# Patient Record
Sex: MALE | Race: OTHER | HISPANIC OR LATINO | Employment: OTHER | URBAN - METROPOLITAN AREA
[De-identification: names, ages, dates, MRNs, and addresses within clinical notes are randomized per-mention and may not be internally consistent; named-entity substitution may affect disease eponyms.]

---

## 2018-06-09 LAB
ABSOL LYMPHOCYTES (HISTORICAL): 3 K/UL (ref 0.5–4)
ALBUMIN SERPL BCP-MCNC: 3.9 G/DL (ref 3–5.2)
ALP SERPL-CCNC: 112 U/L (ref 43–122)
ALT SERPL W P-5'-P-CCNC: 19 U/L (ref 9–52)
ANION GAP SERPL CALCULATED.3IONS-SCNC: 10 MMOL/L (ref 5–14)
AST SERPL W P-5'-P-CCNC: 18 U/L (ref 17–59)
BANDS (HISTORICAL): 1 % (ref 3–11)
BILIRUB SERPL-MCNC: 0.5 MG/DL
BUN SERPL-MCNC: 16 MG/DL (ref 5–25)
CALCIUM SERPL-MCNC: 9.6 MG/DL (ref 8.4–10.2)
CHLORIDE SERPL-SCNC: 101 MEQ/L (ref 97–108)
CHOLEST SERPL-MCNC: 227 MG/DL
CHOLEST/HDLC SERPL: 6.3 {RATIO}
CO2 SERPL-SCNC: 25 MMOL/L (ref 22–30)
COMMENT (HISTORICAL): ABNORMAL
CREATINE, SERUM (HISTORICAL): 0.78 MG/DL (ref 0.7–1.5)
CREATININE, RANDOM URINE (HISTORICAL): 166.9 MG/DL (ref 50–200)
DEPRECATED RDW RBC AUTO: 14.8 %
EGFR (HISTORICAL): >60 ML/MIN/1.73 M2
EOSINOPHIL # BLD AUTO: 0.1 K/UL (ref 0–0.4)
EOSINOPHIL NFR BLD AUTO: 2 % (ref 0–6)
GLUCOSE SERPL-MCNC: 369 MG/DL (ref 70–99)
HCT VFR BLD AUTO: 38.4 % (ref 41–53)
HDLC SERPL-MCNC: 36 MG/DL
HGB BLD-MCNC: 12.2 G/DL (ref 13.5–17.5)
LDL/HDL RATIO (HISTORICAL): 4.5
LDLC SERPL CALC-MCNC: 162 MG/DL
LYMPHOCYTES NFR BLD AUTO: 56 % (ref 25–45)
MCH RBC QN AUTO: 27.5 PG (ref 26–34)
MCHC RBC AUTO-ENTMCNC: 31.8 % (ref 31–36)
MCV RBC AUTO: 86 FL (ref 80–100)
MICROALBUM.,U,RANDOM (HISTORICAL): 4.4 MG/DL
MICROALBUMIN/CREATININE RATIO (HISTORICAL): 26.4
MONOCYTES # BLD AUTO: 0.2 K/UL (ref 0.2–0.9)
MONOCYTES NFR BLD AUTO: 4 % (ref 1–10)
NEUTROPHILS ABS COUNT (HISTORICAL): 2 K/UL (ref 1.8–7.8)
NEUTS SEG NFR BLD AUTO: 37 % (ref 45–65)
PLATELET # BLD AUTO: 266 K/MCL (ref 150–450)
POTASSIUM SERPL-SCNC: 4.5 MEQ/L (ref 3.6–5)
RBC # BLD AUTO: 4.44 M/MCL (ref 4.5–5.9)
RBC MORPHOLOGY (HISTORICAL): ABNORMAL
SODIUM SERPL-SCNC: 137 MEQ/L (ref 137–147)
TOTAL PROTEIN (HISTORICAL): 8.1 G/DL (ref 5.9–8.4)
TRIGL SERPL-MCNC: 145 MG/DL
TSH SERPL DL<=0.05 MIU/L-ACNC: 1.76 UIU/ML (ref 0.47–4.68)
VLDLC SERPL CALC-MCNC: 29 MG/DL (ref 0–40)
WBC # BLD AUTO: 5.3 K/MCL (ref 4.5–11)

## 2018-06-10 LAB
EST. AVERAGE GLUCOSE BLD GHB EST-MCNC: 321 MG/DL
HBA1C MFR BLD HPLC: 12.8 %

## 2018-07-18 ENCOUNTER — TELEPHONE (OUTPATIENT)
Dept: FAMILY MEDICINE CLINIC | Facility: CLINIC | Age: 68
End: 2018-07-18

## 2018-07-18 DIAGNOSIS — E78.49 OTHER HYPERLIPIDEMIA: ICD-10-CM

## 2018-07-18 DIAGNOSIS — E11.69 TYPE 2 DIABETES MELLITUS WITH OTHER SPECIFIED COMPLICATION, WITHOUT LONG-TERM CURRENT USE OF INSULIN (HCC): Primary | ICD-10-CM

## 2018-07-18 RX ORDER — ATORVASTATIN CALCIUM 40 MG/1
40 TABLET, FILM COATED ORAL DAILY
Qty: 30 TABLET | Refills: 0 | Status: SHIPPED | OUTPATIENT
Start: 2018-07-18 | End: 2018-08-15 | Stop reason: SDUPTHER

## 2018-08-15 DIAGNOSIS — E78.49 OTHER HYPERLIPIDEMIA: ICD-10-CM

## 2018-08-15 DIAGNOSIS — K21.9 GASTROESOPHAGEAL REFLUX DISEASE, ESOPHAGITIS PRESENCE NOT SPECIFIED: Primary | ICD-10-CM

## 2018-08-15 DIAGNOSIS — E11.69 TYPE 2 DIABETES MELLITUS WITH OTHER SPECIFIED COMPLICATION, WITHOUT LONG-TERM CURRENT USE OF INSULIN (HCC): ICD-10-CM

## 2018-08-15 NOTE — TELEPHONE ENCOUNTER
Once refilled, please route to clerical pool to schedule patient with new red team PCP   A1C in June was 12 8 and last visit in June as well, pt should f/u Sept

## 2018-08-16 RX ORDER — ATORVASTATIN CALCIUM 40 MG/1
40 TABLET, FILM COATED ORAL DAILY
Qty: 30 TABLET | Refills: 0 | Status: SHIPPED | OUTPATIENT
Start: 2018-08-16 | End: 2018-09-12 | Stop reason: SDUPTHER

## 2018-08-16 RX ORDER — PANTOPRAZOLE SODIUM 20 MG/1
20 TABLET, DELAYED RELEASE ORAL DAILY
Qty: 30 TABLET | Refills: 0 | Status: SHIPPED | OUTPATIENT
Start: 2018-08-16 | End: 2018-09-12 | Stop reason: SDUPTHER

## 2018-08-21 NOTE — TELEPHONE ENCOUNTER
I have tried several time to contact pt and have left voice messages for pt to contact office and as of today no answer  I will be mailing a letter to pt address for pt to contact office   Last voice message was today at 8:30am

## 2018-08-29 DIAGNOSIS — E11.65 TYPE 2 DIABETES MELLITUS WITH HYPERGLYCEMIA, WITH LONG-TERM CURRENT USE OF INSULIN (HCC): Primary | ICD-10-CM

## 2018-08-29 DIAGNOSIS — Z79.4 TYPE 2 DIABETES MELLITUS WITH HYPERGLYCEMIA, WITH LONG-TERM CURRENT USE OF INSULIN (HCC): Primary | ICD-10-CM

## 2018-08-29 NOTE — TELEPHONE ENCOUNTER
Pt has yet to contact to schedule per Dr Britt Green letter  Will provide another refill, please route to clerical pool to try to contact pt to schedule with a new red team PCP in 1-2 months  Patient was seen in June

## 2018-08-31 NOTE — TELEPHONE ENCOUNTER
Please attempt to contact one more time to schedule  Letter has already been sent with no response as of yet   Thanks

## 2018-09-12 DIAGNOSIS — K21.9 GASTROESOPHAGEAL REFLUX DISEASE, ESOPHAGITIS PRESENCE NOT SPECIFIED: ICD-10-CM

## 2018-09-12 DIAGNOSIS — E78.49 OTHER HYPERLIPIDEMIA: ICD-10-CM

## 2018-09-12 DIAGNOSIS — E11.69 TYPE 2 DIABETES MELLITUS WITH OTHER SPECIFIED COMPLICATION, WITHOUT LONG-TERM CURRENT USE OF INSULIN (HCC): ICD-10-CM

## 2018-09-12 NOTE — TELEPHONE ENCOUNTER
Pharmacy is req a refill on this medications  Last visit 6/11/18 With Dr Ralf Trinidad  I send a msg to the front staff to schedule an appt   x

## 2018-09-13 RX ORDER — PANTOPRAZOLE SODIUM 20 MG/1
20 TABLET, DELAYED RELEASE ORAL DAILY
Qty: 30 TABLET | Refills: 0 | Status: SHIPPED | OUTPATIENT
Start: 2018-09-13 | End: 2019-02-04 | Stop reason: SDUPTHER

## 2018-09-13 RX ORDER — ATORVASTATIN CALCIUM 40 MG/1
40 TABLET, FILM COATED ORAL DAILY
Qty: 30 TABLET | Refills: 0 | Status: SHIPPED | OUTPATIENT
Start: 2018-09-13 | End: 2019-02-04 | Stop reason: SDUPTHER

## 2019-01-07 DIAGNOSIS — E11.65 TYPE 2 DIABETES MELLITUS WITH HYPERGLYCEMIA, UNSPECIFIED WHETHER LONG TERM INSULIN USE (HCC): Primary | ICD-10-CM

## 2019-01-07 NOTE — TELEPHONE ENCOUNTER
Was able to get in touch with pt with phone # listed in pt's chart in [place] [id number] which I have listed in [address]  Updated pt's demographics and scheduled f/u appt with Dr Stefan Carpenter on 2/4/19  Pt urged to keep appt in order to receive best cares and continue to receive refills  Pt advised to contact office if cannot keep appt to cancel and/or reschedule  Pt understood and agreed

## 2019-02-04 ENCOUNTER — OFFICE VISIT (OUTPATIENT)
Dept: FAMILY MEDICINE CLINIC | Facility: CLINIC | Age: 69
End: 2019-02-04

## 2019-02-04 VITALS
DIASTOLIC BLOOD PRESSURE: 78 MMHG | SYSTOLIC BLOOD PRESSURE: 160 MMHG | OXYGEN SATURATION: 97 % | RESPIRATION RATE: 16 BRPM | TEMPERATURE: 98.4 F | WEIGHT: 193 LBS | HEART RATE: 73 BPM

## 2019-02-04 DIAGNOSIS — K21.9 GASTROESOPHAGEAL REFLUX DISEASE, ESOPHAGITIS PRESENCE NOT SPECIFIED: ICD-10-CM

## 2019-02-04 DIAGNOSIS — Z12.11 SCREENING FOR COLON CANCER: ICD-10-CM

## 2019-02-04 DIAGNOSIS — R10.31 RIGHT LOWER QUADRANT ABDOMINAL PAIN: ICD-10-CM

## 2019-02-04 DIAGNOSIS — M54.50 CHRONIC MIDLINE LOW BACK PAIN WITHOUT SCIATICA: ICD-10-CM

## 2019-02-04 DIAGNOSIS — I10 ESSENTIAL HYPERTENSION: ICD-10-CM

## 2019-02-04 DIAGNOSIS — Z23 NEED FOR VACCINATION: ICD-10-CM

## 2019-02-04 DIAGNOSIS — G89.29 CHRONIC MIDLINE LOW BACK PAIN WITHOUT SCIATICA: ICD-10-CM

## 2019-02-04 DIAGNOSIS — E78.49 OTHER HYPERLIPIDEMIA: ICD-10-CM

## 2019-02-04 DIAGNOSIS — Z79.4 TYPE 2 DIABETES MELLITUS WITH HYPERGLYCEMIA, WITH LONG-TERM CURRENT USE OF INSULIN (HCC): Primary | ICD-10-CM

## 2019-02-04 DIAGNOSIS — E11.65 TYPE 2 DIABETES MELLITUS WITH HYPERGLYCEMIA, WITH LONG-TERM CURRENT USE OF INSULIN (HCC): Primary | ICD-10-CM

## 2019-02-04 PROCEDURE — 90662 IIV NO PRSV INCREASED AG IM: CPT | Performed by: FAMILY MEDICINE

## 2019-02-04 PROCEDURE — G0008 ADMIN INFLUENZA VIRUS VAC: HCPCS | Performed by: FAMILY MEDICINE

## 2019-02-04 PROCEDURE — 3725F SCREEN DEPRESSION PERFORMED: CPT | Performed by: FAMILY MEDICINE

## 2019-02-04 PROCEDURE — 1101F PT FALLS ASSESS-DOCD LE1/YR: CPT | Performed by: FAMILY MEDICINE

## 2019-02-04 PROCEDURE — 99214 OFFICE O/P EST MOD 30 MIN: CPT | Performed by: FAMILY MEDICINE

## 2019-02-04 RX ORDER — PANTOPRAZOLE SODIUM 20 MG/1
20 TABLET, DELAYED RELEASE ORAL DAILY
Qty: 30 TABLET | Refills: 0 | Status: SHIPPED | OUTPATIENT
Start: 2019-02-04 | End: 2019-03-15 | Stop reason: SDUPTHER

## 2019-02-04 RX ORDER — HYDROCHLOROTHIAZIDE 25 MG/1
25 TABLET ORAL DAILY
Qty: 90 TABLET | Refills: 0 | Status: SHIPPED | OUTPATIENT
Start: 2019-02-04 | End: 2019-05-08 | Stop reason: SDUPTHER

## 2019-02-04 RX ORDER — AMLODIPINE BESYLATE 10 MG/1
10 TABLET ORAL DAILY
Qty: 90 TABLET | Refills: 0 | Status: SHIPPED | OUTPATIENT
Start: 2019-02-04 | End: 2019-05-08 | Stop reason: SDUPTHER

## 2019-02-04 RX ORDER — ATORVASTATIN CALCIUM 40 MG/1
40 TABLET, FILM COATED ORAL DAILY
Qty: 30 TABLET | Refills: 0 | Status: SHIPPED | OUTPATIENT
Start: 2019-02-04 | End: 2019-03-15 | Stop reason: SDUPTHER

## 2019-02-04 NOTE — PROGRESS NOTES
Assessment/Plan:    Gastroesophageal reflux disease  Counseled patient on dietary modifications and specific diet recommendations that will help to best control GERD symptoms  These practices would include limiting or eliminating caffeinated foods and beverages including chocolate, coffee and soda  Also limiting the intake of spicy foods or anything else that exacerbate symptoms  Will continue patient on PPI  Advised nothing by mouth at least 2-3 hours prior to lying down for bed  Advise placing the head of the bed on riser is to assist in gravity keeping stomach acid down      Type 2 diabetes mellitus with hyperglycemia, with long-term current use of insulin ([facility])  Lab Results   Component Value Date    HGBA1C 12 8 (H) 06/09/2018       No results for input(s): POCGLU in the last 72 hours      Blood Sugar Average: Last 72 hrs:     Patient is uncontrolled  Counseled extensively on diet and exercise  Advised and educated on home glucose testing and logs  Continue current p o  regimen of metformin 1000mg BID   Continue current insulin regimen of lantus 65U HS  Follows with Ophthalmology and Podiatry      Essential hypertension  Patient shows reasonable control on current regimen  Counseled on diet and exercise, particularly consumption of low sodium and processed foods  Educated on home blood pressure logs  Continue current medication regimen with HCTZ and amlodipine       Other hyperlipidemia  Patient counseled on diet and exercise  Advised to continue statin medication  Lipid profile ordered      Right lower quadrant abdominal pain  Patient reports that he has had this abdominal pain for years  Previous CT scans abdomen and pelvis WNL per patient  Pain is mild, nonspecific, and doesn't change in physical exam    Chronic midline low back pain without sciatica  Pain for many years  Patient reports having been through PT in the past and had more pain afterwards  Reports being seen by specialist in the past, but doesn't remember how long ago or who it was  Patient has empty Tramadol bottle with him today and reports last use was ~1 year ago because he hasn't had any, I refused to refill this medication  Will refer to pain/spine for further treatment/options       Diagnoses and all orders for this visit:    Type 2 diabetes mellitus with hyperglycemia, with long-term current use of insulin (HCC)  -     metFORMIN (GLUCOPHAGE) 1000 MG tablet; Take 1 tablet (1,000 mg total) by mouth 2 (two) times a day with meals  -     glucose blood (FREESTYLE LITE) test strip; 1 each by Other route 3 (three) times a day  -     insulin glargine (LANTUS SOLOSTAR) 100 units/mL injection pen; Inject 65 units in morning and 55 units in evening  -     Comprehensive metabolic panel; Future  -     Microalbumin / creatinine urine ratio  -     HEMOGLOBIN A1C W/ EAG ESTIMATION; Future  -     CBC and Platelet; Future    Need for vaccination  -     PREFERRED: influenza vaccine, [id number], high-dose, PF 0 5 mL, for patients 65 yr+ (FLUZONE HIGH-DOSE)    Other hyperlipidemia  -     atorvastatin (LIPITOR) 40 mg tablet; Take 1 tablet (40 mg total) by mouth daily  -     Lipid panel; Future    Gastroesophageal reflux disease, esophagitis presence not specified  -     pantoprazole (PROTONIX) 20 mg tablet; Take 1 tablet (20 mg total) by mouth daily    Essential hypertension  -     amLODIPine (NORVASC) 10 mg tablet; Take 1 tablet (10 mg total) by mouth daily  -     hydrochlorothiazide (HYDRODIURIL) 25 mg tablet; Take 1 tablet (25 mg total) by mouth daily    Right lower quadrant abdominal pain    Chronic midline low back pain without sciatica  -     Ambulatory referral to Pain Management; Future    Screening for colon cancer  -     Ambulatory referral to Gastroenterology; Future          Subjective:      Patient ID: Zuhair Sullivan is a 77 y o  male  This is a 78 yo  M who comes into the clinic today for follow up of chronic conditions   [employer] was originally used for translation, but patient speaks adequate english for the visit  He was a previous patient of Dr David Rocha  He reports being in [place] for ~9 months since his last visit and did not see a physician at all during that time  Despite this, he reports adequate diabetic control, at least by fasting morning glucose  He has no complaints today other than his chronic pain in his lower pain and mild RLQ abdominal, both of which have been evaluated on multiple occasions in the past  He specifically denies chest pain or shortness of breath  The following portions of the patient's history were reviewed and updated as appropriate: allergies, current medications, past family history, past medical history, past social history, past surgical history and problem list     Review of Systems   Constitutional: Negative for activity change, chills, fatigue and fever  HENT: Negative for congestion, sinus pain, sinus pressure and sore throat  Respiratory: Negative for cough, chest tightness, shortness of breath and wheezing  Cardiovascular: Negative for chest pain and palpitations  Gastrointestinal: Positive for abdominal pain  Negative for constipation, diarrhea, nausea and vomiting  Genitourinary: Negative for difficulty urinating, dysuria, frequency and hematuria  Musculoskeletal: Positive for back pain  Negative for arthralgias and neck pain  Skin: Negative for rash  Allergic/Immunologic: Negative for environmental allergies  Neurological: Negative for light-headedness and headaches  Psychiatric/Behavioral: Negative for confusion and hallucinations  Objective:      /78 (BP Location: Left arm, Patient Position: Sitting, Cuff Size: Adult)   Pulse 73   Temp 98 4 °F (36 9 °C) (Temporal)   Resp 16   Wt 87 5 kg (193 lb)   SpO2 97%          Physical Exam   Constitutional: He is oriented to person, place, and time  He appears well-developed and well-nourished  No distress     HENT:   Head: Normocephalic and atraumatic  Cardiovascular: Normal rate, regular rhythm and normal heart sounds  Pulses are no weak pulses  No murmur heard  Pulses:       Dorsalis pedis pulses are 1+ on the right side, and 2+ on the left side  Pulmonary/Chest: Effort normal and breath sounds normal  He has no wheezes  Abdominal: Soft  Bowel sounds are normal  There is tenderness (RLQ)  There is no guarding  Musculoskeletal: Normal range of motion  He exhibits no edema  Feet:   Right Foot:   Skin Integrity: Negative for ulcer, skin breakdown, erythema, warmth, callus or dry skin  Left Foot:   Skin Integrity: Negative for ulcer, skin breakdown, erythema, warmth, callus or dry skin  Neurological: He is alert and oriented to person, place, and time  Skin: Skin is warm and dry  No rash noted  Psychiatric: He has a normal mood and affect  His behavior is normal    Nursing note and vitals reviewed  Patient's shoes and socks removed  Right Foot/Ankle   Right Foot Inspection  Skin Exam: skin normal and skin intact no dry skin, no warmth, no callus, no erythema, no maceration, no abnormal color, no pre-ulcer, no ulcer and no callus                          Toe Exam: ROM and strength within normal limits  Sensory   Vibration: intact    Monofilament testing: intact  Vascular  Capillary refills: < 3 seconds  The right DP pulse is 1+  Right Toe  - Comprehensive Exam  Ecchymosis: none  Swelling: none   Tenderness: none         Left Foot/Ankle  Left Foot Inspection  Skin Exam: skin normal and skin intactno dry skin, no warmth, no erythema, no maceration, normal color, no pre-ulcer, no ulcer and no callus                         Toe Exam: ROM and strength within normal limits                   Sensory   Vibration: intact    Monofilament: intact  Vascular  Capillary refills: < 3 seconds  The left DP pulse is 2+     Left Toe  - Comprehensive Exam  Ecchymosis: none  Swelling: none   Tenderness: none       Assign Risk Category:  No deformity present; No loss of protective sensation;  No weak pulses       Risk: 0

## 2019-02-04 NOTE — ASSESSMENT & PLAN NOTE
Lab Results   Component Value Date    HGBA1C 12 8 (H) 06/09/2018       No results for input(s): POCGLU in the last 72 hours      Blood Sugar Average: Last 72 hrs:     Patient is uncontrolled  Counseled extensively on diet and exercise  Advised and educated on home glucose testing and logs  Continue current p o  regimen of metformin 1000mg BID   Continue current insulin regimen of lantus 65U HS  Follows with Ophthalmology and Podiatry

## 2019-02-04 NOTE — ASSESSMENT & PLAN NOTE
Pain for many years  Patient reports having been through PT in the past and had more pain afterwards  Reports being seen by specialist in the past, but doesn't remember how long ago or who it was  Patient has empty Tramadol bottle with him today and reports last use was ~1 year ago because he hasn't had any, I refused to refill this medication  Will refer to pain/spine for further treatment/options

## 2019-02-04 NOTE — ASSESSMENT & PLAN NOTE
Patient counseled on diet and exercise  Advised to continue statin medication  Lipid profile ordered

## 2019-02-04 NOTE — ASSESSMENT & PLAN NOTE
Patient reports that he has had this abdominal pain for years  Previous CT scans abdomen and pelvis WNL per patient  Pain is mild, nonspecific, and doesn't change in physical exam

## 2019-02-04 NOTE — ASSESSMENT & PLAN NOTE
Patient shows reasonable control on current regimen  Counseled on diet and exercise, particularly consumption of low sodium and processed foods  Educated on home blood pressure logs  Continue current medication regimen with HCTZ and amlodipine

## 2019-02-12 ENCOUNTER — TELEPHONE (OUTPATIENT)
Dept: FAMILY MEDICINE CLINIC | Facility: CLINIC | Age: 69
End: 2019-02-12

## 2019-02-12 NOTE — TELEPHONE ENCOUNTER
Pharmacy is req refill on this medication Januvia 100 mg  I dont see this medication in the chart   thx

## 2019-02-21 NOTE — TELEPHONE ENCOUNTER
Patient is already insulin dependent and on metformin  I don't see a big role for [place] [facility] [facility] here  Perhaps we can start patient on victoza at some point in the future  He also hasn't gotten any of the labs I ordered

## 2019-02-27 DIAGNOSIS — E11.65 TYPE 2 DIABETES MELLITUS WITH HYPERGLYCEMIA, WITH LONG-TERM CURRENT USE OF INSULIN (HCC): ICD-10-CM

## 2019-02-27 DIAGNOSIS — Z79.4 TYPE 2 DIABETES MELLITUS WITH HYPERGLYCEMIA, WITH LONG-TERM CURRENT USE OF INSULIN (HCC): ICD-10-CM

## 2019-02-27 NOTE — TELEPHONE ENCOUNTER
Pt called to [facility] med  Also requesting alcohol swabs

## 2019-03-01 ENCOUNTER — TELEPHONE (OUTPATIENT)
Dept: FAMILY MEDICINE CLINIC | Facility: CLINIC | Age: 69
End: 2019-03-01

## 2019-03-15 DIAGNOSIS — E11.65 TYPE 2 DIABETES MELLITUS WITH HYPERGLYCEMIA, WITH LONG-TERM CURRENT USE OF INSULIN (HCC): ICD-10-CM

## 2019-03-15 DIAGNOSIS — E78.49 OTHER HYPERLIPIDEMIA: ICD-10-CM

## 2019-03-15 DIAGNOSIS — K21.9 GASTROESOPHAGEAL REFLUX DISEASE, ESOPHAGITIS PRESENCE NOT SPECIFIED: ICD-10-CM

## 2019-03-15 DIAGNOSIS — Z79.4 TYPE 2 DIABETES MELLITUS WITH HYPERGLYCEMIA, WITH LONG-TERM CURRENT USE OF INSULIN (HCC): ICD-10-CM

## 2019-03-18 RX ORDER — ATORVASTATIN CALCIUM 40 MG/1
40 TABLET, FILM COATED ORAL DAILY
Qty: 30 TABLET | Refills: 1 | Status: SHIPPED | OUTPATIENT
Start: 2019-03-18 | End: 2019-05-08 | Stop reason: SDUPTHER

## 2019-03-18 RX ORDER — PANTOPRAZOLE SODIUM 20 MG/1
20 TABLET, DELAYED RELEASE ORAL DAILY
Qty: 30 TABLET | Refills: 1 | Status: SHIPPED | OUTPATIENT
Start: 2019-03-18 | End: 2019-05-08 | Stop reason: SDUPTHER

## 2019-04-25 ENCOUNTER — TELEPHONE (OUTPATIENT)
Dept: FAMILY MEDICINE CLINIC | Facility: CLINIC | Age: 69
End: 2019-04-25

## 2019-05-08 DIAGNOSIS — E78.49 OTHER HYPERLIPIDEMIA: ICD-10-CM

## 2019-05-08 DIAGNOSIS — Z79.4 TYPE 2 DIABETES MELLITUS WITH HYPERGLYCEMIA, WITH LONG-TERM CURRENT USE OF INSULIN (HCC): ICD-10-CM

## 2019-05-08 DIAGNOSIS — K21.9 GASTROESOPHAGEAL REFLUX DISEASE, ESOPHAGITIS PRESENCE NOT SPECIFIED: ICD-10-CM

## 2019-05-08 DIAGNOSIS — I10 ESSENTIAL HYPERTENSION: ICD-10-CM

## 2019-05-08 DIAGNOSIS — E11.65 TYPE 2 DIABETES MELLITUS WITH HYPERGLYCEMIA, WITH LONG-TERM CURRENT USE OF INSULIN (HCC): ICD-10-CM

## 2019-05-09 DIAGNOSIS — Z79.4 TYPE 2 DIABETES MELLITUS WITH HYPERGLYCEMIA, WITH LONG-TERM CURRENT USE OF INSULIN (HCC): ICD-10-CM

## 2019-05-09 DIAGNOSIS — R06.02 SHORTNESS OF BREATH: Primary | ICD-10-CM

## 2019-05-09 DIAGNOSIS — E11.65 TYPE 2 DIABETES MELLITUS WITH HYPERGLYCEMIA, WITH LONG-TERM CURRENT USE OF INSULIN (HCC): ICD-10-CM

## 2019-05-09 RX ORDER — PEN NEEDLE, DIABETIC 31 GX5/16"
NEEDLE, DISPOSABLE MISCELLANEOUS DAILY
Qty: 100 EACH | Refills: 3 | Status: SHIPPED | OUTPATIENT
Start: 2019-05-09 | End: 2019-12-31 | Stop reason: SDUPTHER

## 2019-05-13 RX ORDER — AMLODIPINE BESYLATE 10 MG/1
10 TABLET ORAL DAILY
Qty: 30 TABLET | Refills: 0 | Status: SHIPPED | OUTPATIENT
Start: 2019-05-13 | End: 2019-07-01 | Stop reason: SDUPTHER

## 2019-05-13 RX ORDER — PANTOPRAZOLE SODIUM 20 MG/1
20 TABLET, DELAYED RELEASE ORAL DAILY
Qty: 30 TABLET | Refills: 0 | Status: SHIPPED | OUTPATIENT
Start: 2019-05-13 | End: 2019-07-01 | Stop reason: SDUPTHER

## 2019-05-13 RX ORDER — HYDROCHLOROTHIAZIDE 25 MG/1
25 TABLET ORAL DAILY
Qty: 30 TABLET | Refills: 0 | Status: SHIPPED | OUTPATIENT
Start: 2019-05-13 | End: 2019-07-01 | Stop reason: SDUPTHER

## 2019-05-13 RX ORDER — ATORVASTATIN CALCIUM 40 MG/1
40 TABLET, FILM COATED ORAL DAILY
Qty: 30 TABLET | Refills: 0 | Status: SHIPPED | OUTPATIENT
Start: 2019-05-13 | End: 2019-07-01 | Stop reason: SDUPTHER

## 2019-06-12 DIAGNOSIS — Z79.4 TYPE 2 DIABETES MELLITUS WITH HYPERGLYCEMIA, WITH LONG-TERM CURRENT USE OF INSULIN (HCC): ICD-10-CM

## 2019-06-12 DIAGNOSIS — I10 ESSENTIAL HYPERTENSION: ICD-10-CM

## 2019-06-12 DIAGNOSIS — E11.65 TYPE 2 DIABETES MELLITUS WITH HYPERGLYCEMIA, WITH LONG-TERM CURRENT USE OF INSULIN (HCC): ICD-10-CM

## 2019-06-12 DIAGNOSIS — E78.49 OTHER HYPERLIPIDEMIA: ICD-10-CM

## 2019-06-12 DIAGNOSIS — K21.9 GASTROESOPHAGEAL REFLUX DISEASE, ESOPHAGITIS PRESENCE NOT SPECIFIED: ICD-10-CM

## 2019-06-12 RX ORDER — HYDROCHLOROTHIAZIDE 25 MG/1
25 TABLET ORAL DAILY
Qty: 30 TABLET | Refills: 0 | OUTPATIENT
Start: 2019-06-12

## 2019-06-12 RX ORDER — ATORVASTATIN CALCIUM 40 MG/1
40 TABLET, FILM COATED ORAL DAILY
Qty: 30 TABLET | Refills: 0 | OUTPATIENT
Start: 2019-06-12

## 2019-06-12 RX ORDER — AMLODIPINE BESYLATE 10 MG/1
10 TABLET ORAL DAILY
Qty: 30 TABLET | Refills: 0 | OUTPATIENT
Start: 2019-06-12

## 2019-06-12 RX ORDER — PANTOPRAZOLE SODIUM 20 MG/1
20 TABLET, DELAYED RELEASE ORAL DAILY
Qty: 30 TABLET | Refills: 0 | OUTPATIENT
Start: 2019-06-12

## 2019-07-01 DIAGNOSIS — E78.49 OTHER HYPERLIPIDEMIA: ICD-10-CM

## 2019-07-01 DIAGNOSIS — I10 ESSENTIAL HYPERTENSION: ICD-10-CM

## 2019-07-01 DIAGNOSIS — K21.9 GASTROESOPHAGEAL REFLUX DISEASE, ESOPHAGITIS PRESENCE NOT SPECIFIED: ICD-10-CM

## 2019-07-01 DIAGNOSIS — Z79.4 TYPE 2 DIABETES MELLITUS WITH HYPERGLYCEMIA, WITH LONG-TERM CURRENT USE OF INSULIN (HCC): ICD-10-CM

## 2019-07-01 DIAGNOSIS — E11.65 TYPE 2 DIABETES MELLITUS WITH HYPERGLYCEMIA, WITH LONG-TERM CURRENT USE OF INSULIN (HCC): ICD-10-CM

## 2019-07-01 RX ORDER — HYDROCHLOROTHIAZIDE 25 MG/1
25 TABLET ORAL DAILY
Qty: 30 TABLET | Refills: 0 | Status: SHIPPED | OUTPATIENT
Start: 2019-07-01 | End: 2019-07-05 | Stop reason: ALTCHOICE

## 2019-07-01 RX ORDER — PANTOPRAZOLE SODIUM 20 MG/1
20 TABLET, DELAYED RELEASE ORAL DAILY
Qty: 30 TABLET | Refills: 0 | Status: SHIPPED | OUTPATIENT
Start: 2019-07-01 | End: 2019-07-05 | Stop reason: ALTCHOICE

## 2019-07-01 RX ORDER — ATORVASTATIN CALCIUM 40 MG/1
40 TABLET, FILM COATED ORAL DAILY
Qty: 30 TABLET | Refills: 0 | Status: SHIPPED | OUTPATIENT
Start: 2019-07-01 | End: 2019-07-05 | Stop reason: SDUPTHER

## 2019-07-01 RX ORDER — AMLODIPINE BESYLATE 10 MG/1
10 TABLET ORAL DAILY
Qty: 30 TABLET | Refills: 0 | Status: SHIPPED | OUTPATIENT
Start: 2019-07-01 | End: 2019-07-05 | Stop reason: SDUPTHER

## 2019-07-03 RX ORDER — ASPIRIN 81 MG/1
81 TABLET ORAL DAILY
COMMUNITY
End: 2019-07-05 | Stop reason: SDUPTHER

## 2019-07-03 NOTE — PROGRESS NOTES
Assessment/Plan:    Gastroesophageal reflux disease  Control  Continue with Protonix 40 mg daily    Type 2 diabetes mellitus with hyperglycemia, with long-term current use of insulin ([facility])  Lab Results   Component Value Date    HGBA1C 12 8 (H) 06/09/2018     Hemoglobin A1c pending  Seems to be very poorly controlled with very poor inside from the patient  Decrease Lantus to 50 units b i d  Will start the patient on Jardiance 10 mg daily  Continue with metformin 1000 mg b i d  Will need extensive work to better educated the patient diabetes will refer the patient to nutritional therapy once there is a better grasp on his diabetes control as well as his A1c      Essential hypertension  Not control, blood pressure 158/72  Given the patient's current risk factors, blood pressure goal of less than 140/90  Again patient has very poor inside on his antihypertensives  For now will continue with amlodipine 10 mg daily as well as lisinopril 20 mg daily      Return in about 3 months (around 10/5/2019) for DM -revaluate   Diagnoses and all orders for this visit:    Essential hypertension  -     Microalbumin / creatinine urine ratio; Future  -     amLODIPine (NORVASC) 10 mg tablet; Take 1 tablet (10 mg total) by mouth daily  -     lisinopril (ZESTRIL) 20 mg tablet; Take 1 tablet (20 mg total) by mouth daily  -     aspirin (ECOTRIN LOW STRENGTH) 81 mg EC tablet; Take 1 tablet (81 mg total) by mouth daily  -     atorvastatin (LIPITOR) 40 mg tablet; Take 1 tablet (40 mg total) by mouth daily    Type 2 diabetes mellitus with hyperglycemia, with long-term current use of insulin ([facility])  -     Microalbumin / creatinine urine ratio; Future  -     metFORMIN (GLUCOPHAGE) 1000 MG tablet; Take 1 tablet (1,000 mg total) by mouth 2 (two) times a day with meals  -     aspirin (ECOTRIN LOW STRENGTH) 81 mg EC tablet; Take 1 tablet (81 mg total) by mouth daily  -     atorvastatin (LIPITOR) 40 mg tablet;  Take 1 tablet (40 mg total) by mouth daily  -     insulin glargine (LANTUS SOLOSTAR) 100 units/mL injection pen; Inject 50 Units under the skin every 12 (twelve) hours Inject 65 units in morning and 55 units in evening  -     Empagliflozin (JARDIANCE) 10 MG TABS; Take 1 tablet (10 mg total) by mouth every morning    Gastroesophageal reflux disease, esophagitis presence not specified  -     pantoprazole (PROTONIX) 40 mg tablet; Take 1 tablet (40 mg total) by mouth daily    Other orders  -     Discontinue: aspirin (ECOTRIN LOW STRENGTH) 81 mg EC tablet; Take 81 mg by mouth daily  -     Cancel: Ambulatory referral to Gastroenterology; Future  -     Discontinue: losartan (COZAAR) 100 MG tablet; Take 100 mg by mouth daily        Subjective:     Angel Pisano is a 77 y o  male who  has no past medical history on file  who presented to the office today for diabetes mellitus re-evaluation  HPI  Patient mentioned that:  BG log: FS BG is 110-160, measuring 2 times daily but intermittently, low BG about 0 times in the past week and is aware of it   Last A1C was in 12 8  Insulin usage: Lantus 60u and 55u  Vision: 07/2/2019   Feet neuropathy: yes  Hospitalization: none     CAD or CVA: none     Patient did not bring any of his medications with him  He has very poor inside on his medical conditions as well as his current medications  Did as the patient did go home and bring all his medications  Patient brought in only 4 bottles of medications that were all empty which were Lipitor 40 mg daily, lisinopril 40 mg daily, hydrochlorothiazide 40 mg daily, and tramadol 50 mg that was last filled in 2015  Patient insisted on having the tramadol refill although did explain to the patient multiple times that has been 4 years since his last reassessment of such problem  Call the patient again over the phone to speak to him in details about all the medications he is on    He continues to express poor understanding in what medications he is on an insisted that all of his medications are in the electronic records  Explained to the patient in details using an  over the phone about his new regimen  Call the pharmacy and ask them to clear out all the medications that were provided by the patient from before and to only provide the patient with the newer medications that were sent today  The following portions of the patient's history were reviewed and updated as appropriate: allergies, current medications, past family history, past medical history, past social history, past surgical history and problem list       Current Outpatient Medications on File Prior to Visit   Medication Sig Dispense Refill    Albuterol Sulfate 108 (90 Base) MCG/ACT AEPB Inhale 1 puff every 6 (six) hours as needed (shortness of breath) 1 each 3    Alcohol Swabs (ALCOHOL PREP) PADS by Does not apply route daily 100 each 3    glucose blood (FREESTYLE LITE) test strip 1 each by Other route 3 (three) times a day 100 each 2    [DISCONTINUED] amLODIPine (NORVASC) 10 mg tablet Take 1 tablet (10 mg total) by mouth daily 30 tablet 0    [DISCONTINUED] aspirin (ECOTRIN LOW STRENGTH) 81 mg EC tablet Take 81 mg by mouth daily      [DISCONTINUED] atorvastatin (LIPITOR) 40 mg tablet Take 1 tablet (40 mg total) by mouth daily 30 tablet 0    [DISCONTINUED] hydrochlorothiazide (HYDRODIURIL) 25 mg tablet Take 1 tablet (25 mg total) by mouth daily 30 tablet 0    [DISCONTINUED] insulin glargine (LANTUS SOLOSTAR) 100 units/mL injection pen Inject 65 units in morning and 55 units in evening 5 pen 3    [DISCONTINUED] losartan (COZAAR) 100 MG tablet Take 100 mg by mouth daily      [DISCONTINUED] metFORMIN (GLUCOPHAGE) 1000 MG tablet Take 1 tablet (1,000 mg total) by mouth 2 (two) times a day with meals 60 tablet 0    [DISCONTINUED] pantoprazole (PROTONIX) 20 mg tablet Take 1 tablet (20 mg total) by mouth daily 30 tablet 0     No current facility-administered medications on file prior to visit  Review of Systems   Constitutional: Negative for fatigue and fever  Respiratory: Negative for chest tightness  Cardiovascular: Negative for chest pain  Gastrointestinal: Negative for abdominal pain  Skin: Negative for rash  Neurological: Negative for weakness  Hematological: Negative for adenopathy  Objective:    /72 (BP Location: Left arm, Patient Position: Sitting, Cuff Size: Adult) Comment: pt didnt took the meds today  Pulse 68   Temp 97 5 °F (36 4 °C) (Temporal)   Resp 16   Wt 84 8 kg (187 lb)   SpO2 98%       Physical Exam   Constitutional: He is oriented to person, place, and time  He appears well-developed and well-nourished  No distress  HENT:   Head: Normocephalic and atraumatic  Nose: Nose normal    Eyes: Pupils are equal, round, and reactive to light  Conjunctivae are normal    Neck: Normal range of motion  Neck supple  Cardiovascular: Normal rate, regular rhythm and normal heart sounds  Exam reveals no gallop and no friction rub  No murmur heard  Pulmonary/Chest: Effort normal and breath sounds normal  No respiratory distress  He has no wheezes  He has no rales  Abdominal: Soft  Bowel sounds are normal  He exhibits no distension  There is no tenderness  Musculoskeletal: Normal range of motion  He exhibits no edema  Neurological: He is alert and oriented to person, place, and time  Skin: Skin is warm and dry  No rash noted  He is not diaphoretic  No erythema         Pearson Collet, MD  07/05/19  11:59 AM

## 2019-07-05 ENCOUNTER — APPOINTMENT (OUTPATIENT)
Dept: LAB | Facility: CLINIC | Age: 69
End: 2019-07-05
Payer: COMMERCIAL

## 2019-07-05 ENCOUNTER — OFFICE VISIT (OUTPATIENT)
Dept: FAMILY MEDICINE CLINIC | Facility: CLINIC | Age: 69
End: 2019-07-05

## 2019-07-05 ENCOUNTER — TRANSCRIBE ORDERS (OUTPATIENT)
Dept: LAB | Facility: CLINIC | Age: 69
End: 2019-07-05

## 2019-07-05 VITALS
OXYGEN SATURATION: 98 % | DIASTOLIC BLOOD PRESSURE: 72 MMHG | TEMPERATURE: 97.5 F | RESPIRATION RATE: 16 BRPM | HEART RATE: 68 BPM | SYSTOLIC BLOOD PRESSURE: 158 MMHG | WEIGHT: 187 LBS

## 2019-07-05 DIAGNOSIS — K21.9 GASTROESOPHAGEAL REFLUX DISEASE, ESOPHAGITIS PRESENCE NOT SPECIFIED: ICD-10-CM

## 2019-07-05 DIAGNOSIS — E11.65 TYPE 2 DIABETES MELLITUS WITH HYPERGLYCEMIA, WITH LONG-TERM CURRENT USE OF INSULIN (HCC): ICD-10-CM

## 2019-07-05 DIAGNOSIS — Z79.4 TYPE 2 DIABETES MELLITUS WITH HYPERGLYCEMIA, WITH LONG-TERM CURRENT USE OF INSULIN (HCC): ICD-10-CM

## 2019-07-05 DIAGNOSIS — I10 ESSENTIAL HYPERTENSION: Primary | ICD-10-CM

## 2019-07-05 DIAGNOSIS — E78.49 OTHER HYPERLIPIDEMIA: ICD-10-CM

## 2019-07-05 DIAGNOSIS — I10 ESSENTIAL HYPERTENSION: ICD-10-CM

## 2019-07-05 LAB
ALBUMIN SERPL BCP-MCNC: 3.3 G/DL (ref 3.5–5)
ALP SERPL-CCNC: 93 U/L (ref 46–116)
ALT SERPL W P-5'-P-CCNC: 21 U/L (ref 12–78)
ANION GAP SERPL CALCULATED.3IONS-SCNC: 5 MMOL/L (ref 4–13)
AST SERPL W P-5'-P-CCNC: 18 U/L (ref 5–45)
BILIRUB SERPL-MCNC: 0.49 MG/DL (ref 0.2–1)
BUN SERPL-MCNC: 12 MG/DL (ref 5–25)
CALCIUM SERPL-MCNC: 9.2 MG/DL (ref 8.3–10.1)
CHLORIDE SERPL-SCNC: 107 MMOL/L (ref 100–108)
CHOLEST SERPL-MCNC: 182 MG/DL (ref 50–200)
CO2 SERPL-SCNC: 30 MMOL/L (ref 21–32)
CREAT SERPL-MCNC: 0.86 MG/DL (ref 0.6–1.3)
CREAT UR-MCNC: 86.5 MG/DL
ERYTHROCYTE [DISTWIDTH] IN BLOOD BY AUTOMATED COUNT: 15.3 % (ref 11.6–15.1)
EST. AVERAGE GLUCOSE BLD GHB EST-MCNC: 249 MG/DL
GFR SERPL CREATININE-BSD FRML MDRD: 88 ML/MIN/1.73SQ M
GLUCOSE P FAST SERPL-MCNC: 147 MG/DL (ref 65–99)
HBA1C MFR BLD: 10.3 % (ref 4.2–6.3)
HCT VFR BLD AUTO: 40.3 % (ref 36.5–49.3)
HDLC SERPL-MCNC: 41 MG/DL (ref 40–60)
HGB BLD-MCNC: 12.1 G/DL (ref 12–17)
LDLC SERPL CALC-MCNC: 126 MG/DL (ref 0–100)
MCH RBC QN AUTO: 26.7 PG (ref 26.8–34.3)
MCHC RBC AUTO-ENTMCNC: 30 G/DL (ref 31.4–37.4)
MCV RBC AUTO: 89 FL (ref 82–98)
MICROALBUMIN UR-MCNC: 26 MG/L (ref 0–20)
MICROALBUMIN/CREAT 24H UR: 30 MG/G CREATININE (ref 0–30)
NONHDLC SERPL-MCNC: 141 MG/DL
PLATELET # BLD AUTO: 281 THOUSANDS/UL (ref 149–390)
PMV BLD AUTO: 11.4 FL (ref 8.9–12.7)
POTASSIUM SERPL-SCNC: 3.7 MMOL/L (ref 3.5–5.3)
PROT SERPL-MCNC: 8.1 G/DL (ref 6.4–8.2)
RBC # BLD AUTO: 4.54 MILLION/UL (ref 3.88–5.62)
SODIUM SERPL-SCNC: 142 MMOL/L (ref 136–145)
TRIGL SERPL-MCNC: 77 MG/DL
WBC # BLD AUTO: 5.43 THOUSAND/UL (ref 4.31–10.16)

## 2019-07-05 PROCEDURE — 80053 COMPREHEN METABOLIC PANEL: CPT

## 2019-07-05 PROCEDURE — 85027 COMPLETE CBC AUTOMATED: CPT

## 2019-07-05 PROCEDURE — 82043 UR ALBUMIN QUANTITATIVE: CPT | Performed by: FAMILY MEDICINE

## 2019-07-05 PROCEDURE — 83036 HEMOGLOBIN GLYCOSYLATED A1C: CPT

## 2019-07-05 PROCEDURE — 99213 OFFICE O/P EST LOW 20 MIN: CPT | Performed by: FAMILY MEDICINE

## 2019-07-05 PROCEDURE — 3060F POS MICROALBUMINURIA REV: CPT | Performed by: INTERNAL MEDICINE

## 2019-07-05 PROCEDURE — 36415 COLL VENOUS BLD VENIPUNCTURE: CPT

## 2019-07-05 PROCEDURE — 82570 ASSAY OF URINE CREATININE: CPT | Performed by: FAMILY MEDICINE

## 2019-07-05 PROCEDURE — 80061 LIPID PANEL: CPT

## 2019-07-05 RX ORDER — ASPIRIN 81 MG/1
81 TABLET ORAL DAILY
Qty: 30 TABLET | Refills: 0 | Status: SHIPPED | OUTPATIENT
Start: 2019-07-05 | End: 2019-08-06 | Stop reason: SDUPTHER

## 2019-07-05 RX ORDER — AMLODIPINE BESYLATE 10 MG/1
10 TABLET ORAL DAILY
Qty: 30 TABLET | Refills: 0 | Status: SHIPPED | OUTPATIENT
Start: 2019-07-05 | End: 2019-08-26 | Stop reason: SDUPTHER

## 2019-07-05 RX ORDER — PANTOPRAZOLE SODIUM 40 MG/1
40 TABLET, DELAYED RELEASE ORAL DAILY
Qty: 30 TABLET | Refills: 0 | Status: SHIPPED | OUTPATIENT
Start: 2019-07-05 | End: 2019-08-02 | Stop reason: SDUPTHER

## 2019-07-05 RX ORDER — ATORVASTATIN CALCIUM 40 MG/1
40 TABLET, FILM COATED ORAL DAILY
Qty: 30 TABLET | Refills: 0 | Status: SHIPPED | OUTPATIENT
Start: 2019-07-05 | End: 2019-08-26 | Stop reason: SDUPTHER

## 2019-07-05 RX ORDER — LISINOPRIL 20 MG/1
20 TABLET ORAL DAILY
Qty: 30 TABLET | Refills: 0 | Status: SHIPPED | OUTPATIENT
Start: 2019-07-05 | End: 2019-08-06 | Stop reason: SDUPTHER

## 2019-07-05 RX ORDER — LOSARTAN POTASSIUM 100 MG/1
100 TABLET ORAL DAILY
COMMUNITY
End: 2019-07-05 | Stop reason: ALTCHOICE

## 2019-07-05 NOTE — ASSESSMENT & PLAN NOTE
Lab Results   Component Value Date    HGBA1C 12 8 (H) 06/09/2018     Hemoglobin A1c pending  Seems to be very poorly controlled with very poor inside from the patient  Decrease Lantus to 50 units b i d  Will start the patient on Jardiance 10 mg daily  Continue with metformin 1000 mg b i d    Will need extensive work to better educated the patient diabetes will refer the patient to nutritional therapy once there is a better grasp on his diabetes control as well as his A1c

## 2019-07-05 NOTE — ASSESSMENT & PLAN NOTE
Not control, blood pressure 158/72  Given the patient's current risk factors, blood pressure goal of less than 140/90  Again patient has very poor inside on his antihypertensives  For now will continue with amlodipine 10 mg daily as well as lisinopril 20 mg daily

## 2019-07-12 ENCOUNTER — TELEPHONE (OUTPATIENT)
Dept: FAMILY MEDICINE CLINIC | Facility: CLINIC | Age: 69
End: 2019-07-12

## 2019-07-12 DIAGNOSIS — Z79.4 TYPE 2 DIABETES MELLITUS WITH HYPERGLYCEMIA, WITH LONG-TERM CURRENT USE OF INSULIN (HCC): ICD-10-CM

## 2019-07-12 DIAGNOSIS — E11.65 TYPE 2 DIABETES MELLITUS WITH HYPERGLYCEMIA, WITH LONG-TERM CURRENT USE OF INSULIN (HCC): ICD-10-CM

## 2019-07-12 NOTE — TELEPHONE ENCOUNTER
Pt it exceeding maximum monthly allowance of Lantus per insurance  Prior Eddie Hawley is required to dispense to pt  Monthly limit is 30mL per 30 days  Another issue I saw is, the script send 7/5/19 has 2 different directions so that has to be fixed as well  50 units BID would be within the limit but 65, 55 would not

## 2019-07-15 NOTE — TELEPHONE ENCOUNTER
Lantus prior auth went through per pharmacy, pharmacy is filling now  Patient notified  He said he needs PPL shut off medical certification  Had Dr Flavia Wright sign it because you are on night float

## 2019-08-02 DIAGNOSIS — K21.9 GASTROESOPHAGEAL REFLUX DISEASE, ESOPHAGITIS PRESENCE NOT SPECIFIED: ICD-10-CM

## 2019-08-05 RX ORDER — PANTOPRAZOLE SODIUM 40 MG/1
40 TABLET, DELAYED RELEASE ORAL DAILY
Qty: 30 TABLET | Refills: 0 | Status: SHIPPED | OUTPATIENT
Start: 2019-08-05 | End: 2019-09-06 | Stop reason: SDUPTHER

## 2019-08-06 ENCOUNTER — HOSPITAL ENCOUNTER (EMERGENCY)
Facility: HOSPITAL | Age: 69
Discharge: HOME | End: 2019-08-06
Attending: EMERGENCY MEDICINE | Admitting: EMERGENCY MEDICINE
Payer: COMMERCIAL

## 2019-08-06 ENCOUNTER — APPOINTMENT (EMERGENCY)
Dept: CT IMAGING | Facility: HOSPITAL | Age: 69
End: 2019-08-06
Payer: COMMERCIAL

## 2019-08-06 VITALS
OXYGEN SATURATION: 98 % | SYSTOLIC BLOOD PRESSURE: 148 MMHG | DIASTOLIC BLOOD PRESSURE: 80 MMHG | WEIGHT: 185.41 LBS | RESPIRATION RATE: 16 BRPM | TEMPERATURE: 98.8 F | HEART RATE: 62 BPM

## 2019-08-06 DIAGNOSIS — Z79.4 TYPE 2 DIABETES MELLITUS WITH HYPERGLYCEMIA, WITH LONG-TERM CURRENT USE OF INSULIN (HCC): ICD-10-CM

## 2019-08-06 DIAGNOSIS — E11.65 TYPE 2 DIABETES MELLITUS WITH HYPERGLYCEMIA, WITH LONG-TERM CURRENT USE OF INSULIN (HCC): ICD-10-CM

## 2019-08-06 DIAGNOSIS — N23 RENAL COLIC: Primary | ICD-10-CM

## 2019-08-06 DIAGNOSIS — I10 ESSENTIAL HYPERTENSION: ICD-10-CM

## 2019-08-06 LAB
ANION GAP SERPL CALCULATED.3IONS-SCNC: 8 MMOL/L (ref 5–14)
BACTERIA UR QL AUTO: ABNORMAL /HPF
BASOPHILS # BLD AUTO: 0.1 THOUSANDS/ΜL (ref 0–0.1)
BASOPHILS NFR BLD AUTO: 1 % (ref 0–1)
BILIRUB UR QL STRIP: NEGATIVE
BUN SERPL-MCNC: 35 MG/DL (ref 5–25)
CALCIUM SERPL-MCNC: 9.4 MG/DL (ref 8.4–10.2)
CHLORIDE SERPL-SCNC: 99 MMOL/L (ref 97–108)
CLARITY UR: CLEAR
CO2 SERPL-SCNC: 31 MMOL/L (ref 22–30)
COLOR UR: YELLOW
CREAT SERPL-MCNC: 1.32 MG/DL (ref 0.7–1.5)
EOSINOPHIL # BLD AUTO: 0.1 THOUSAND/ΜL (ref 0–0.4)
EOSINOPHIL NFR BLD AUTO: 1 % (ref 0–6)
ERYTHROCYTE [DISTWIDTH] IN BLOOD BY AUTOMATED COUNT: 15.9 %
GFR SERPL CREATININE-BSD FRML MDRD: 54 ML/MIN/1.73SQ M
GLUCOSE SERPL-MCNC: 177 MG/DL (ref 70–99)
GLUCOSE UR STRIP-MCNC: ABNORMAL MG/DL
HCT VFR BLD AUTO: 36.3 % (ref 41–53)
HGB BLD-MCNC: 11.6 G/DL (ref 13.5–17.5)
HGB UR QL STRIP.AUTO: 25
KETONES UR STRIP-MCNC: NEGATIVE MG/DL
LEUKOCYTE ESTERASE UR QL STRIP: 500
LYMPHOCYTES # BLD AUTO: 2.8 THOUSANDS/ΜL (ref 0.5–4)
LYMPHOCYTES NFR BLD AUTO: 29 % (ref 25–45)
MCH RBC QN AUTO: 27.7 PG (ref 26–34)
MCHC RBC AUTO-ENTMCNC: 32 G/DL (ref 31–36)
MCV RBC AUTO: 87 FL (ref 80–100)
MONOCYTES # BLD AUTO: 0.9 THOUSAND/ΜL (ref 0.2–0.9)
MONOCYTES NFR BLD AUTO: 9 % (ref 1–10)
MUCOUS THREADS UR QL AUTO: ABNORMAL
NEUTROPHILS # BLD AUTO: 5.8 THOUSANDS/ΜL (ref 1.8–7.8)
NEUTS SEG NFR BLD AUTO: 60 % (ref 45–65)
NITRITE UR QL STRIP: NEGATIVE
NON-SQ EPI CELLS URNS QL MICRO: ABNORMAL /HPF
PH UR STRIP.AUTO: 5 [PH]
PLATELET # BLD AUTO: 226 THOUSANDS/UL (ref 150–450)
PMV BLD AUTO: 8.8 FL (ref 8.9–12.7)
POTASSIUM SERPL-SCNC: 3.8 MMOL/L (ref 3.6–5)
PROT UR STRIP-MCNC: ABNORMAL MG/DL
RBC # BLD AUTO: 4.19 MILLION/UL (ref 4.5–5.9)
RBC #/AREA URNS AUTO: ABNORMAL /HPF
SODIUM SERPL-SCNC: 138 MMOL/L (ref 137–147)
SP GR UR STRIP.AUTO: 1.01 (ref 1–1.04)
UROBILINOGEN UA: NEGATIVE MG/DL
WBC # BLD AUTO: 9.7 THOUSAND/UL (ref 4.5–11)
WBC #/AREA URNS AUTO: ABNORMAL /HPF

## 2019-08-06 PROCEDURE — 99284 EMERGENCY DEPT VISIT MOD MDM: CPT | Performed by: EMERGENCY MEDICINE

## 2019-08-06 PROCEDURE — 81001 URINALYSIS AUTO W/SCOPE: CPT | Performed by: EMERGENCY MEDICINE

## 2019-08-06 PROCEDURE — 74176 CT ABD & PELVIS W/O CONTRAST: CPT

## 2019-08-06 PROCEDURE — 96374 THER/PROPH/DIAG INJ IV PUSH: CPT

## 2019-08-06 PROCEDURE — 85025 COMPLETE CBC W/AUTO DIFF WBC: CPT | Performed by: EMERGENCY MEDICINE

## 2019-08-06 PROCEDURE — 80048 BASIC METABOLIC PNL TOTAL CA: CPT | Performed by: EMERGENCY MEDICINE

## 2019-08-06 PROCEDURE — 99284 EMERGENCY DEPT VISIT MOD MDM: CPT

## 2019-08-06 PROCEDURE — 36415 COLL VENOUS BLD VENIPUNCTURE: CPT | Performed by: EMERGENCY MEDICINE

## 2019-08-06 PROCEDURE — 81003 URINALYSIS AUTO W/O SCOPE: CPT | Performed by: EMERGENCY MEDICINE

## 2019-08-06 RX ORDER — KETOROLAC TROMETHAMINE 30 MG/ML
15 INJECTION, SOLUTION INTRAMUSCULAR; INTRAVENOUS ONCE
Status: COMPLETED | OUTPATIENT
Start: 2019-08-06 | End: 2019-08-06

## 2019-08-06 RX ORDER — ACETAMINOPHEN AND CODEINE PHOSPHATE 300; 30 MG/1; MG/1
1-2 TABLET ORAL EVERY 6 HOURS PRN
Qty: 15 TABLET | Refills: 0 | Status: SHIPPED | OUTPATIENT
Start: 2019-08-06 | End: 2019-11-25

## 2019-08-06 RX ORDER — LISINOPRIL 20 MG/1
20 TABLET ORAL DAILY
Qty: 30 TABLET | Refills: 0 | Status: SHIPPED | OUTPATIENT
Start: 2019-08-06 | End: 2019-09-06 | Stop reason: SDUPTHER

## 2019-08-06 RX ORDER — ASPIRIN 81 MG/1
81 TABLET ORAL DAILY
Qty: 30 TABLET | Refills: 0 | Status: SHIPPED | OUTPATIENT
Start: 2019-08-06 | End: 2019-09-06 | Stop reason: SDUPTHER

## 2019-08-06 RX ORDER — CEPHALEXIN 250 MG/1
500 CAPSULE ORAL 4 TIMES DAILY
Qty: 56 CAPSULE | Refills: 0 | Status: SHIPPED | OUTPATIENT
Start: 2019-08-06 | End: 2019-08-13

## 2019-08-06 RX ADMIN — KETOROLAC TROMETHAMINE 15 MG: 30 INJECTION, SOLUTION INTRAMUSCULAR at 21:10

## 2019-08-07 NOTE — ED PROVIDER NOTES
History  Chief Complaint   Patient presents with    Back Pain     back pain, lower abdominal pain, pain goes away with tylenol, last used this morning     Patient is a 73-year-old male with a history kidney stones who presents with a 3 day history of bilateral flank pain that radiates to lower abdomen  States constant achy pain that is improved with Tylenol  States he urinates light due to his diabetes unknown if it is any more increased than normal   Denies any hematuria or dysuria  No nausea vomiting diarrhea  Again pain relieved with Tylenol  States feels similar to when he had stones in the past   Nothing seems to make the pain worse  Patient denies any fevers sweats or chills  Prior to Admission Medications   Prescriptions Last Dose Informant Patient Reported? Taking?    Albuterol Sulfate 108 (90 Base) MCG/ACT AEPB   No Yes   Sig: Inhale 1 puff every 6 (six) hours as needed (shortness of breath)   Alcohol Swabs (ALCOHOL PREP) PADS   No Yes   Sig: by Does not apply route daily   amLODIPine (NORVASC) 10 mg tablet   No Yes   Sig: Take 1 tablet (10 mg total) by mouth daily   aspirin (ECOTRIN LOW STRENGTH) 81 mg EC tablet   No Yes   Sig: Take 1 tablet (81 mg total) by mouth daily   atorvastatin (LIPITOR) 40 mg tablet   No Yes   Sig: Take 1 tablet (40 mg total) by mouth daily   glucose blood (FREESTYLE LITE) test strip   No No   Si each by Other route 3 (three) times a day   insulin glargine (LANTUS SOLOSTAR) 100 units/mL injection pen   No Yes   Sig: Inject 50 Units under the skin every 12 (twelve) hours   Patient taking differently: Inject 55 Units under the skin every 12 (twelve) hours    lisinopril (ZESTRIL) 20 mg tablet   No Yes   Sig: Take 1 tablet (20 mg total) by mouth daily   metFORMIN (GLUCOPHAGE) 1000 MG tablet   No Yes   Sig: Take 1 tablet (1,000 mg total) by mouth 2 (two) times a day with meals   pantoprazole (PROTONIX) 40 mg tablet   No Yes   Sig: Take 1 tablet (40 mg total) by mouth daily      Facility-Administered Medications: None       Past Medical History:   Diagnosis Date    Diabetes mellitus ([facility] [address] )        Past Surgical History:   Procedure Laterality Date    APPENDECTOMY Right 1970    PARAMEDIAN INCISION       History reviewed  No pertinent family history  I have reviewed and agree with the history as documented  Social History     Tobacco Use    Smoking status: Former Smoker     Types: Cigarettes    Smokeless tobacco: Former User    Tobacco comment: Quit 2001   Substance Use Topics    Alcohol use: Never     Frequency: Never    Drug use: Never        Review of Systems   Constitutional: Negative  Negative for chills and fever  HENT: Negative  Eyes: Negative  Respiratory: Negative  Negative for cough  Cardiovascular: Negative  Negative for chest pain  Gastrointestinal: Positive for abdominal pain  Negative for diarrhea, nausea and vomiting  Endocrine: Negative  Genitourinary: Positive for flank pain and frequency  Skin: Negative  Allergic/Immunologic: Negative  Neurological: Negative  Hematological: Negative  Psychiatric/Behavioral: Negative  All other systems reviewed and are negative  Physical Exam  Physical Exam   Constitutional: He is oriented to person, place, and time  He appears well-developed and well-nourished  HENT:   Head: Normocephalic  Nose: Nose normal    Mouth/Throat: Oropharynx is clear and moist    Eyes: Pupils are equal, round, and reactive to light  Conjunctivae are normal    Neck: Normal range of motion  Neck supple  Cardiovascular: Normal rate, regular rhythm, normal heart sounds and intact distal pulses  Pulmonary/Chest: Effort normal and breath sounds normal    Abdominal: Soft  Bowel sounds are normal  He exhibits no distension and no mass  There is no tenderness  There is no rebound and no guarding  Patient with bilateral CVA tenderness palpation    Abdomen soft nontender nondistended no guarding no rebound  Musculoskeletal: Normal range of motion  Neurological: He is alert and oriented to person, place, and time  Skin: Skin is warm and dry  Capillary refill takes less than 2 seconds  Nursing note and vitals reviewed        Vital Signs  ED Triage Vitals   Temperature Pulse Respirations Blood Pressure SpO2   08/06/19 2057 08/06/19 2057 08/06/19 2057 08/06/19 2057 08/06/19 2057   98 8 °F (37 1 °C) 91 18 168/74 100 %      Temp Source Heart Rate Source Patient Position - Orthostatic VS BP Location FiO2 (%)   08/06/19 2057 08/06/19 2057 08/06/19 2057 08/06/19 2057 --   Tympanic Monitor Lying Left arm       Pain Score       08/06/19 2110       8           Vitals:    08/06/19 2057   BP: 168/74   Pulse: 91   Patient Position - Orthostatic VS: Lying         Visual Acuity      ED Medications  Medications   ketorolac (TORADOL) injection 15 mg (15 mg Intravenous Given 8/6/19 2110)       Diagnostic Studies  Results Reviewed     Procedure Component Value Units Date/Time    Urine Microscopic [[id number]]  (Abnormal) Collected:  08/06/19 2107    Lab Status:  Final result Specimen:  Urine, Clean Catch Updated:  08/06/19 2137     RBC, UA 2-4 /hpf      WBC, UA 4-10 /hpf      Epithelial Cells Moderate /hpf      Bacteria, UA None Seen /hpf      MUCUS THREADS Occasional    Basic metabolic panel [[id number]]  (Abnormal) Collected:  08/06/19 2107    Lab Status:  Final result Specimen:  Blood from Arm, Left Updated:  08/06/19 2131     Sodium 138 mmol/L      Potassium 3 8 mmol/L      Chloride 99 mmol/L      CO2 31 mmol/L      ANION GAP 8 mmol/L      BUN 35 mg/dL      Creatinine 1 32 mg/dL      Glucose 177 mg/dL      Calcium 9 4 mg/dL      eGFR 54 ml/min/1 73sq m     Narrative:       [place] guidelines for Chronic Kidney Disease (CKD):     Stage 1 with normal or high GFR (GFR > 90 mL/min/1 73 square meters)    Stage 2 Mild CKD (GFR = 60-89 mL/min/1 73 square meters)    Stage 3A Moderate CKD (GFR = 45-59 mL/min/1 73 square meters)    Stage 3B Moderate CKD (GFR = 30-44 mL/min/1 73 square meters)    Stage 4 Severe CKD (GFR = 15-29 mL/min/1 73 square meters)    Stage 5 End Stage CKD (GFR <15 mL/min/1 73 square meters)  Note: GFR calculation is accurate only with a steady state creatinine    UA w Reflex to Microscopic w Reflex to Culture [[id number]]  (Abnormal) Collected:  08/06/19 2107    Lab Status:  Final result Specimen:  Urine, Clean Catch Updated:  08/06/19 2122     Color, UA Yellow     Clarity, UA Clear     Specific Gravity, UA 1 015     pH, UA 5 0     Leukocytes,  0     Nitrite, UA Negative     Protein, UA 15 (Trace) mg/dl      Glucose, UA 50 (1/25%) mg/dl      Ketones, UA Negative mg/dl      Bilirubin, UA Negative     Blood, UA 25 0     UROBILINOGEN UA Negative mg/dL     CBC and differential [[id number]]  (Abnormal) Collected:  08/06/19 2107    Lab Status:  Final result Specimen:  Blood from Arm, Left Updated:  08/06/19 2122     WBC 9 70 Thousand/uL      RBC 4 19 Million/uL      Hemoglobin 11 6 g/dL      Hematocrit 36 3 %      MCV 87 fL      MCH 27 7 pg      MCHC 32 0 g/dL      RDW 15 9 %      MPV 8 8 fL      Platelets 324 Thousands/uL      Neutrophils Relative 60 %      Lymphocytes Relative 29 %      Monocytes Relative 9 %      Eosinophils Relative 1 %      Basophils Relative 1 %      Neutrophils Absolute 5 80 Thousands/µL      Lymphocytes Absolute 2 80 Thousands/µL      Monocytes Absolute 0 90 Thousand/µL      Eosinophils Absolute 0 10 Thousand/µL      Basophils Absolute 0 10 Thousands/µL                  CT renal stone study abdomen pelvis wo contrast   Final Result by Janae East MD (08/06 2135)      Punctate left renal calculi with a nonobstructive 2 mm calculus within the proximal left ureter  No significant hydronephrosis  Mild left periureteral stranding which may reflect recently passed calculus               Workstation performed: [id number]                    Procedures  Procedures ED Course  ED Course as of Aug 06 2157   Tue Aug 06, 2019   2155 Patient feeling improved after medication 1 over results  Will discharge with urology follow up  MDM  Number of Diagnoses or Management Options  Renal colic:      Amount and/or Complexity of Data Reviewed  Clinical lab tests: ordered and reviewed  Tests in the radiology section of CPT®: reviewed and ordered  Tests in the medicine section of CPT®: reviewed and ordered  Review and summarize past medical records: yes  Independent visualization of images, tracings, or specimens: yes        Disposition  Final diagnoses:   Renal colic     Time reflects when diagnosis was documented in both MDM as applicable and the Disposition within this note     Time User Action Codes Description Comment    8/6/2019  9:55 PM Ismael Szymanski Add [E98] Renal colic       ED Disposition     ED Disposition Condition Date/Time Comment    Discharge Stable Tue Aug 6, 2019  9:55 PM Venkatesh Madsen discharge to home/self care  Follow-up Information     Follow up With Specialties Details Why Contact Info    Deshawn Perry MD Urology   [address] [facility] [address]  [phone]            Patient's Medications   Discharge Prescriptions    ACETAMINOPHEN-CODEINE (TYLENOL #3) 300-30 MG PER TABLET    Take 1-2 tablets by mouth every 6 (six) hours as needed for moderate pain for up to 15 doses       Start Date: 8/6/2019  End Date: --       Order Dose: 1-2 tablets       Quantity: 15 tablet    Refills: 0    CEPHALEXIN (KEFLEX) 250 MG CAPSULE    Take 2 capsules (500 mg total) by mouth 4 (four) times a day for 7 days       Start Date: 8/6/2019  End Date: 8/13/2019       Order Dose: 500 mg       Quantity: 56 capsule    Refills: 0     No discharge procedures on file      ED Provider  Electronically Signed by           David Valera MD  08/06/19 6071

## 2019-08-16 DIAGNOSIS — R06.02 SHORTNESS OF BREATH: ICD-10-CM

## 2019-08-26 DIAGNOSIS — Z79.4 TYPE 2 DIABETES MELLITUS WITH HYPERGLYCEMIA, WITH LONG-TERM CURRENT USE OF INSULIN (HCC): ICD-10-CM

## 2019-08-26 DIAGNOSIS — I10 ESSENTIAL HYPERTENSION: ICD-10-CM

## 2019-08-26 DIAGNOSIS — E11.65 TYPE 2 DIABETES MELLITUS WITH HYPERGLYCEMIA, WITH LONG-TERM CURRENT USE OF INSULIN (HCC): ICD-10-CM

## 2019-08-26 RX ORDER — ATORVASTATIN CALCIUM 40 MG/1
40 TABLET, FILM COATED ORAL DAILY
Qty: 30 TABLET | Refills: 1 | Status: SHIPPED | OUTPATIENT
Start: 2019-08-26 | End: 2019-10-16 | Stop reason: SDUPTHER

## 2019-08-26 RX ORDER — AMLODIPINE BESYLATE 10 MG/1
10 TABLET ORAL DAILY
Qty: 30 TABLET | Refills: 1 | Status: SHIPPED | OUTPATIENT
Start: 2019-08-26 | End: 2019-10-16 | Stop reason: SDUPTHER

## 2019-09-06 DIAGNOSIS — Z79.4 TYPE 2 DIABETES MELLITUS WITH HYPERGLYCEMIA, WITH LONG-TERM CURRENT USE OF INSULIN (HCC): ICD-10-CM

## 2019-09-06 DIAGNOSIS — K21.9 GASTROESOPHAGEAL REFLUX DISEASE, ESOPHAGITIS PRESENCE NOT SPECIFIED: ICD-10-CM

## 2019-09-06 DIAGNOSIS — E11.65 TYPE 2 DIABETES MELLITUS WITH HYPERGLYCEMIA, WITH LONG-TERM CURRENT USE OF INSULIN (HCC): ICD-10-CM

## 2019-09-06 DIAGNOSIS — I10 ESSENTIAL HYPERTENSION: ICD-10-CM

## 2019-09-06 RX ORDER — PANTOPRAZOLE SODIUM 40 MG/1
40 TABLET, DELAYED RELEASE ORAL DAILY
Qty: 30 TABLET | Refills: 0 | Status: SHIPPED | OUTPATIENT
Start: 2019-09-06 | End: 2019-10-10 | Stop reason: SDUPTHER

## 2019-09-06 RX ORDER — LISINOPRIL 20 MG/1
20 TABLET ORAL DAILY
Qty: 30 TABLET | Refills: 0 | Status: SHIPPED | OUTPATIENT
Start: 2019-09-06 | End: 2019-10-10 | Stop reason: SDUPTHER

## 2019-09-06 RX ORDER — ASPIRIN 81 MG/1
81 TABLET ORAL DAILY
Qty: 30 TABLET | Refills: 0 | Status: SHIPPED | OUTPATIENT
Start: 2019-09-06 | End: 2019-10-10 | Stop reason: SDUPTHER

## 2019-09-19 ENCOUNTER — TELEPHONE (OUTPATIENT)
Dept: FAMILY MEDICINE CLINIC | Facility: CLINIC | Age: 69
End: 2019-09-19

## 2019-09-19 DIAGNOSIS — E11.65 TYPE 2 DIABETES MELLITUS WITH HYPERGLYCEMIA, WITH LONG-TERM CURRENT USE OF INSULIN (HCC): Primary | ICD-10-CM

## 2019-09-19 DIAGNOSIS — Z79.4 TYPE 2 DIABETES MELLITUS WITH HYPERGLYCEMIA, WITH LONG-TERM CURRENT USE OF INSULIN (HCC): Primary | ICD-10-CM

## 2019-09-28 DIAGNOSIS — Z79.4 TYPE 2 DIABETES MELLITUS WITH HYPERGLYCEMIA, WITH LONG-TERM CURRENT USE OF INSULIN (HCC): ICD-10-CM

## 2019-09-28 DIAGNOSIS — K21.9 GASTROESOPHAGEAL REFLUX DISEASE, ESOPHAGITIS PRESENCE NOT SPECIFIED: ICD-10-CM

## 2019-09-28 DIAGNOSIS — E11.65 TYPE 2 DIABETES MELLITUS WITH HYPERGLYCEMIA, WITH LONG-TERM CURRENT USE OF INSULIN (HCC): ICD-10-CM

## 2019-09-28 DIAGNOSIS — I10 ESSENTIAL HYPERTENSION: ICD-10-CM

## 2019-10-06 PROBLEM — E78.2 MIXED HYPERLIPIDEMIA: Status: ACTIVE | Noted: 2019-02-04

## 2019-10-06 PROBLEM — N18.2 CKD (CHRONIC KIDNEY DISEASE) STAGE 2, GFR 60-89 ML/MIN: Status: ACTIVE | Noted: 2019-10-06

## 2019-10-10 PROCEDURE — 4010F ACE/ARB THERAPY RXD/TAKEN: CPT | Performed by: FAMILY MEDICINE

## 2019-10-10 RX ORDER — PANTOPRAZOLE SODIUM 40 MG/1
40 TABLET, DELAYED RELEASE ORAL DAILY
Qty: 30 TABLET | Refills: 5 | Status: SHIPPED | OUTPATIENT
Start: 2019-10-10 | End: 2020-10-13 | Stop reason: SDUPTHER

## 2019-10-10 RX ORDER — LISINOPRIL 20 MG/1
20 TABLET ORAL DAILY
Qty: 30 TABLET | Refills: 2 | Status: SHIPPED | OUTPATIENT
Start: 2019-10-10 | End: 2020-01-13

## 2019-10-10 RX ORDER — ASPIRIN 81 MG/1
81 TABLET ORAL DAILY
Qty: 30 TABLET | Refills: 11 | Status: SHIPPED | OUTPATIENT
Start: 2019-10-10 | End: 2020-08-10

## 2019-10-16 DIAGNOSIS — E11.65 TYPE 2 DIABETES MELLITUS WITH HYPERGLYCEMIA, WITH LONG-TERM CURRENT USE OF INSULIN (HCC): ICD-10-CM

## 2019-10-16 DIAGNOSIS — Z79.4 TYPE 2 DIABETES MELLITUS WITH HYPERGLYCEMIA, WITH LONG-TERM CURRENT USE OF INSULIN (HCC): ICD-10-CM

## 2019-10-16 DIAGNOSIS — I10 ESSENTIAL HYPERTENSION: ICD-10-CM

## 2019-10-17 RX ORDER — AMLODIPINE BESYLATE 10 MG/1
10 TABLET ORAL DAILY
Qty: 30 TABLET | Refills: 0 | Status: SHIPPED | OUTPATIENT
Start: 2019-10-17 | End: 2019-11-15 | Stop reason: SDUPTHER

## 2019-10-17 RX ORDER — ATORVASTATIN CALCIUM 40 MG/1
40 TABLET, FILM COATED ORAL DAILY
Qty: 30 TABLET | Refills: 0 | Status: SHIPPED | OUTPATIENT
Start: 2019-10-17 | End: 2019-11-15 | Stop reason: SDUPTHER

## 2019-11-01 ENCOUNTER — TELEPHONE (OUTPATIENT)
Dept: FAMILY MEDICINE CLINIC | Facility: CLINIC | Age: 69
End: 2019-11-01

## 2019-11-01 DIAGNOSIS — Z79.4 TYPE 2 DIABETES MELLITUS WITH HYPERGLYCEMIA, WITH LONG-TERM CURRENT USE OF INSULIN (HCC): ICD-10-CM

## 2019-11-01 DIAGNOSIS — E11.65 TYPE 2 DIABETES MELLITUS WITH HYPERGLYCEMIA, WITH LONG-TERM CURRENT USE OF INSULIN (HCC): ICD-10-CM

## 2019-11-04 RX ORDER — INSULIN GLARGINE 100 [IU]/ML
INJECTION, SOLUTION SUBCUTANEOUS
Qty: 15 ML | Refills: 0 | Status: SHIPPED | OUTPATIENT
Start: 2019-11-04 | End: 2019-11-21 | Stop reason: SDUPTHER

## 2019-11-04 NOTE — TELEPHONE ENCOUNTER
Please schedule this patient with me for DM re-evaluation at the earliest possible time without double booking       Sent to Clerical team on 11/04/19 7:31 AM

## 2019-11-11 DIAGNOSIS — Z79.4 TYPE 2 DIABETES MELLITUS WITH HYPERGLYCEMIA, WITH LONG-TERM CURRENT USE OF INSULIN (HCC): ICD-10-CM

## 2019-11-11 DIAGNOSIS — E11.65 TYPE 2 DIABETES MELLITUS WITH HYPERGLYCEMIA, WITH LONG-TERM CURRENT USE OF INSULIN (HCC): ICD-10-CM

## 2019-11-11 RX ORDER — EMPAGLIFLOZIN 10 MG/1
TABLET, FILM COATED ORAL
Qty: 30 TABLET | Refills: 0 | Status: SHIPPED | OUTPATIENT
Start: 2019-11-11 | End: 2019-12-11 | Stop reason: SDUPTHER

## 2019-11-12 ENCOUNTER — TELEPHONE (OUTPATIENT)
Dept: FAMILY MEDICINE CLINIC | Facility: CLINIC | Age: 69
End: 2019-11-12

## 2019-11-15 DIAGNOSIS — Z79.4 TYPE 2 DIABETES MELLITUS WITH HYPERGLYCEMIA, WITH LONG-TERM CURRENT USE OF INSULIN (HCC): ICD-10-CM

## 2019-11-15 DIAGNOSIS — E11.65 TYPE 2 DIABETES MELLITUS WITH HYPERGLYCEMIA, WITH LONG-TERM CURRENT USE OF INSULIN (HCC): ICD-10-CM

## 2019-11-15 DIAGNOSIS — I10 ESSENTIAL HYPERTENSION: ICD-10-CM

## 2019-11-15 RX ORDER — AMLODIPINE BESYLATE 10 MG/1
TABLET ORAL
Qty: 30 TABLET | Refills: 0 | Status: SHIPPED | OUTPATIENT
Start: 2019-11-15 | End: 2019-12-17 | Stop reason: SDUPTHER

## 2019-11-15 RX ORDER — ATORVASTATIN CALCIUM 40 MG/1
TABLET, FILM COATED ORAL
Qty: 30 TABLET | Refills: 0 | Status: SHIPPED | OUTPATIENT
Start: 2019-11-15 | End: 2020-07-08

## 2019-11-21 DIAGNOSIS — E11.65 TYPE 2 DIABETES MELLITUS WITH HYPERGLYCEMIA, WITH LONG-TERM CURRENT USE OF INSULIN (HCC): ICD-10-CM

## 2019-11-21 DIAGNOSIS — Z79.4 TYPE 2 DIABETES MELLITUS WITH HYPERGLYCEMIA, WITH LONG-TERM CURRENT USE OF INSULIN (HCC): ICD-10-CM

## 2019-11-22 NOTE — PROGRESS NOTES
Assessment/Plan:    Type 2 diabetes mellitus with hyperglycemia, with long-term current use of insulin ([facility])    Lab Results   Component Value Date    HGBA1C 10 7 (A) 11/25/2019     Poorly controlled  Patient claims to be on Lantus 50 units b i d , metformin but he is uncertain about other medications as well as dosages and frequency  Given the high dosage of insulin the patient is on in combination with his diabetes being on control, will refer the patient to Endocrinology  For now continue with aspirin and Lipitor  Blood pressure not at goal, as noted per management  Advised the patient about importance a yearly eye examination    Essential hypertension  Not control, blood pressure 158/74  Given the patient's current age and risk factors, blood pressure goal of less than 140/90  Patient does not know what medications he is supposed to be on, did advised patient to go home and call our office with a complete list of medications  Will need to adjust his medications to obtain better control of his blood pressure once the list is available      Return in about 3 months (around 2/25/2020) for Annual physical       Diagnoses and all orders for this visit:    Type 2 diabetes mellitus with hyperglycemia, with long-term current use of [facility]  -     POCT hemoglobin A1c  -     Ambulatory referral to Endocrinology; Future  -     Cancel: TDAP VACCINE GREATER THAN OR EQUAL TO 8YO IM  -     influenza vaccine, [id number], high-dose, PF 0 5 mL (FLUZONE HIGH-DOSE)  -     tetanus-diphtheria-acellular pertussis (ADACEL) 5-2-15 5 LF-mcg/0 5 injection; Inject 0 5 mL into a muscle once for 1 dose    Essential hypertension        Subjective:     Jodie Acosta is a 68 y o  male who  has a past medical history of Diabetes mellitus ([facility] [address] )  who presented to the office today for diabetes re-evaluation      HPI  Patient mentioned that his fasting blood glucose tends to be between 122-200 and his bedtime blood glucose stent average 150-160  He denied any signs or symptoms of hyper hypoglycemia  The following portions of the patient's history were reviewed and updated as appropriate: allergies, current medications, past family history, past medical history, past social history, past surgical history and problem list       Current Outpatient Medications on File Prior to Visit   Medication Sig Dispense Refill    Albuterol Sulfate 108 (90 Base) MCG/ACT AEPB Inhale 1 puff every 6 (six) hours as needed (shortness of breath) 1 each 3    Alcohol Swabs (ALCOHOL PREP) PADS by Does not apply route daily 100 each 3    amLODIPine (NORVASC) 10 mg tablet TAKE ONE TABLET BY MOUTH ONCE DAILY 30 tablet 0    aspirin (ECOTRIN LOW STRENGTH) 81 mg EC tablet Take 1 tablet (81 mg total) by mouth daily 30 tablet 11    atorvastatin (LIPITOR) 40 mg tablet TAKE ONE TABLET BY MOUTH ONCE DAILY 30 tablet 0    glucose blood (FREESTYLE LITE) test strip 1 each by Other route 4 (four) times a day 100 each 11    insulin glargine (LANTUS SOLOSTAR) 100 units/mL injection pen Inject 50 Units under the skin every 12 (twelve) hours 10 pen 0    Insulin Pen Needle (PEN NEEDLES) 31G X 8 MM MISC use as directed      JARDIANCE 10 MG TABS TAKE ONE TABLET BY MOUTH EVERY MORNING 30 tablet 0    lisinopril (ZESTRIL) 20 mg tablet Take 1 tablet (20 mg total) by mouth daily 30 tablet 2    metFORMIN (GLUCOPHAGE) 1000 MG tablet TAKE ONE TABLET BY MOUTH TWICE A DAY WITH MEALS 60 tablet 0    pantoprazole (PROTONIX) 40 mg tablet Take 1 tablet (40 mg total) by mouth daily 30 tablet 5    SURE COMFORT LANCETS 30G MISC use as directed      [DISCONTINUED] acetaminophen-codeine (TYLENOL #3) 300-30 mg per tablet Take 1-2 tablets by mouth every 6 (six) hours as needed for moderate pain for up to 15 doses 15 tablet 0     No current facility-administered medications on file prior to visit          Review of Systems   Constitutional: Negative for activity change, appetite change, chills and fever    HENT: Negative for trouble swallowing  Eyes: Negative for visual disturbance  Respiratory: Negative for chest tightness  Cardiovascular: Negative for chest pain, palpitations and leg swelling  Gastrointestinal: Negative for abdominal pain, constipation, nausea and vomiting  Skin: Negative for rash  Neurological: Negative for weakness  Hematological: Negative for adenopathy  Objective:    /74   Pulse 74   Temp (!) 97 °F (36 1 °C) (Temporal)   Resp 16   Wt 86 6 kg (191 lb)   SpO2 98%       Physical Exam   Constitutional: He is oriented to person, place, and time  He appears well-developed and well-nourished  No distress  HENT:   Head: Normocephalic and atraumatic  Nose: Nose normal    Eyes: Pupils are equal, round, and reactive to light  Conjunctivae are normal    Neck: Normal range of motion  Neck supple  Cardiovascular: Normal rate, regular rhythm and normal heart sounds  Exam reveals no gallop and no friction rub  No murmur heard  Pulmonary/Chest: Effort normal and breath sounds normal  No respiratory distress  He has no wheezes  He has no rales  Abdominal: Soft  Bowel sounds are normal  He exhibits no distension  There is no tenderness  Musculoskeletal: Normal range of motion  He exhibits no edema  Neurological: He is alert and oriented to person, place, and time  Skin: Skin is warm and dry  No rash noted  He is not diaphoretic  No erythema         Krunal Fung MD  11/25/19  8:59 AM

## 2019-11-25 ENCOUNTER — OFFICE VISIT (OUTPATIENT)
Dept: FAMILY MEDICINE CLINIC | Facility: CLINIC | Age: 69
End: 2019-11-25

## 2019-11-25 VITALS
WEIGHT: 191 LBS | DIASTOLIC BLOOD PRESSURE: 74 MMHG | OXYGEN SATURATION: 98 % | HEART RATE: 74 BPM | TEMPERATURE: 97 F | SYSTOLIC BLOOD PRESSURE: 158 MMHG | RESPIRATION RATE: 16 BRPM

## 2019-11-25 DIAGNOSIS — E11.65 TYPE 2 DIABETES MELLITUS WITH HYPERGLYCEMIA, WITH LONG-TERM CURRENT USE OF INSULIN (HCC): Primary | ICD-10-CM

## 2019-11-25 DIAGNOSIS — I10 ESSENTIAL HYPERTENSION: ICD-10-CM

## 2019-11-25 DIAGNOSIS — Z79.4 TYPE 2 DIABETES MELLITUS WITH HYPERGLYCEMIA, WITH LONG-TERM CURRENT USE OF INSULIN (HCC): Primary | ICD-10-CM

## 2019-11-25 LAB — SL AMB POCT HEMOGLOBIN AIC: 10.7 (ref ?–6.5)

## 2019-11-25 PROCEDURE — 99213 OFFICE O/P EST LOW 20 MIN: CPT | Performed by: INTERNAL MEDICINE

## 2019-11-25 PROCEDURE — 1160F RVW MEDS BY RX/DR IN RCRD: CPT | Performed by: INTERNAL MEDICINE

## 2019-11-25 PROCEDURE — 83036 HEMOGLOBIN GLYCOSYLATED A1C: CPT | Performed by: INTERNAL MEDICINE

## 2019-11-25 PROCEDURE — 1036F TOBACCO NON-USER: CPT | Performed by: INTERNAL MEDICINE

## 2019-11-25 PROCEDURE — G0008 ADMIN INFLUENZA VIRUS VAC: HCPCS | Performed by: INTERNAL MEDICINE

## 2019-11-25 PROCEDURE — 3046F HEMOGLOBIN A1C LEVEL >9.0%: CPT | Performed by: INTERNAL MEDICINE

## 2019-11-25 PROCEDURE — 90662 IIV NO PRSV INCREASED AG IM: CPT | Performed by: INTERNAL MEDICINE

## 2019-11-25 NOTE — ASSESSMENT & PLAN NOTE
Lab Results   Component Value Date    HGBA1C 10 7 (A) 11/25/2019     Poorly controlled  Patient claims to be on Lantus 50 units b i d , metformin but he is uncertain about other medications as well as dosages and frequency  Given the high dosage of insulin the patient is on in combination with his diabetes being on control, will refer the patient to Endocrinology  For now continue with aspirin and Lipitor  Blood pressure not at goal, as noted per management  Advised the patient about importance a yearly eye examination

## 2019-12-11 DIAGNOSIS — Z79.4 TYPE 2 DIABETES MELLITUS WITH HYPERGLYCEMIA, WITH LONG-TERM CURRENT USE OF INSULIN (HCC): ICD-10-CM

## 2019-12-11 DIAGNOSIS — E11.65 TYPE 2 DIABETES MELLITUS WITH HYPERGLYCEMIA, WITH LONG-TERM CURRENT USE OF INSULIN (HCC): ICD-10-CM

## 2019-12-17 DIAGNOSIS — I10 ESSENTIAL HYPERTENSION: ICD-10-CM

## 2019-12-17 DIAGNOSIS — Z79.4 TYPE 2 DIABETES MELLITUS WITH HYPERGLYCEMIA, WITH LONG-TERM CURRENT USE OF INSULIN (HCC): ICD-10-CM

## 2019-12-17 DIAGNOSIS — E11.65 TYPE 2 DIABETES MELLITUS WITH HYPERGLYCEMIA, WITH LONG-TERM CURRENT USE OF INSULIN (HCC): ICD-10-CM

## 2019-12-17 RX ORDER — AMLODIPINE BESYLATE 10 MG/1
TABLET ORAL
Qty: 30 TABLET | Refills: 0 | Status: SHIPPED | OUTPATIENT
Start: 2019-12-17 | End: 2020-01-23

## 2019-12-31 DIAGNOSIS — E11.65 TYPE 2 DIABETES MELLITUS WITH HYPERGLYCEMIA, WITH LONG-TERM CURRENT USE OF INSULIN (HCC): ICD-10-CM

## 2019-12-31 DIAGNOSIS — Z79.4 TYPE 2 DIABETES MELLITUS WITH HYPERGLYCEMIA, WITH LONG-TERM CURRENT USE OF INSULIN (HCC): ICD-10-CM

## 2020-01-02 RX ORDER — PEN NEEDLE, DIABETIC 31 GX5/16"
NEEDLE, DISPOSABLE MISCELLANEOUS DAILY
Qty: 100 EACH | Refills: 11 | Status: SHIPPED | OUTPATIENT
Start: 2020-01-02 | End: 2020-11-19 | Stop reason: SDUPTHER

## 2020-01-08 DIAGNOSIS — Z79.4 TYPE 2 DIABETES MELLITUS WITH HYPERGLYCEMIA, WITH LONG-TERM CURRENT USE OF INSULIN (HCC): ICD-10-CM

## 2020-01-08 DIAGNOSIS — E11.65 TYPE 2 DIABETES MELLITUS WITH HYPERGLYCEMIA, WITH LONG-TERM CURRENT USE OF INSULIN (HCC): ICD-10-CM

## 2020-01-10 DIAGNOSIS — I10 ESSENTIAL HYPERTENSION: ICD-10-CM

## 2020-01-13 RX ORDER — LISINOPRIL 20 MG/1
TABLET ORAL
Qty: 30 TABLET | Refills: 1 | Status: SHIPPED | OUTPATIENT
Start: 2020-01-13 | End: 2020-02-27 | Stop reason: ALTCHOICE

## 2020-01-23 DIAGNOSIS — E11.65 TYPE 2 DIABETES MELLITUS WITH HYPERGLYCEMIA, WITH LONG-TERM CURRENT USE OF INSULIN (HCC): ICD-10-CM

## 2020-01-23 DIAGNOSIS — I10 ESSENTIAL HYPERTENSION: ICD-10-CM

## 2020-01-23 DIAGNOSIS — Z79.4 TYPE 2 DIABETES MELLITUS WITH HYPERGLYCEMIA, WITH LONG-TERM CURRENT USE OF INSULIN (HCC): ICD-10-CM

## 2020-01-23 RX ORDER — EMPAGLIFLOZIN 10 MG/1
TABLET, FILM COATED ORAL
Qty: 30 TABLET | Refills: 0 | Status: SHIPPED | OUTPATIENT
Start: 2020-01-23 | End: 2020-07-08 | Stop reason: SDUPTHER

## 2020-01-23 RX ORDER — AMLODIPINE BESYLATE 10 MG/1
TABLET ORAL
Qty: 30 TABLET | Refills: 0 | Status: SHIPPED | OUTPATIENT
Start: 2020-01-23 | End: 2021-01-20 | Stop reason: SDUPTHER

## 2020-02-18 DIAGNOSIS — R06.02 SHORTNESS OF BREATH: ICD-10-CM

## 2020-02-27 ENCOUNTER — OFFICE VISIT (OUTPATIENT)
Dept: FAMILY MEDICINE CLINIC | Facility: CLINIC | Age: 70
End: 2020-02-27

## 2020-02-27 VITALS
TEMPERATURE: 97 F | BODY MASS INDEX: 29.82 KG/M2 | WEIGHT: 190 LBS | SYSTOLIC BLOOD PRESSURE: 170 MMHG | RESPIRATION RATE: 18 BRPM | DIASTOLIC BLOOD PRESSURE: 88 MMHG | OXYGEN SATURATION: 95 % | HEIGHT: 67 IN | HEART RATE: 64 BPM

## 2020-02-27 DIAGNOSIS — Z00.00 WELLNESS EXAMINATION: Primary | ICD-10-CM

## 2020-02-27 DIAGNOSIS — E11.65 TYPE 2 DIABETES MELLITUS WITH HYPERGLYCEMIA, WITH LONG-TERM CURRENT USE OF INSULIN (HCC): ICD-10-CM

## 2020-02-27 DIAGNOSIS — Z13.6 SCREENING FOR AAA (ABDOMINAL AORTIC ANEURYSM): ICD-10-CM

## 2020-02-27 DIAGNOSIS — M54.41 CHRONIC MIDLINE LOW BACK PAIN WITH RIGHT-SIDED SCIATICA: ICD-10-CM

## 2020-02-27 DIAGNOSIS — H91.92 HEARING DEFICIT, LEFT: ICD-10-CM

## 2020-02-27 DIAGNOSIS — Z12.11 ENCOUNTER FOR SCREENING COLONOSCOPY: ICD-10-CM

## 2020-02-27 DIAGNOSIS — I10 ESSENTIAL HYPERTENSION: ICD-10-CM

## 2020-02-27 DIAGNOSIS — G89.29 CHRONIC MIDLINE LOW BACK PAIN WITH RIGHT-SIDED SCIATICA: ICD-10-CM

## 2020-02-27 DIAGNOSIS — Z79.4 TYPE 2 DIABETES MELLITUS WITH HYPERGLYCEMIA, WITH LONG-TERM CURRENT USE OF INSULIN (HCC): ICD-10-CM

## 2020-02-27 DIAGNOSIS — Z12.2 ENCOUNTER FOR SCREENING FOR LUNG CANCER: ICD-10-CM

## 2020-02-27 PROCEDURE — 3077F SYST BP >= 140 MM HG: CPT | Performed by: INTERNAL MEDICINE

## 2020-02-27 PROCEDURE — 1170F FXNL STATUS ASSESSED: CPT | Performed by: INTERNAL MEDICINE

## 2020-02-27 PROCEDURE — 3079F DIAST BP 80-89 MM HG: CPT | Performed by: INTERNAL MEDICINE

## 2020-02-27 PROCEDURE — 1036F TOBACCO NON-USER: CPT | Performed by: INTERNAL MEDICINE

## 2020-02-27 PROCEDURE — 4040F PNEUMOC VAC/ADMIN/RCVD: CPT | Performed by: INTERNAL MEDICINE

## 2020-02-27 PROCEDURE — 3008F BODY MASS INDEX DOCD: CPT | Performed by: INTERNAL MEDICINE

## 2020-02-27 PROCEDURE — 1160F RVW MEDS BY RX/DR IN RCRD: CPT | Performed by: INTERNAL MEDICINE

## 2020-02-27 PROCEDURE — 3066F NEPHROPATHY DOC TX: CPT | Performed by: INTERNAL MEDICINE

## 2020-02-27 PROCEDURE — 1125F AMNT PAIN NOTED PAIN PRSNT: CPT | Performed by: INTERNAL MEDICINE

## 2020-02-27 PROCEDURE — G0439 PPPS, SUBSEQ VISIT: HCPCS | Performed by: INTERNAL MEDICINE

## 2020-02-27 NOTE — ASSESSMENT & PLAN NOTE
Patient has a history of a 30 pack year smoking, quit smoking in 2012, less than 15 years ago  No current symptoms  Order low-dose CT scan

## 2020-02-27 NOTE — ASSESSMENT & PLAN NOTE
Up to date with vaccinations, Shingrix sent, refused tdap  Colonoscopy was about 12 years ago as per patient, refer the patient for colonoscopy  AAA US ordered   CT lung cancer screening ordered   Advised patient about the importance of diet and exercise   Advised the patient about the importance of having at least yearly eye and teeth examination  Previous history of smoking  No alcohol abuse

## 2020-02-27 NOTE — ASSESSMENT & PLAN NOTE
Not control, blood pressure is 170/88  Given the patient's current age and risk factors, blood pressure goal of less than 140/90  Again the patient is not aware of what medications he is currently taking, did urge patient to call our office to report his current medication list and will consider adjusting once a medication list is available

## 2020-02-27 NOTE — ASSESSMENT & PLAN NOTE
Lab Results   Component Value Date    HGBA1C 10 7 (A) 11/25/2019     Will order hemoglobin A1c with his laboratory workup  Patient is unaware of any of his medications  Patient was recently evaluated by Endocrinology but he decided not to follow up with Endocrinology  Urged the patient about the importance of calling our office to update his medication list  Will adjust medications once hemoglobin A1c results and full list of his medications are available  Patient had an eye exam about 6 months ago  Blood pressure not at goal  Continue with aspirin statin

## 2020-02-27 NOTE — PATIENT INSTRUCTIONS

## 2020-02-27 NOTE — PROGRESS NOTES
Assessment and Plan:     Problem List Items Addressed This Visit        Endocrine    Type 2 diabetes mellitus with hyperglycemia, with long-term current use of insulin ([facility] [address] )       Lab Results   Component Value Date    HGBA1C 10 7 (A) 11/25/2019     Will order hemoglobin A1c with his laboratory workup  Patient is unaware of any of his medications  Patient was recently evaluated by Endocrinology but he decided not to follow up with Endocrinology  Urged the patient about the importance of calling our office to update his medication list  Will adjust medications once hemoglobin A1c results and full list of his medications are available  Patient had an eye exam about 6 months ago  Blood pressure not at goal  Continue with aspirin statin         Relevant Orders    Basic metabolic panel    CBC and differential    Microalbumin / creatinine urine ratio    Lipid Panel with Direct LDL reflex    HEMOGLOBIN A1C W/ EAG ESTIMATION       Cardiovascular and Mediastinum    Essential hypertension     Not control, blood pressure is 170/88  Given the patient's current age and risk factors, blood pressure goal of less than 140/90  Again the patient is not aware of what medications he is currently taking, did urge patient to call our office to report his current medication list and will consider adjusting once a medication list is available            Nervous and Auditory    Chronic midline low back pain with right-sided sciatica     Will refer the patient to the Comprehensive Spine program         Relevant Orders    Ambulatory Referral to Comprehensive Spine Program    Hearing deficit, left     Will refer to ENT for evaluation of possible hearing aid          Relevant Orders    Ambulatory Referral to Otolaryngology       Other    Wellness examination - Primary     Up to date with vaccinations, Shingrix sent, refused tdap  Colonoscopy was about 12 years ago as per patient, refer the patient for colonoscopy  AAA US ordered   CT lung cancer screening ordered   Advised patient about the importance of diet and exercise   Advised the patient about the importance of having at least yearly eye and teeth examination  Previous history of smoking  No alcohol abuse         Relevant Medications    Zoster Vac Recomb Adjuvanted (Shingrix) 50 MCG/0 5ML SUSR    Other Relevant Orders    Hepatitis panel, acute    CBC and differential    Microalbumin / creatinine urine ratio    Lipid Panel with Direct LDL reflex    Screening for AAA (abdominal aortic aneurysm)     Previous history of smoking         Relevant Orders    US abdominal aorta screening aaa    Encounter for screening colonoscopy    Relevant Orders    Ambulatory referral to General Surgery    Encounter for screening for lung cancer     Patient has a history of a 30 pack year smoking, quit smoking in 2012, less than 15 years ago  No current symptoms  Order low-dose CT scan         Relevant Orders    CT lung screening program        BMI Counseling: Body mass index is 29 76 kg/m²  The BMI is above normal  Exercise recommendations include exercising 3-5 times per week  No pharmacotherapy was ordered  Preventive health issues were discussed with patient, and age appropriate screening tests were ordered as noted in patient's After Visit Summary  Personalized health advice and appropriate referrals for health education or preventive services given if needed, as noted in patient's After Visit Summary  History of Present Illness:     Patient [facility] for [facility] Medicare visit  Patient Care Team:  Jennifer Stone MD as PCP - General (Family Medicine)     Review of Systems:     Review of Systems   Constitutional: Negative for activity change, appetite change, chills, fatigue and fever  HENT: Negative for trouble swallowing  Eyes: Negative for visual disturbance  Respiratory: Negative for chest tightness and shortness of breath      Cardiovascular: Negative for chest pain, palpitations and leg swelling  Gastrointestinal: Negative for abdominal distention, abdominal pain, blood in stool, constipation, diarrhea, nausea and vomiting  Endocrine: Negative for polydipsia and polyuria  Genitourinary: Negative for dysuria  Musculoskeletal: Positive for back pain  Skin: Negative for rash  Neurological: Positive for numbness  Negative for weakness  Hematological: Negative for adenopathy  Problem List:     Patient Active Problem List   Diagnosis    Mixed hyperlipidemia    Gastroesophageal reflux disease    Type 2 diabetes mellitus with hyperglycemia, with long-term current use of insulin ([facility] )    Essential hypertension    Right lower quadrant abdominal pain    Chronic midline low back pain with right-sided sciatica    Benign non-nodular prostatic hyperplasia without lower urinary tract symptoms    Left varicocele    Microhematuria    CKD (chronic kidney disease) stage 2, GFR 60-89 ml/min    Wellness examination    Screening for AAA (abdominal aortic aneurysm)    Encounter for screening colonoscopy    Encounter for screening for lung cancer    Hearing deficit, left      Past Medical and Surgical History:     Past Medical History:   Diagnosis Date    Diabetes mellitus ([facility] [address] )      Past Surgical History:   Procedure Laterality Date    APPENDECTOMY Right 1970    PARAMEDIAN INCISION      Family History:     History reviewed  No pertinent family history     Social History:        Social History     Socioeconomic History    Marital status: /Civil Union     Spouse name: None    Number of children: None    Years of education: None    Highest education level: None   Occupational History    None   Social Needs    Financial resource strain: None    Food insecurity:     Worry: None     Inability: None    Transportation needs:     Medical: None     Non-medical: None   Tobacco Use    Smoking status: Former Smoker     Types: Cigarettes    Smokeless tobacco: Former User    Tobacco comment: Quit 2001   Substance and Sexual Activity    Alcohol use: Never     Frequency: Never    Drug use: Never    Sexual activity: None   Lifestyle    Physical activity:     Days per week: None     Minutes per session: None    Stress: None   Relationships    Social connections:     Talks on phone: None     Gets together: None     Attends Yazidi service: None     Active member of club or organization: None     Attends meetings of clubs or organizations: None     Relationship status: None    Intimate partner violence:     Fear of current or ex partner: None     Emotionally abused: None     Physically abused: None     Forced sexual activity: None   Other Topics Concern    None   Social History Narrative    None      Medications and Allergies:     Current Outpatient Medications   Medication Sig Dispense Refill    Albuterol Sulfate 108 (90 Base) MCG/ACT AEPB Inhale 1 puff every 6 (six) hours as needed (shortness of breath) 1 each 3    Alcohol Swabs (ALCOHOL PREP) PADS by Does not apply route daily 100 each 11    amLODIPine (NORVASC) 10 mg tablet TAKE ONE TABLET BY MOUTH ONCE DAILY 30 tablet 0    aspirin (ECOTRIN LOW STRENGTH) 81 mg EC tablet Take 1 tablet (81 mg total) by mouth daily 30 tablet 11    atorvastatin (LIPITOR) 40 mg tablet TAKE ONE TABLET BY MOUTH ONCE DAILY 30 tablet 0    glucose blood (FREESTYLE LITE) test strip 1 each by Other route 4 (four) times a day 100 each 11    Insulin Pen Needle (PEN NEEDLES) 31G X 8 MM MISC use as directed      JARDIANCE 10 MG TABS TAKE ONE TABLET BY MOUTH ONCE DAILY 30 tablet 0    metFORMIN (GLUCOPHAGE) 1000 MG tablet TAKE ONE TABLET BY MOUTH TWICE A DAY WITH MEALS 60 tablet 0    pantoprazole (PROTONIX) 40 mg tablet Take 1 tablet (40 mg total) by mouth daily 30 tablet 5    SURE COMFORT LANCETS 30G MISC use as directed      insulin glargine (LANTUS SOLOSTAR) 100 units/mL injection pen Inject 50 Units under the skin every 12 (twelve) hours 10 pen 0  Zoster Vac Recomb Adjuvanted (Shingrix) 50 MCG/0 5ML SUSR Inject 0 5 mL into a muscle once for 1 dose Repeat dose in 2 to 6 months 1 each 1     No current facility-administered medications for this visit  Allergies   Allergen Reactions    Iodinated Diagnostic Agents Other (See Comments)      Immunizations:     Immunization History   Administered Date(s) Administered    INFLUENZA 10/23/2008, 10/12/2009, 11/12/2010, 11/02/2011, 09/19/2012, 11/15/2013, 10/29/2014, 11/02/2015, 10/20/2016    Influenza, high dose seasonal 0 5 mL 02/04/2019, 11/25/2019    Pneumococcal Conjugate 13-Valent 10/20/2016    Pneumococcal Polysaccharide PPV23 02/20/2009    Tdap 02/20/2009      Health Maintenance:         Topic Date Due    Hepatitis C Screening  1950    CRC Screening: Colonoscopy  1950         Topic Date Due    DTaP,Tdap,and Td Vaccines (2 - Td) 02/20/2019      Medicare Screening Tests and Risk Assessments:     Nely Greer is here for his Subsequent Wellness visit  Health Risk Assessment:   Patient rates overall health as good  Patient feels that their physical health rating is slightly worse  Eyesight was rated as same  Hearing was rated as slightly worse  Patient feels that their emotional and mental health rating is same  Pain experienced in the last 7 days has been some  Patient's pain rating has been 6/10  Patient states that he has experienced no weight loss or gain in last 6 months  Depression Screening:   PHQ-2 Score: 0      Fall Risk Screening: In the past year, patient has experienced: no history of falling in past year      Home Safety:  Patient has trouble with stairs inside or outside of their home  Patient has working smoke alarms and has working carbon monoxide detector  Home safety hazards include: none  Nutrition:   Current diet is Regular  Medications:   Patient is not currently taking any over-the-counter supplements  Patient is able to manage medications  Activities of Daily Living (ADLs)/Instrumental Activities of Daily Living (IADLs):   Walk and transfer into and out of bed and chair?: Yes  Dress and groom yourself?: Yes    Bathe or shower yourself?: Yes    Feed yourself? Yes  Do your laundry/housekeeping?: Yes  Manage your money, pay your bills and track your expenses?: Yes  Make your own meals?: Yes    Do your own shopping?: Yes    Previous Hospitalizations:   Any hospitalizations or ED visits within the last 12 months?: No      Advance Care Planning:   Living will: No    Five wishes given: Yes      Cognitive Screening:   Provider or family/friend/caregiver concerned regarding cognition?: No    PREVENTIVE SCREENINGS      Cardiovascular Screening:    General: Screening Not Indicated and History Lipid Disorder      Diabetes Screening:     General: Screening Not Indicated and History Diabetes      Colorectal Cancer Screening:     General: Risks and Benefits Discussed    Due for: Colonoscopy - Low Risk      Prostate Cancer Screening:    General: Screening Not Indicated      Osteoporosis Screening:    General: Screening Not Indicated      Abdominal Aortic Aneurysm (AAA) Screening:    Risk factors include: age between 73-69 yo and tobacco use        Lung Cancer Screening:     General: Risks and Benefits Discussed    Due for: Low Dose CT (LDCT)      Hepatitis C Screening:    General: Screening Not Indicated and Risks and Benefits Discussed    Hep C Screening Accepted: No     Other Counseling Topics:   Alcohol use counseling and regular weightbearing exercise  No exam data present     Physical Exam:     /88 (BP Location: Right arm, Patient Position: Sitting, Cuff Size: Large)   Pulse 64   Temp (!) 97 °F (36 1 °C) (Temporal)   Resp 18   Ht 5' 7" (1 702 m)   Wt 86 2 kg (190 lb)   SpO2 95%   BMI 29 76 kg/m²       Physical Exam   Constitutional: He is oriented to person, place, and time  He appears well-developed and well-nourished  No distress  HENT:   Head: Normocephalic and atraumatic  Right Ear: External ear normal    Left Ear: External ear normal    Nose: Nose normal    Mouth/Throat: Oropharynx is clear and moist  No oropharyngeal exudate  Eyes: Pupils are equal, round, and reactive to light  Conjunctivae are normal    Neck: Normal range of motion  Neck supple  Cardiovascular: Normal rate, regular rhythm and normal heart sounds  Exam reveals no gallop and no friction rub  No murmur heard  Pulmonary/Chest: Effort normal and breath sounds normal  No respiratory distress  He has no wheezes  He has no rales  Abdominal: Soft  Bowel sounds are normal  He exhibits no distension  There is no tenderness  Musculoskeletal: Normal range of motion  He exhibits no edema  Neurological: He is alert and oriented to person, place, and time  Skin: Skin is warm and dry  No rash noted  He is not diaphoretic  No erythema  Vitals reviewed        Gretchen Carvajal MD

## 2020-02-28 ENCOUNTER — TELEPHONE (OUTPATIENT)
Dept: PHYSICAL THERAPY | Facility: OTHER | Age: 70
End: 2020-02-28

## 2020-02-28 NOTE — TELEPHONE ENCOUNTER
was used # [id number]  Message left for Pt to call us back  Our ph # and hours of business provided  Waiting for return call from Pt      This was our first attempt at calling this Pt

## 2020-03-03 ENCOUNTER — TELEPHONE (OUTPATIENT)
Dept: FAMILY MEDICINE CLINIC | Facility: CLINIC | Age: 70
End: 2020-03-03

## 2020-03-03 NOTE — TELEPHONE ENCOUNTER
MONET and orders mailed  US AAA apt is on 3/18/20 at 9 am at [address]  Pt to not eat or drink after midnight    CT low dose lung apt is on 3/18/20 at 10 am at   [address], [place]

## 2020-03-07 ENCOUNTER — APPOINTMENT (OUTPATIENT)
Dept: LAB | Facility: HOSPITAL | Age: 70
End: 2020-03-07
Payer: COMMERCIAL

## 2020-03-07 DIAGNOSIS — Z00.00 WELLNESS EXAMINATION: ICD-10-CM

## 2020-03-07 DIAGNOSIS — E11.65 TYPE 2 DIABETES MELLITUS WITH HYPERGLYCEMIA, WITH LONG-TERM CURRENT USE OF INSULIN (HCC): ICD-10-CM

## 2020-03-07 DIAGNOSIS — Z79.4 TYPE 2 DIABETES MELLITUS WITH HYPERGLYCEMIA, WITH LONG-TERM CURRENT USE OF INSULIN (HCC): ICD-10-CM

## 2020-03-07 LAB
ANION GAP SERPL CALCULATED.3IONS-SCNC: 9 MMOL/L (ref 5–14)
BASOPHILS # BLD AUTO: 0.1 THOUSANDS/ΜL (ref 0–0.1)
BASOPHILS NFR BLD AUTO: 1 % (ref 0–1)
BUN SERPL-MCNC: 16 MG/DL (ref 5–25)
CALCIUM SERPL-MCNC: 9.9 MG/DL (ref 8.4–10.2)
CHLORIDE SERPL-SCNC: 99 MMOL/L (ref 97–108)
CHOLEST SERPL-MCNC: 270 MG/DL
CO2 SERPL-SCNC: 32 MMOL/L (ref 22–30)
CREAT SERPL-MCNC: 1.09 MG/DL (ref 0.7–1.5)
CREAT UR-MCNC: 240 MG/DL
EOSINOPHIL # BLD AUTO: 0.1 THOUSAND/ΜL (ref 0–0.4)
EOSINOPHIL NFR BLD AUTO: 3 % (ref 0–6)
ERYTHROCYTE [DISTWIDTH] IN BLOOD BY AUTOMATED COUNT: 15.3 %
EST. AVERAGE GLUCOSE BLD GHB EST-MCNC: 243 MG/DL
GFR SERPL CREATININE-BSD FRML MDRD: 68 ML/MIN/1.73SQ M
GLUCOSE P FAST SERPL-MCNC: 223 MG/DL (ref 70–99)
HAV IGM SER QL: NORMAL
HBA1C MFR BLD: 10.1 %
HBV CORE IGM SER QL: NORMAL
HBV SURFACE AG SER QL: NORMAL
HCT VFR BLD AUTO: 41.9 % (ref 41–53)
HCV AB SER QL: NORMAL
HDLC SERPL-MCNC: 40 MG/DL
HGB BLD-MCNC: 13.6 G/DL (ref 13.5–17.5)
LDLC SERPL CALC-MCNC: 202 MG/DL
LYMPHOCYTES # BLD AUTO: 2.9 THOUSANDS/ΜL (ref 0.5–4)
LYMPHOCYTES NFR BLD AUTO: 50 % (ref 25–45)
MCH RBC QN AUTO: 27.9 PG (ref 26–34)
MCHC RBC AUTO-ENTMCNC: 32.6 G/DL (ref 31–36)
MCV RBC AUTO: 86 FL (ref 80–100)
MICROALBUMIN UR-MCNC: 554 MG/L (ref 0–20)
MICROALBUMIN/CREAT 24H UR: 231 MG/G CREATININE (ref 0–30)
MONOCYTES # BLD AUTO: 0.6 THOUSAND/ΜL (ref 0.2–0.9)
MONOCYTES NFR BLD AUTO: 10 % (ref 1–10)
NEUTROPHILS # BLD AUTO: 2.1 THOUSANDS/ΜL (ref 1.8–7.8)
NEUTS SEG NFR BLD AUTO: 37 % (ref 45–65)
PLATELET # BLD AUTO: 280 THOUSANDS/UL (ref 150–450)
PMV BLD AUTO: 8.8 FL (ref 8.9–12.7)
POTASSIUM SERPL-SCNC: 4 MMOL/L (ref 3.6–5)
RBC # BLD AUTO: 4.89 MILLION/UL (ref 4.5–5.9)
SODIUM SERPL-SCNC: 140 MMOL/L (ref 137–147)
TRIGL SERPL-MCNC: 142 MG/DL
WBC # BLD AUTO: 5.8 THOUSAND/UL (ref 4.5–11)

## 2020-03-07 PROCEDURE — 3046F HEMOGLOBIN A1C LEVEL >9.0%: CPT | Performed by: INTERNAL MEDICINE

## 2020-03-07 PROCEDURE — 80074 ACUTE HEPATITIS PANEL: CPT

## 2020-03-07 PROCEDURE — 85025 COMPLETE CBC W/AUTO DIFF WBC: CPT

## 2020-03-07 PROCEDURE — 82043 UR ALBUMIN QUANTITATIVE: CPT

## 2020-03-07 PROCEDURE — 83036 HEMOGLOBIN GLYCOSYLATED A1C: CPT

## 2020-03-07 PROCEDURE — 3060F POS MICROALBUMINURIA REV: CPT | Performed by: INTERNAL MEDICINE

## 2020-03-07 PROCEDURE — 80061 LIPID PANEL: CPT

## 2020-03-07 PROCEDURE — 36415 COLL VENOUS BLD VENIPUNCTURE: CPT

## 2020-03-07 PROCEDURE — 80048 BASIC METABOLIC PNL TOTAL CA: CPT

## 2020-03-07 PROCEDURE — 82570 ASSAY OF URINE CREATININE: CPT

## 2020-03-18 ENCOUNTER — TELEPHONE (OUTPATIENT)
Dept: FAMILY MEDICINE CLINIC | Facility: CLINIC | Age: 70
End: 2020-03-18

## 2020-03-18 ENCOUNTER — HOSPITAL ENCOUNTER (OUTPATIENT)
Dept: ULTRASOUND IMAGING | Facility: HOSPITAL | Age: 70
Discharge: HOME | End: 2020-03-18
Payer: COMMERCIAL

## 2020-03-18 ENCOUNTER — HOSPITAL ENCOUNTER (OUTPATIENT)
Dept: CT IMAGING | Facility: HOSPITAL | Age: 70
Discharge: HOME | End: 2020-03-18
Payer: COMMERCIAL

## 2020-03-18 DIAGNOSIS — Z12.2 SCREENING FOR MALIGNANT NEOPLASM OF RESPIRATORY ORGAN: ICD-10-CM

## 2020-03-18 DIAGNOSIS — Z12.2 ENCOUNTER FOR SCREENING FOR LUNG CANCER: ICD-10-CM

## 2020-03-18 DIAGNOSIS — Z87.891 PERSONAL HISTORY OF TOBACCO USE, PRESENTING HAZARDS TO HEALTH: ICD-10-CM

## 2020-03-18 DIAGNOSIS — R06.02 SHORTNESS OF BREATH: ICD-10-CM

## 2020-03-18 DIAGNOSIS — Z13.6 SCREENING FOR AAA (ABDOMINAL AORTIC ANEURYSM): ICD-10-CM

## 2020-03-18 PROCEDURE — 76706 US ABDL AORTA SCREEN AAA: CPT

## 2020-03-18 PROCEDURE — G0297 LDCT FOR LUNG CA SCREEN: HCPCS

## 2020-03-18 NOTE — TELEPHONE ENCOUNTER
PT IS REQUESTING MEDICATION REFILLS ON;          Albuterol Sulfate 108 (90 Base) MCG/ACT AEPB    MECLIZINE HCL 25 MG

## 2020-03-24 NOTE — TELEPHONE ENCOUNTER
Pt states he cannot afford to get med over the counter and if there is another option for him   Thank you

## 2020-03-26 NOTE — TELEPHONE ENCOUNTER
Unfortunately, I have attempted to prescribed before for the patient and was denied as has became an over-the-counter medication

## 2020-05-28 ENCOUNTER — TELEPHONE (OUTPATIENT)
Dept: FAMILY MEDICINE CLINIC | Facility: CLINIC | Age: 70
End: 2020-05-28

## 2020-06-10 DIAGNOSIS — E11.65 TYPE 2 DIABETES MELLITUS WITH HYPERGLYCEMIA, WITH LONG-TERM CURRENT USE OF INSULIN (HCC): ICD-10-CM

## 2020-06-10 DIAGNOSIS — Z79.4 TYPE 2 DIABETES MELLITUS WITH HYPERGLYCEMIA, WITH LONG-TERM CURRENT USE OF INSULIN (HCC): ICD-10-CM

## 2020-06-17 ENCOUNTER — TELEPHONE (OUTPATIENT)
Dept: OTOLARYNGOLOGY | Facility: CLINIC | Age: 70
End: 2020-06-17

## 2020-07-07 NOTE — ASSESSMENT & PLAN NOTE
Per previous visit, BP not at goal  BP goal <140/90  F/u with endo on 06/12 and was started on Amlodipine 10 mg QHS     Continue Losartan 100 mg QHS   Low salt diet   Instructed to check BP at home couple times/week

## 2020-07-07 NOTE — ASSESSMENT & PLAN NOTE
Most recent lipid panel (03/07): , , , HDL 40   Continue Atorvastatin 80mg qhs  Was recently initiated by irma on Ezetimibe 10mg daily  Low fat diet and exercise regimen

## 2020-07-07 NOTE — ASSESSMENT & PLAN NOTE
Lab Results   Component Value Date    HGBA1C 10 1 (H) 03/07/2020     Uncontrolled, HbA1c goal 7  Has upcoming appt with Dr Blanca Hernández next week, will hold off on ordering HbA1c as it will most likely be checked at his visit   F/u with Dr Blanca Hernández (most recent visit on 06/12) with new management as follows: initiated on Novolog Mix 70/30 60u sc BID with meals, Trulicity changed to 1 49IO SC weekly, Jardiance 25mg QAM continued   Lantus was stopped   Schedule appointment with podiatry and ophthalmology  Low carb diet  Consider diabetic education class

## 2020-07-08 ENCOUNTER — TELEMEDICINE (OUTPATIENT)
Dept: FAMILY MEDICINE CLINIC | Facility: CLINIC | Age: 70
End: 2020-07-08

## 2020-07-08 DIAGNOSIS — E78.2 MIXED HYPERLIPIDEMIA: ICD-10-CM

## 2020-07-08 DIAGNOSIS — Z12.11 ENCOUNTER FOR SCREENING COLONOSCOPY: ICD-10-CM

## 2020-07-08 DIAGNOSIS — E11.65 TYPE 2 DIABETES MELLITUS WITH HYPERGLYCEMIA, WITH LONG-TERM CURRENT USE OF INSULIN (HCC): Primary | ICD-10-CM

## 2020-07-08 DIAGNOSIS — Z79.4 TYPE 2 DIABETES MELLITUS WITH HYPERGLYCEMIA, WITH LONG-TERM CURRENT USE OF INSULIN (HCC): Primary | ICD-10-CM

## 2020-07-08 DIAGNOSIS — E66.3 OVERWEIGHT WITH BODY MASS INDEX (BMI) OF 29 TO 29.9 IN ADULT: ICD-10-CM

## 2020-07-08 DIAGNOSIS — I10 ESSENTIAL HYPERTENSION: ICD-10-CM

## 2020-07-08 PROCEDURE — G2012 BRIEF CHECK IN BY MD/QHP: HCPCS | Performed by: FAMILY MEDICINE

## 2020-07-08 RX ORDER — ATORVASTATIN CALCIUM 80 MG/1
80 TABLET, FILM COATED ORAL
COMMUNITY
Start: 2020-06-12 | End: 2021-06-12

## 2020-07-08 RX ORDER — BLOOD-GLUCOSE METER
EACH MISCELLANEOUS
COMMUNITY
Start: 2020-06-12

## 2020-07-08 RX ORDER — LOSARTAN POTASSIUM 100 MG/1
100 TABLET ORAL DAILY
COMMUNITY
Start: 2020-07-06 | End: 2021-01-20 | Stop reason: SDUPTHER

## 2020-07-08 RX ORDER — SITAGLIPTIN 25 MG/1
25 TABLET, FILM COATED ORAL DAILY
COMMUNITY
Start: 2020-04-01

## 2020-07-08 RX ORDER — EZETIMIBE 10 MG/1
10 TABLET ORAL DAILY
COMMUNITY
Start: 2020-06-12 | End: 2021-06-12

## 2020-07-08 NOTE — ASSESSMENT & PLAN NOTE
BMI Counseling: There is no height or weight on file to calculate BMI  The BMI is above normal  Nutrition recommendations include reducing portion sizes, decreasing overall calorie intake, 3-5 servings of fruits/vegetables daily, reducing fast food intake, consuming healthier snacks, decreasing soda and/or juice intake, moderation in carbohydrate intake and increasing intake of lean protein  Exercise recommendations include exercising 3-5 times per week       F/u in 3 months

## 2020-07-08 NOTE — PROGRESS NOTES
Virtual Brief Visit    Assessment/Plan:    Problem List Items Addressed This Visit        Endocrine    Type 2 diabetes mellitus with hyperglycemia, with long-term current use of insulin ([address] ) - Primary       Lab Results   Component Value Date    HGBA1C 10 1 (H) 03/07/2020     Uncontrolled, HbA1c goal 7  Has upcoming appt with Dr Ronnie Keller next week, will hold off on ordering HbA1c as it will most likely be checked at his visit   F/u with Dr Ronnie Keller (most recent visit on 06/12) with new management as follows: initiated on Novolog Mix 70/30 60u sc BID with meals, Trulicity changed to 3 75GM SC weekly, Jardiance 25mg QAM continued  Lantus was stopped   Schedule appointment with podiatry and ophthalmology  Low carb diet  Consider diabetic education class            Relevant Medications    JANUVIA 25 MG tablet    Dulaglutide (Trulicity) 1 18 PS/8 1MI SOPN    insulin aspart protamine-insulin aspart (NovoLOG Mix 70/30 FlexPen) 100 Units/mL injection pen       Cardiovascular and Mediastinum    Essential hypertension     Per previous visit, BP not at goal  BP goal <140/90  F/u with endo on 06/12 and was started on Amlodipine 10 mg QHS  Continue Losartan 100 mg QHS   Low salt diet   Instructed to check BP at home couple times/week               Relevant Medications    losartan (COZAAR) 100 MG tablet       Other    Mixed hyperlipidemia     Most recent lipid panel (03/07): , , , HDL 40   Continue Atorvastatin 80mg qhs  Was recently initiated by endo on Ezetimibe 10mg daily  Low fat diet and exercise regimen          Relevant Medications    ezetimibe (ZETIA) 10 mg tablet    atorvastatin (LIPITOR) 80 mg tablet    Encounter for screening colonoscopy     Has not f/u for colonoscopy  Referral still active, patient encouraged to complete colonoscopy          Overweight with body mass index (BMI) of 29 to 29 9 in adult     BMI Counseling: There is no height or weight on file to calculate BMI   The BMI is above normal  Nutrition recommendations include reducing portion sizes, decreasing overall calorie intake, 3-5 servings of fruits/vegetables daily, reducing fast food intake, consuming healthier snacks, decreasing soda and/or juice intake, moderation in carbohydrate intake and increasing intake of lean protein  Exercise recommendations include exercising 3-5 times per week  F/u in 3 months                      Reason for visit is   Chief Complaint   Patient presents with    Virtual Brief Visit        Encounter provider Lynda Hall MD    [facility] [address] [address] [phone]    Recent Visits  No visits were found meeting these conditions  Showing recent visits within past 7 days and meeting all other requirements     Today's Visits  Date Type Provider Dept   07/08/20 Telemedicine Lynda Hall MD  [facility]   Showing today's visits and meeting all other requirements     Future Appointments  No visits were found meeting these conditions  Showing future appointments within next 150 days and meeting all other requirements        After connecting through [employer] and patient was informed that this is a secure, HIPAA-compliant platform  He agrees to proceed  , the patient was identified by name and date of birth  Tucker Poole was informed that this is a telemedicine visit and that the visit is being conducted through [employer] and patient was informed that this is a secure, HIPAA-compliant platform  He agrees to proceed     My office door was closed  No one else was in the room  He acknowledged consent and understanding of privacy and security of the platform  The patient has agreed to participate and understands he can discontinue the visit at any time  Patient is aware this is a billable service       Subjective    Tucker Poole is a 68 y o  male who presents today for telephone f/u visit of his chronic medical conditions  HPI     Patient states he is overall doing well  Endorses no immediate concerns  F/u with endocrinology, Dr Rohit Nielsen, for management of his diabetes  States he is suppose to have an appointment with endocrinology next week  Checks BG at home between 150 (highest) and 89 (lowest)  Denies any hypoglycemic events  Compliant with taking his medications as directed  Adds he has BP machine at home, however he doesn't check BP  Asked if he can check BP during the virtual encounter, patient stated he cannot recall where he placed it  Last ophthalmology appointment in September, hasn't followed up with podiatry  States she hasn't completed the colonoscopy       Past Medical History:   Diagnosis Date    Diabetes mellitus ([facility] [address] )        Past Surgical History:   Procedure Laterality Date    APPENDECTOMY Right 1970    PARAMEDIAN INCISION       Current Outpatient Medications   Medication Sig Dispense Refill    atorvastatin (LIPITOR) 80 mg tablet Take 80 mg by mouth daily with dinner      Dulaglutide (Trulicity) 4 61 UG/9 3KH SOPN Inject 0 75 mg under the skin once a week      ezetimibe (ZETIA) 10 mg tablet Take 10 mg by mouth daily      insulin aspart protamine-insulin aspart (NovoLOG Mix 70/30 FlexPen) 100 Units/mL injection pen Inject 60 Units under the skin 2 (two) times a day before meals      losartan (COZAAR) 100 MG tablet Take 100 mg by mouth daily      Albuterol Sulfate 108 (90 Base) MCG/ACT AEPB Inhale 1 puff every 6 (six) hours as needed (shortness of breath) 1 each 3    Alcohol Swabs (ALCOHOL PREP) PADS by Does not apply route daily 100 each 11    amLODIPine (NORVASC) 10 mg tablet TAKE ONE TABLET BY MOUTH ONCE DAILY 30 tablet 0    aspirin (ECOTRIN LOW STRENGTH) 81 mg EC tablet Take 1 tablet (81 mg total) by mouth daily 30 tablet 11    EASY COMFORT PEN NEEDLES 32G X 4 MM MISC       glucose blood (FREESTYLE LITE) test strip 1 each by Other route 4 (four) times a day 100 each 11    JANUVIA 25 MG tablet Take 25 mg by mouth daily Take 1 tablet (25 mg) per mouth once daily      metFORMIN (GLUCOPHAGE) 1000 MG tablet TAKE ONE TABLET BY MOUTH TWICE A DAY WITH MEALS 60 tablet 0    pantoprazole (PROTONIX) 40 mg tablet Take 1 tablet (40 mg total) by mouth daily 30 tablet 5    SURE COMFORT LANCETS 30G MISC use as directed       No current facility-administered medications for this visit  Allergies   Allergen Reactions    Iodinated Diagnostic Agents Other (See Comments)       Review of Systems   Constitutional: Negative for chills and fever  HENT: Negative for sore throat  Eyes: Negative for visual disturbance  Respiratory: Negative  Negative for cough and shortness of breath  Cardiovascular: Negative for chest pain and leg swelling  Gastrointestinal: Negative  Negative for abdominal pain, blood in stool, constipation, diarrhea, nausea and vomiting  Genitourinary: Negative  Musculoskeletal: Positive for back pain  Chronic    Skin: Negative for rash  Neurological: Negative for dizziness and headaches  Psychiatric/Behavioral: The patient is not nervous/anxious  Telephone Exam     Erin Han appears alert, cooperative and in no apparent distress  It was my intent to perform this visit via video technology but the patient was not able to do a video connection so the visit was completed via audio telephone only  There were no vitals filed for this visit  I spent 20 minutes directly with the patient during this visit    VIRTUAL VISIT DISCLAIMER    Keith Vance acknowledges that he has consented to an online visit or consultation  He understands that the online visit is based solely on information provided by him, and that, in the absence of a face-to-face physical evaluation by the physician, the diagnosis he receives is both limited and provisional in terms of accuracy and completeness   This is not intended to replace a full medical face-to-face evaluation by the physician  Lalo Parra understands and accepts these terms

## 2020-07-08 NOTE — PATIENT INSTRUCTIONS
Lifestyle Medicine Tip Sheet- Sudanese    1  Coma alimentos predominantemente menos procesados, makayla comida rápida, cenas de televisión y tocino  2  Coma cerca de la naturaleza (mercados de agricultores, productos frescos o congelados)    3  Coma michael dieta predominantemente basada en plantas  a  Verdes frondosos oscuros osmel   b  Frutas y vegetales  c   Granos integrales: dilia integral, apenas, bayas de dilia, quinua, muriel cortada en gaurav, arroz integral, pasta integral   d  Legumbres: frijoles, frijoles pintos, frijoles blancos, frijoles negros, garbanzos (garbanzos), frijoles lima (maduros, secos), arvejas, lentejas y edamame (frijoles de soya verdes)      4  Al menos la mitad del plato debe contener frutas o verduras  5  El líquido debe ser predominantemente agua (limite el refresco y [place])    6  Brittany el tamaño de la porción  7  ¿Qué alimentos rachele evitar o limitar? - Grasas: Específicamente saturadas y grasas trans  Se encuentran en margarinas, muchas comidas rápidas y algunos productos horneados comprados en la suraj  Las grasas saturadas y grasas trans pueden elevar rider nivel de colesterol y rider probabilidad de contraer enfermedades cardíacas  - Cuando cocine, es mejor no usar aceites, evelin si es necesario, trate de limitar la cantidad de aceite utilizado, ya que el aceite contiene muchas calorías por volumen y es muy poco saludable cuando se calienta roma la cocción   - Azúcar: limite o evite el azúcar, los dulces y los granos refinados  Los granos refinados se encuentran en el pan robertson, el arroz robertson, la mayoría de las formas de pasta y la mayoría de los alimentos "aperitivos" envasados  - Intente no cocinar con ottoniel y evite agregar ottoniel adicional a ann comidas  - John Rink: los estudios mcgarry demostrado que comer mucha richar Madsen riesgo de ciertos problemas de [place], makayla enfermedades cardíacas y cáncer  8  7-9 horas de sueño    9   Ejercicio diario mínimo de 30 minutos (caminando alrededor de la [place])    10  Socialización (amigos y familiares)    - Explora tu vecindario  Ve al parque, pasa tiempo en la biblioteca  Si está interesado, puede leer más sobre las opciones de alimentos saludables en los siguientes sitios web:  a  [employer]rition[employer]  org  b  Home cooking recipes: https://www QponDirect/  c  http://francisco info/  d  Familydoctor  org

## 2020-08-06 DIAGNOSIS — Z79.4 TYPE 2 DIABETES MELLITUS WITH HYPERGLYCEMIA, WITH LONG-TERM CURRENT USE OF INSULIN (HCC): ICD-10-CM

## 2020-08-06 DIAGNOSIS — E11.65 TYPE 2 DIABETES MELLITUS WITH HYPERGLYCEMIA, WITH LONG-TERM CURRENT USE OF INSULIN (HCC): ICD-10-CM

## 2020-08-06 DIAGNOSIS — I10 ESSENTIAL HYPERTENSION: ICD-10-CM

## 2020-08-10 RX ORDER — ASPIRIN 81 MG/1
TABLET, COATED ORAL
Qty: 30 TABLET | Refills: 11 | Status: SHIPPED | OUTPATIENT
Start: 2020-08-10

## 2020-08-12 ENCOUNTER — HOSPITAL ENCOUNTER (EMERGENCY)
Facility: HOSPITAL | Age: 70
Discharge: HOME | End: 2020-08-12
Attending: EMERGENCY MEDICINE | Admitting: EMERGENCY MEDICINE
Payer: COMMERCIAL

## 2020-08-12 ENCOUNTER — APPOINTMENT (EMERGENCY)
Dept: RADIOLOGY | Facility: HOSPITAL | Age: 70
End: 2020-08-12
Payer: COMMERCIAL

## 2020-08-12 VITALS
TEMPERATURE: 97.7 F | OXYGEN SATURATION: 98 % | DIASTOLIC BLOOD PRESSURE: 99 MMHG | HEART RATE: 72 BPM | BODY MASS INDEX: 29.83 KG/M2 | SYSTOLIC BLOOD PRESSURE: 167 MMHG | RESPIRATION RATE: 18 BRPM | WEIGHT: 190.48 LBS

## 2020-08-12 DIAGNOSIS — R07.9 CHEST PAIN: Primary | ICD-10-CM

## 2020-08-12 LAB
ALBUMIN SERPL BCP-MCNC: 4 G/DL (ref 3–5.2)
ALP SERPL-CCNC: 93 U/L (ref 43–122)
ALT SERPL W P-5'-P-CCNC: 7 U/L (ref 9–52)
ANION GAP SERPL CALCULATED.3IONS-SCNC: 9 MMOL/L (ref 5–14)
AST SERPL W P-5'-P-CCNC: 26 U/L (ref 17–59)
ATRIAL RATE: 90 BPM
BASOPHILS # BLD AUTO: 0.1 THOUSANDS/ΜL (ref 0–0.1)
BASOPHILS NFR BLD AUTO: 2 % (ref 0–1)
BILIRUB SERPL-MCNC: 0.5 MG/DL
BUN SERPL-MCNC: 13 MG/DL (ref 5–25)
CALCIUM SERPL-MCNC: 9.7 MG/DL (ref 8.4–10.2)
CHLORIDE SERPL-SCNC: 102 MMOL/L (ref 97–108)
CO2 SERPL-SCNC: 25 MMOL/L (ref 22–30)
CREAT SERPL-MCNC: 0.82 MG/DL (ref 0.7–1.5)
EOSINOPHIL # BLD AUTO: 0.2 THOUSAND/ΜL (ref 0–0.4)
EOSINOPHIL NFR BLD AUTO: 2 % (ref 0–6)
ERYTHROCYTE [DISTWIDTH] IN BLOOD BY AUTOMATED COUNT: 14.2 %
GFR SERPL CREATININE-BSD FRML MDRD: 89 ML/MIN/1.73SQ M
GLUCOSE SERPL-MCNC: 286 MG/DL (ref 70–99)
GLUCOSE SERPL-MCNC: 294 MG/DL (ref 65–140)
HCT VFR BLD AUTO: 38.4 % (ref 41–53)
HGB BLD-MCNC: 12.8 G/DL (ref 13.5–17.5)
LIPASE SERPL-CCNC: 33 U/L (ref 23–300)
LYMPHOCYTES # BLD AUTO: 2.7 THOUSANDS/ΜL (ref 0.5–4)
LYMPHOCYTES NFR BLD AUTO: 35 % (ref 25–45)
MCH RBC QN AUTO: 29.2 PG (ref 26–34)
MCHC RBC AUTO-ENTMCNC: 33.2 G/DL (ref 31–36)
MCV RBC AUTO: 88 FL (ref 80–100)
MONOCYTES # BLD AUTO: 0.8 THOUSAND/ΜL (ref 0.2–0.9)
MONOCYTES NFR BLD AUTO: 10 % (ref 1–10)
NEUTROPHILS # BLD AUTO: 4 THOUSANDS/ΜL (ref 1.8–7.8)
NEUTS SEG NFR BLD AUTO: 51 % (ref 45–65)
NT-PROBNP SERPL-MCNC: 34.5 PG/ML (ref 0–299)
P AXIS: 66 DEGREES
PLATELET # BLD AUTO: 268 THOUSANDS/UL (ref 150–450)
PMV BLD AUTO: 8.4 FL (ref 8.9–12.7)
POTASSIUM SERPL-SCNC: 4 MMOL/L (ref 3.6–5)
PR INTERVAL: 162 MS
PROT SERPL-MCNC: 8.7 G/DL (ref 5.9–8.4)
QRS AXIS: 36 DEGREES
QRSD INTERVAL: 76 MS
QT INTERVAL: 368 MS
QTC INTERVAL: 450 MS
RBC # BLD AUTO: 4.38 MILLION/UL (ref 4.5–5.9)
SODIUM SERPL-SCNC: 136 MMOL/L (ref 137–147)
T WAVE AXIS: 58 DEGREES
TROPONIN I SERPL-MCNC: <0.01 NG/ML (ref 0–0.03)
VENTRICULAR RATE: 90 BPM
WBC # BLD AUTO: 7.8 THOUSAND/UL (ref 4.5–11)

## 2020-08-12 PROCEDURE — 84484 ASSAY OF TROPONIN QUANT: CPT | Performed by: EMERGENCY MEDICINE

## 2020-08-12 PROCEDURE — 96360 HYDRATION IV INFUSION INIT: CPT

## 2020-08-12 PROCEDURE — 99285 EMERGENCY DEPT VISIT HI MDM: CPT | Performed by: EMERGENCY MEDICINE

## 2020-08-12 PROCEDURE — 83880 ASSAY OF NATRIURETIC PEPTIDE: CPT | Performed by: EMERGENCY MEDICINE

## 2020-08-12 PROCEDURE — 83690 ASSAY OF LIPASE: CPT | Performed by: EMERGENCY MEDICINE

## 2020-08-12 PROCEDURE — 80053 COMPREHEN METABOLIC PANEL: CPT | Performed by: EMERGENCY MEDICINE

## 2020-08-12 PROCEDURE — 93010 ELECTROCARDIOGRAM REPORT: CPT | Performed by: INTERNAL MEDICINE

## 2020-08-12 PROCEDURE — 36415 COLL VENOUS BLD VENIPUNCTURE: CPT | Performed by: EMERGENCY MEDICINE

## 2020-08-12 PROCEDURE — 82948 REAGENT STRIP/BLOOD GLUCOSE: CPT

## 2020-08-12 PROCEDURE — 71045 X-RAY EXAM CHEST 1 VIEW: CPT

## 2020-08-12 PROCEDURE — 99285 EMERGENCY DEPT VISIT HI MDM: CPT

## 2020-08-12 PROCEDURE — 93005 ELECTROCARDIOGRAM TRACING: CPT

## 2020-08-12 PROCEDURE — 85025 COMPLETE CBC W/AUTO DIFF WBC: CPT | Performed by: EMERGENCY MEDICINE

## 2020-08-12 RX ORDER — ASPIRIN 81 MG/1
324 TABLET, CHEWABLE ORAL ONCE
Status: COMPLETED | OUTPATIENT
Start: 2020-08-12 | End: 2020-08-12

## 2020-08-12 RX ADMIN — ASPIRIN 81 MG 324 MG: 81 TABLET ORAL at 19:17

## 2020-08-12 RX ADMIN — SODIUM CHLORIDE 1000 ML: 0.9 INJECTION, SOLUTION INTRAVENOUS at 19:18

## 2020-08-13 NOTE — ED PROVIDER NOTES
History  Chief Complaint   Patient presents with    Chest Pain     Left sided chest pain which radiates into back for "the past few weeks, it started a few days after I was forcing to use the bathroom"  Pain worse with movement  68-year-old male who comes in complaining of left-sided chest pain that is been present for 3 days  States that is worse with movement, no associated nausea diaphoresis or radiation of pain into his neck back or arms  Patient states he has history of hypertension diabetes and high cholesterol  States he is a smoker but quit almost 20 years ago  Patient denies traumatic injury to his chest, rash or other skin irritation  Patient states he gets relief when he is at rest and worse with trying to bend over  Denies any exertional dyspnea  History provided by:  Patient   used: No    Chest Pain   Associated symptoms: no abdominal pain, no cough, no dizziness, no dysphagia, no fever, no nausea, no numbness, no palpitations, no shortness of breath, not vomiting and no weakness        Prior to Admission Medications   Prescriptions Last Dose Informant Patient Reported? Taking?    Albuterol Sulfate 108 (90 Base) MCG/ACT AEPB   No No   Sig: Inhale 1 puff every 6 (six) hours as needed (shortness of breath)   Alcohol Swabs (ALCOHOL PREP) PADS   No No   Sig: by Does not apply route daily   Aspirin Low Dose 81 MG EC tablet   No No   Sig: TAKE ONE TABLET BY MOUTH DAILY   Dulaglutide (Trulicity) 6 88 PW/5 8JJ SOPN   Yes No   Sig: Inject 0 75 mg under the skin once a week   EASY COMFORT PEN NEEDLES 32G X 4 MM MISC   Yes No   JANUVIA 25 MG tablet   Yes No   Sig: Take 25 mg by mouth daily Take 1 tablet (25 mg) per mouth once daily   SURE COMFORT LANCETS 30G MISC   Yes No   Sig: use as directed   amLODIPine (NORVASC) 10 mg tablet   No No   Sig: TAKE ONE TABLET BY MOUTH ONCE DAILY   atorvastatin (LIPITOR) 80 mg tablet   Yes No   Sig: Take 80 mg by mouth daily with dinner ezetimibe (ZETIA) 10 mg tablet   Yes No   Sig: Take 10 mg by mouth daily   glucose blood (FREESTYLE LITE) test strip   No No   Si each by Other route 4 (four) times a day   insulin aspart protamine-insulin aspart (NovoLOG Mix 70/30 FlexPen) 100 Units/mL injection pen   Yes No   Sig: Inject 60 Units under the skin 2 (two) times a day before meals   losartan (COZAAR) 100 MG tablet   Yes No   Sig: Take 100 mg by mouth daily   metFORMIN (GLUCOPHAGE) 1000 MG tablet   No No   Sig: TAKE ONE TABLET BY MOUTH TWICE A DAY WITH MEALS   pantoprazole (PROTONIX) 40 mg tablet   No No   Sig: Take 1 tablet (40 mg total) by mouth daily      Facility-Administered Medications: None       Past Medical History:   Diagnosis Date    Diabetes mellitus ([facility] [address] )        Past Surgical History:   Procedure Laterality Date    APPENDECTOMY Right 1970    PARAMEDIAN INCISION       History reviewed  No pertinent family history  I have reviewed and agree with the history as documented  E-Cigarette/Vaping     E-Cigarette/Vaping Substances     Social History     Tobacco Use    Smoking status: Former Smoker     Types: Cigarettes    Smokeless tobacco: Former User    Tobacco comment: Quit    Substance Use Topics    Alcohol use: Never     Frequency: Never    Drug use: Never       Review of Systems   Constitutional: Negative  Negative for chills and fever  HENT: Negative  Negative for rhinorrhea, sore throat, trouble swallowing and voice change  Eyes: Negative  Negative for pain and visual disturbance  Respiratory: Negative  Negative for cough, shortness of breath and wheezing  Cardiovascular: Positive for chest pain  Negative for palpitations  Gastrointestinal: Negative for abdominal pain, diarrhea, nausea and vomiting  Genitourinary: Negative  Negative for dysuria and frequency  Musculoskeletal: Negative  Negative for neck pain and neck stiffness  Skin: Negative  Negative for rash  Neurological: Negative  Negative for dizziness, speech difficulty, weakness, light-headedness and numbness  Physical Exam  Physical Exam  Vitals signs and nursing note reviewed  Constitutional:       General: He is not in acute distress  Appearance: He is well-developed  HENT:      Head: Normocephalic and atraumatic  Eyes:      Conjunctiva/sclera: Conjunctivae normal       Pupils: Pupils are equal, round, and reactive to light  Neck:      Musculoskeletal: Normal range of motion and neck supple  Trachea: No tracheal deviation  Cardiovascular:      Rate and Rhythm: Normal rate and regular rhythm  Pulmonary:      Effort: Pulmonary effort is normal  No respiratory distress  Breath sounds: Normal breath sounds  No wheezing or rales  Abdominal:      General: Bowel sounds are normal  There is no distension  Palpations: Abdomen is soft  Tenderness: There is no abdominal tenderness  There is no guarding or rebound  Musculoskeletal: Normal range of motion  General: No tenderness or deformity  Skin:     General: Skin is warm and dry  Capillary Refill: Capillary refill takes less than 2 seconds  Findings: No rash  Neurological:      Mental Status: He is alert and oriented to person, place, and time     Psychiatric:         Behavior: Behavior normal          Vital Signs  ED Triage Vitals [08/12/20 1850]   Temperature Pulse Respirations Blood Pressure SpO2   97 7 °F (36 5 °C) 98 17 (!) 212/100 99 %      Temp Source Heart Rate Source Patient Position - Orthostatic VS BP Location FiO2 (%)   Tympanic Monitor Sitting Left arm --      Pain Score       6           Vitals:    08/12/20 1850 08/12/20 2012   BP: (!) 212/100 167/99   Pulse: 98 72   Patient Position - Orthostatic VS: Sitting Lying         Visual Acuity      ED Medications  Medications   aspirin chewable tablet 324 mg (324 mg Oral Given 8/12/20 1917)   sodium chloride 0 9 % bolus 1,000 mL (0 mL Intravenous Stopped 8/12/20 2027) Diagnostic Studies  Results Reviewed     Procedure Component Value Units Date/Time    Troponin I [[id number]]  (Normal) Collected:  08/12/20 1912    Lab Status:  Final result Specimen:  Blood from Arm, Right Updated:  08/12/20 1947     Troponin I <0 01 ng/mL     Lipase [[id number]]  (Normal) Collected:  08/12/20 1912    Lab Status:  Final result Specimen:  Blood from Arm, Right Updated:  08/12/20 1947     Lipase 33 u/L     NT-BNP PRO [[id number]]  (Normal) Collected:  08/12/20 1912    Lab Status:  Final result Specimen:  Blood from Arm, Right Updated:  08/12/20 1947     NT-proBNP 34 5 pg/mL     Comprehensive metabolic panel [[id number]]  (Abnormal) Collected:  08/12/20 1912    Lab Status:  Final result Specimen:  Blood from Arm, Right Updated:  08/12/20 1947     Sodium 136 mmol/L      Potassium 4 0 mmol/L      Chloride 102 mmol/L      CO2 25 mmol/L      ANION GAP 9 mmol/L      BUN 13 mg/dL      Creatinine 0 82 mg/dL      Glucose 286 mg/dL      Calcium 9 7 mg/dL      AST 26 U/L      ALT 7 U/L      Alkaline Phosphatase 93 U/L      Total Protein 8 7 g/dL      Albumin 4 0 g/dL      Total Bilirubin 0 50 mg/dL      eGFR 89 ml/min/1 73sq m     Narrative:       [place] guidelines for Chronic Kidney Disease (CKD):     Stage 1 with normal or high GFR (GFR > 90 mL/min/1 73 square meters)    Stage 2 Mild CKD (GFR = 60-89 mL/min/1 73 square meters)    Stage 3A Moderate CKD (GFR = 45-59 mL/min/1 73 square meters)    Stage 3B Moderate CKD (GFR = 30-44 mL/min/1 73 square meters)    Stage 4 Severe CKD (GFR = 15-29 mL/min/1 73 square meters)    Stage 5 End Stage CKD (GFR <15 mL/min/1 73 square meters)  Note: GFR calculation is accurate only with a steady state creatinine    CBC and differential [[id number]]  (Abnormal) Collected:  08/12/20 1912    Lab Status:  Final result Specimen:  Blood from Arm, Right Updated:  08/12/20 1922     WBC 7 80 Thousand/uL      RBC 4 38 Million/uL      Hemoglobin 12 8 g/dL      Hematocrit 38 4 %      MCV 88 fL      MCH 29 2 pg      MCHC 33 2 g/dL      RDW 14 2 %      MPV 8 4 fL      Platelets 639 Thousands/uL      Neutrophils Relative 51 %      Lymphocytes Relative 35 %      Monocytes Relative 10 %      Eosinophils Relative 2 %      Basophils Relative 2 %      Neutrophils Absolute 4 00 Thousands/µL      Lymphocytes Absolute 2 70 Thousands/µL      Monocytes Absolute 0 80 Thousand/µL      Eosinophils Absolute 0 20 Thousand/µL      Basophils Absolute 0 10 Thousands/µL     Fingerstick Glucose (POCT) [[id number]]  (Abnormal) Collected:  08/12/20 1919    Lab Status:  Final result Updated:  08/12/20 1920     POC Glucose 294 mg/dl                  XR chest 1 view portable   ED Interpretation by Rosette Barros DO (08/12 2017)   No acute pna or ptx appreciated  Procedures  Procedures         ED Course  ED Course as of Aug 12 2027   Wed Aug 12, 2020   1910 Procedure Note: EKG  Date/Time: 08/12/20 7:10 PM   Performed by: Gutierrez Donald  Authorized by: Gutierrez Donald  ECG interpreted by me, the ED Provider: yes   The EKG demonstrates:  Rate 90  Rhythm sinus  QTc 450  No ST elevations/depressions              US AUDIT      Most Recent Value   Initial Alcohol Screen: US AUDIT-C    1  How often do you have a drink containing alcohol?  0 Filed at: 08/12/2020 1850   2  How many drinks containing alcohol do you have on a typical day you are drinking? 0 Filed at: 08/12/2020 1850   3b  FEMALE Any Age, or MALE 65+: How often do you have 4 or more drinks on one occassion?   0 Filed at: 08/12/2020 1850   Audit-C Score  0 Filed at: 08/12/2020 1850            HEART Risk Score      Most Recent Value   Heart Score Risk Calculator   History  1 Filed at: 08/12/2020 2018   ECG  0 Filed at: 08/12/2020 2018   Age  2 Filed at: 08/12/2020 2018   Risk Factors  2 Filed at: 08/12/2020 2018   Troponin  0 Filed at: 08/12/2020 2018   HEART Score  5 Filed at: 08/12/2020 2018            KIMBERLEE/DAST-10 Most Recent Value   How many times in the past year have you    Used an illegal drug or used a prescription medication for non-medical reasons? Never Filed at: 08/12/2020 1850                                [facility]  Number of Diagnoses or Management Options  Chest pain:   Diagnosis management comments: 17-year-old male presenting for concerns of chest pain and been present for 3 days  Reproducible with movement, not associated with any exertion nausea diaphoresis or or other typical cardiac chest pain symptoms  X-ray blood work and EKG were grossly unremarkable  He does have a moderate heart score of 5, however given his symptoms have been present for 3 days, not associated with physical exertion, suspicion for cardiac source is low  Patient was still advised follow-up primary care doctor and to be evaluated by a cardiologist   Patient is agreeable with plan  Patient was re-evaluated and was chest pain-free at discharge  Amount and/or Complexity of Data Reviewed  Clinical lab tests: ordered and reviewed  Tests in the radiology section of CPT®: ordered and reviewed  Independent visualization of images, tracings, or specimens: yes          Disposition  Final diagnoses:   Chest pain     Time reflects when diagnosis was documented in both MDM as applicable and the Disposition within this note     Time User Action Codes Description Comment    8/12/2020  8:21 PM Christiano Parnell Add [R07 9] Chest pain       ED Disposition     ED Disposition Condition Date/Time Comment    Discharge Stable Wed Aug 12, 2020  8:21 PM Amon Duverney discharge to home/self care              Follow-up Information     Follow up With Specialties Details Why Contact Info Additional [phone] In 1 week  [address], [address]  [address], [address], [address] [place], [place], [id number] [facility] Cardiology Schedule an appointment as soon as possible for a visit   [facility] [id number] [facility], [address], [place], [place], [address] [phone]          Patient's Medications   Discharge Prescriptions    No medications on file     No discharge procedures on file      PDMP Review     None          ED Provider  Electronically Signed by           Franchesca Leal DO  08/12/20 2030

## 2020-10-13 DIAGNOSIS — K21.9 GASTROESOPHAGEAL REFLUX DISEASE: ICD-10-CM

## 2020-10-13 RX ORDER — PANTOPRAZOLE SODIUM 40 MG/1
40 TABLET, DELAYED RELEASE ORAL DAILY
Qty: 30 TABLET | Refills: 5 | Status: SHIPPED | OUTPATIENT
Start: 2020-10-13 | End: 2021-03-10 | Stop reason: SDUPTHER

## 2020-11-13 DIAGNOSIS — Z79.4 TYPE 2 DIABETES MELLITUS WITH HYPERGLYCEMIA, WITH LONG-TERM CURRENT USE OF INSULIN (HCC): ICD-10-CM

## 2020-11-13 DIAGNOSIS — E11.65 TYPE 2 DIABETES MELLITUS WITH HYPERGLYCEMIA, WITH LONG-TERM CURRENT USE OF INSULIN (HCC): ICD-10-CM

## 2020-11-19 DIAGNOSIS — E11.65 TYPE 2 DIABETES MELLITUS WITH HYPERGLYCEMIA, WITH LONG-TERM CURRENT USE OF INSULIN (HCC): ICD-10-CM

## 2020-11-19 DIAGNOSIS — Z79.4 TYPE 2 DIABETES MELLITUS WITH HYPERGLYCEMIA, WITH LONG-TERM CURRENT USE OF INSULIN (HCC): ICD-10-CM

## 2020-11-19 RX ORDER — PEN NEEDLE, DIABETIC 31 GX5/16"
NEEDLE, DISPOSABLE MISCELLANEOUS DAILY
Qty: 100 EACH | Refills: 11 | Status: SHIPPED | OUTPATIENT
Start: 2020-11-19 | End: 2021-09-28

## 2021-01-20 ENCOUNTER — TELEPHONE (OUTPATIENT)
Dept: FAMILY MEDICINE CLINIC | Facility: CLINIC | Age: 71
End: 2021-01-20

## 2021-01-20 DIAGNOSIS — I10 ESSENTIAL HYPERTENSION: ICD-10-CM

## 2021-01-20 DIAGNOSIS — I86.1 LEFT VARICOCELE: Primary | ICD-10-CM

## 2021-01-20 RX ORDER — AMLODIPINE BESYLATE 10 MG/1
10 TABLET ORAL DAILY
Qty: 30 TABLET | Refills: 3 | Status: SHIPPED | OUTPATIENT
Start: 2021-01-20

## 2021-01-20 RX ORDER — LOSARTAN POTASSIUM 100 MG/1
100 TABLET ORAL DAILY
Qty: 30 TABLET | Refills: 3 | Status: SHIPPED | OUTPATIENT
Start: 2021-01-20 | End: 2021-01-22 | Stop reason: ALTCHOICE

## 2021-01-21 DIAGNOSIS — R06.02 SHORTNESS OF BREATH: ICD-10-CM

## 2021-01-21 RX ORDER — TERAZOSIN 2 MG/1
2 CAPSULE ORAL
COMMUNITY
Start: 2020-09-01 | End: 2022-04-20 | Stop reason: DRUGHIGH

## 2021-01-21 RX ORDER — INSULIN ASPART 100 [IU]/ML
INJECTION, SUSPENSION SUBCUTANEOUS
COMMUNITY
Start: 2020-09-29

## 2021-01-21 RX ORDER — INSULIN ASPART 100 [IU]/ML
60 INJECTION, SUSPENSION SUBCUTANEOUS
Qty: 5 PEN | Status: CANCELLED | OUTPATIENT
Start: 2021-01-21

## 2021-01-21 RX ORDER — FLASH GLUCOSE SENSOR
KIT MISCELLANEOUS
COMMUNITY
Start: 2020-11-03

## 2021-01-21 RX ORDER — ASPIRIN 81 MG/1
81 TABLET ORAL DAILY
COMMUNITY
Start: 2020-09-01 | End: 2021-01-21 | Stop reason: SDUPTHER

## 2021-01-21 NOTE — PROGRESS NOTES
Assessment/Plan:    Essential hypertension  BP goal <140/90, not at goal  Will d/c Losartan and start patient on Hyzaar 100-12 5 mg, 1 tablet daily QAM to optimize BP  Renal function reviewed  Contine with Amlodipine 10 mg QHS  Low salt diet and exercise  Weight loss counseling   F/u with nurse in 1 month for BP check    Type 2 diabetes mellitus with hyperglycemia, with long-term current use of insulin ([facility])    Lab Results   Component Value Date    HGBA1C 10 1 (H) 03/07/2020     Uncontrolled, HbA1c goal 7  Follows with endocrinology, Dr Larissa Olvera (most recent visit on 09/20) with management as follows: Novolog Mix 70/30 40u sc BID with meals, Trulicity changed to 1 43XM SC weekly, Jardiance 25mg QAM    Continue with Atorvastatin 80 mg and Aspirin 81 mg   Adhere to diabetic diet and exercise   F/u for regular check ups with podiatry and ophthalmology   Encouraged pt to schedule appt with Dr Larissa Olvera soon       Diabetic polyneuropathy associated with type 2 diabetes mellitus ([facility] )    Lab Results   Component Value Date    HGBA1C 10 1 (H) 03/07/2020       Tingling, numbness in BUE and BLE   Continue with Gabapentin 100 mg TID, refilled   Optimize BG     Encounter for screening colonoscopy  Patient agreeable to undergo colonoscopy  Will send referral to general surgery for consult     Encounter for immunization  Tdap and Influenza shots today        Diagnoses and all orders for this visit:    Type 2 diabetes mellitus with hyperglycemia, with long-term current use of insulin ([facility] )    Essential hypertension  -     losartan-hydrochlorothiazide (HYZAAR) 100-12 5 MG per tablet;  Take 1 tablet by mouth daily    Overweight with body mass index (BMI) of 29 to 29 9 in adult    Dry skin dermatitis  -     ammonium lactate (LAC-HYDRIN) 12 % cream; Apply topically as needed for dry skin    Encounter for immunization  -     influenza vaccine, high-dose, PF 0 7 mL (FLUZONE HIGH-DOSE)    Encounter for screening colonoscopy  - Ambulatory referral to General Surgery; Future    Need for Tdap vaccination  -     TDAP VACCINE GREATER THAN OR EQUAL TO 6YO IM    Diabetic polyneuropathy associated with type 2 diabetes mellitus (HCC)  -     gabapentin (NEURONTIN) 100 mg capsule; Take 1 capsule (100 mg total) by mouth 3 (three) times a day    Other orders  -     Cancel: POCT hemoglobin A1c  -     insulin aspart protamine-insulin aspart (NovoLOG Mix 70/30 FlexPen) 100 Units/mL injection pen; Inject 40 units subcutaneously twice a day before meals  -     Discontinue: aspirin (Aspirin 81) 81 mg EC tablet; Take 81 mg by mouth daily  -     Continuous Blood Gluc Sensor (FreeStyle Lucas 14 Day Sensor) MISC  -     terazosin (HYTRIN) 2 mg capsule; Take 2 mg by mouth          Subjective:      Patient ID: Vanna Gauthier is a 65 y o  male who presents today for chronic conditions f/u visit  HPI    Patient mentions he follows with Dr Mena Gonsalez for diabetes management  Last time he saw him was 09/01/20  He is aware to schedule appt soon  Endorses compliance with diabetes meds at home  Compliant at home with taking all HTN medications as prescribed at bedtime  Denies fevers, chills, visual disturbance, chest pain, sob, n/v/d, abdominal pain  Preventative care  Influenza: today  T-dap: today    The following portions of the patient's history were reviewed and updated as appropriate: allergies, current medications, past family history, past medical history, past social history, past surgical history and problem list     Review of Systems   Constitutional: Negative for chills and fever  HENT: Negative for sore throat  Eyes: Negative for visual disturbance  Respiratory: Negative  Negative for cough and shortness of breath  Cardiovascular: Negative for chest pain, palpitations and leg swelling  Gastrointestinal: Negative  Negative for abdominal pain, blood in stool, constipation, diarrhea, nausea and vomiting  Genitourinary: Negative  Negative for dysuria and hematuria  Musculoskeletal: Positive for arthralgias  Tingling and numbness in BUE and BLE    Skin: Negative for rash  Intermittent itchy skin on arms and legs    Neurological: Negative for dizziness and headaches  Psychiatric/Behavioral: The patient is not nervous/anxious  Objective:      /76 (BP Location: Left arm, Patient Position: Sitting, Cuff Size: Standard)   Pulse 69   Temp (!) 96 9 °F (36 1 °C) (Temporal)   Resp 18   Ht 5' 7" (1 702 m)   Wt 85 kg (187 lb 4 8 oz)   SpO2 98%   BMI 29 34 kg/m²          Physical Exam  Vitals signs and nursing note reviewed  Constitutional:       General: He is not in acute distress  Appearance: Normal appearance  He is well-developed  He is not ill-appearing, toxic-appearing or diaphoretic  HENT:      Head: Normocephalic and atraumatic  Nose: Nose normal    Eyes:      Extraocular Movements: Extraocular movements intact  Conjunctiva/sclera: Conjunctivae normal       Pupils: Pupils are equal, round, and reactive to light  Neck:      Musculoskeletal: Normal range of motion and neck supple  Cardiovascular:      Rate and Rhythm: Normal rate and regular rhythm  Heart sounds: Normal heart sounds  Pulmonary:      Effort: Pulmonary effort is normal  No respiratory distress  Breath sounds: Normal breath sounds  Abdominal:      General: Bowel sounds are normal       Palpations: Abdomen is soft  Tenderness: There is no abdominal tenderness  Musculoskeletal: Normal range of motion  General: No swelling, tenderness or deformity  Right lower leg: No edema  Left lower leg: No edema  Comments: BLE Varicosities  Venous insufficiency skin changes    Skin:     General: Skin is warm  Findings: No rash  Neurological:      Mental Status: He is alert and oriented to person, place, and time     Psychiatric:         Mood and Affect: Mood normal          Behavior: Behavior normal          Thought Content:  Thought content normal          Judgment: Judgment normal

## 2021-01-21 NOTE — TELEPHONE ENCOUNTER
Via fax Ph:    Pharmacy requested lisinopril 20mg   Take one tab by mouth once daily      Medication is not on med list nor on past med list?

## 2021-01-21 NOTE — ASSESSMENT & PLAN NOTE
Lab Results   Component Value Date    HGBA1C 10 1 (H) 03/07/2020     Uncontrolled, HbA1c goal 7  Follows with endocrinology, Dr Junior Carter (most recent visit on 09/20) with management as follows: Novolog Mix 70/30 40u sc BID with meals, Trulicity changed to 3 46SQ SC weekly, Jardiance 25mg QAM    Continue with Atorvastatin 80 mg and Aspirin 81 mg   Adhere to diabetic diet and exercise   F/u for regular check ups with podiatry and ophthalmology   Encouraged pt to schedule appt with Dr Junior Carter soon

## 2021-01-21 NOTE — ASSESSMENT & PLAN NOTE
BP goal <140/90, not at goal  Will d/c Losartan and start patient on Hyzaar 100-12 5 mg, 1 tablet daily QAM to optimize BP  Renal function reviewed  Contine with Amlodipine 10 mg QHS  Low salt diet and exercise   Weight loss counseling   F/u with nurse in 1 month for BP check

## 2021-01-21 NOTE — TELEPHONE ENCOUNTER
Called patient and discussed management of his diabetes is under endocrinologist care  Advised he needs to call their office and have his meds refilled  I am happy to go over the other meds tomorrow at his visit  Patient voiced understanding

## 2021-01-21 NOTE — TELEPHONE ENCOUNTER
As per pt is taking only   amLODIPine (NORVASC) 10 mg tablet   losartan (COZAAR) 100 MG tablet    please d/c request for lisinopril  But need more refills as well pt has a in person visit for tomorrow 01/22/21

## 2021-01-22 ENCOUNTER — OFFICE VISIT (OUTPATIENT)
Dept: FAMILY MEDICINE CLINIC | Facility: CLINIC | Age: 71
End: 2021-01-22

## 2021-01-22 VITALS
HEIGHT: 67 IN | RESPIRATION RATE: 18 BRPM | WEIGHT: 187.3 LBS | DIASTOLIC BLOOD PRESSURE: 76 MMHG | TEMPERATURE: 96.9 F | SYSTOLIC BLOOD PRESSURE: 154 MMHG | OXYGEN SATURATION: 98 % | HEART RATE: 69 BPM | BODY MASS INDEX: 29.4 KG/M2

## 2021-01-22 DIAGNOSIS — E66.3 OVERWEIGHT WITH BODY MASS INDEX (BMI) OF 29 TO 29.9 IN ADULT: ICD-10-CM

## 2021-01-22 DIAGNOSIS — Z79.4 TYPE 2 DIABETES MELLITUS WITH HYPERGLYCEMIA, WITH LONG-TERM CURRENT USE OF INSULIN (HCC): Primary | ICD-10-CM

## 2021-01-22 DIAGNOSIS — Z23 NEED FOR TDAP VACCINATION: ICD-10-CM

## 2021-01-22 DIAGNOSIS — E11.65 TYPE 2 DIABETES MELLITUS WITH HYPERGLYCEMIA, WITH LONG-TERM CURRENT USE OF INSULIN (HCC): Primary | ICD-10-CM

## 2021-01-22 DIAGNOSIS — E11.42 DIABETIC POLYNEUROPATHY ASSOCIATED WITH TYPE 2 DIABETES MELLITUS (HCC): ICD-10-CM

## 2021-01-22 DIAGNOSIS — Z12.11 ENCOUNTER FOR SCREENING COLONOSCOPY: ICD-10-CM

## 2021-01-22 DIAGNOSIS — Z23 ENCOUNTER FOR IMMUNIZATION: ICD-10-CM

## 2021-01-22 DIAGNOSIS — I10 ESSENTIAL HYPERTENSION: ICD-10-CM

## 2021-01-22 DIAGNOSIS — L85.3 DRY SKIN DERMATITIS: ICD-10-CM

## 2021-01-22 PROCEDURE — G0008 ADMIN INFLUENZA VIRUS VAC: HCPCS | Performed by: FAMILY MEDICINE

## 2021-01-22 PROCEDURE — 90715 TDAP VACCINE 7 YRS/> IM: CPT | Performed by: FAMILY MEDICINE

## 2021-01-22 PROCEDURE — 3078F DIAST BP <80 MM HG: CPT | Performed by: FAMILY MEDICINE

## 2021-01-22 PROCEDURE — 3077F SYST BP >= 140 MM HG: CPT | Performed by: FAMILY MEDICINE

## 2021-01-22 PROCEDURE — 3008F BODY MASS INDEX DOCD: CPT | Performed by: FAMILY MEDICINE

## 2021-01-22 PROCEDURE — 90662 IIV NO PRSV INCREASED AG IM: CPT | Performed by: FAMILY MEDICINE

## 2021-01-22 PROCEDURE — 1160F RVW MEDS BY RX/DR IN RCRD: CPT | Performed by: FAMILY MEDICINE

## 2021-01-22 PROCEDURE — 1036F TOBACCO NON-USER: CPT | Performed by: FAMILY MEDICINE

## 2021-01-22 PROCEDURE — 99213 OFFICE O/P EST LOW 20 MIN: CPT | Performed by: FAMILY MEDICINE

## 2021-01-22 PROCEDURE — 90471 IMMUNIZATION ADMIN: CPT | Performed by: FAMILY MEDICINE

## 2021-01-22 RX ORDER — LOSARTAN POTASSIUM AND HYDROCHLOROTHIAZIDE 12.5; 1 MG/1; MG/1
1 TABLET ORAL DAILY
Qty: 30 TABLET | Refills: 5 | Status: SHIPPED | OUTPATIENT
Start: 2021-01-22 | End: 2021-06-15

## 2021-01-22 RX ORDER — AMMONIUM LACTATE 12 G/100G
CREAM TOPICAL AS NEEDED
Qty: 385 G | Refills: 0 | Status: SHIPPED | OUTPATIENT
Start: 2021-01-22 | End: 2021-02-26

## 2021-01-22 RX ORDER — GABAPENTIN 100 MG/1
100 CAPSULE ORAL 3 TIMES DAILY
Qty: 90 CAPSULE | Refills: 2 | Status: SHIPPED | OUTPATIENT
Start: 2021-01-22 | End: 2021-04-09

## 2021-01-22 NOTE — ASSESSMENT & PLAN NOTE
Lab Results   Component Value Date    HGBA1C 10 1 (H) 03/07/2020       Tingling, numbness in BUE and BLE   Continue with Gabapentin 100 mg TID, refilled   Optimize BG

## 2021-01-22 NOTE — PATIENT INSTRUCTIONS
Lifestyle Medicine Tip Sheet- [place]    1  Coma alimentos predominantemente menos procesados, makayla comida rápida, cenas de televisión y tocino  2  Coma cerca de la naturaleza (mercados de agricultores, productos frescos o congelados)    3  Coma michael dieta predominantemente basada en plantas  a  Verdes frondosos oscuros osmel   b  Frutas y vegetales  c   Granos integrales: dilia integral, apenas, bayas de dilia, quinua, muriel cortada en gaurav, arroz integral, pasta integral   d  Legumbres: frijoles, frijoles pintos, frijoles blancos, frijoles negros, garbanzos (garbanzos), frijoles lima (maduros, secos), arvejas, lentejas y edamame (frijoles de soya verdes)      4  Al menos la mitad del plato debe contener frutas o verduras  5  El líquido debe ser predominantemente agua (limite el refresco y [place])    6  Brittany el tamaño de la porción  7  ¿Qué alimentos rachele evitar o limitar? - Grasas: Específicamente saturadas y grasas trans  Se encuentran en margarinas, muchas comidas rápidas y algunos productos horneados comprados en la suraj  Las grasas saturadas y grasas trans pueden elevar rider nivel de colesterol y rider probabilidad de contraer enfermedades cardíacas  - Cuando cocine, es mejor no usar aceites, evelin si es necesario, trate de limitar la cantidad de aceite utilizado, ya que el aceite contiene muchas calorías por volumen y es muy poco saludable cuando se calienta roma la cocción   - Azúcar: limite o evite el azúcar, los dulces y los granos refinados  Los granos refinados se encuentran en el pan robertson, el arroz robertson, la mayoría de las formas de pasta y la mayoría de los alimentos "aperitivos" envasados  - Intente no cocinar con ottoniel y evite agregar ottoniel adicional a ann comidas  - Alina [place]: los estudios mcgarry demostrado que comer mucha richar Madsen riesgo de ciertos problemas de [place], makayla enfermedades cardíacas y cáncer  8  7-9 horas de sueño    9   Ejercicio diario mínimo de 30 minutos (caminando alrededor de la [place])    10  Socialización (amigos y familiares)    - Explora tu vecindario  Ve al parque, pasa tiempo en la biblioteca  Si está interesado, puede leer más sobre las opciones de alimentos saludables en los siguientes sitios web:  a  [employer]  org  b  Home cooking recipes: https://www [employer]/  c  http://francisco info/  d  [facility]  org

## 2021-02-26 DIAGNOSIS — L85.3 DRY SKIN DERMATITIS: ICD-10-CM

## 2021-02-26 RX ORDER — AMMONIUM LACTATE 12 G/100G
CREAM TOPICAL
Qty: 385 G | Refills: 0 | Status: SHIPPED | OUTPATIENT
Start: 2021-02-26 | End: 2021-04-02

## 2021-03-10 DIAGNOSIS — K21.9 GASTROESOPHAGEAL REFLUX DISEASE: ICD-10-CM

## 2021-03-10 RX ORDER — PANTOPRAZOLE SODIUM 40 MG/1
40 TABLET, DELAYED RELEASE ORAL DAILY
Qty: 30 TABLET | Refills: 5 | Status: SHIPPED | OUTPATIENT
Start: 2021-03-10 | End: 2021-08-06

## 2021-04-02 DIAGNOSIS — L85.3 DRY SKIN DERMATITIS: ICD-10-CM

## 2021-04-02 RX ORDER — AMMONIUM LACTATE 12 G/100G
CREAM TOPICAL
Qty: 385 G | Refills: 0 | Status: SHIPPED | OUTPATIENT
Start: 2021-04-02 | End: 2021-05-08

## 2021-04-09 ENCOUNTER — IMMUNIZATIONS (OUTPATIENT)
Dept: FAMILY MEDICINE CLINIC | Facility: HOSPITAL | Age: 71
End: 2021-04-09

## 2021-04-09 DIAGNOSIS — E11.42 DIABETIC POLYNEUROPATHY ASSOCIATED WITH TYPE 2 DIABETES MELLITUS (HCC): ICD-10-CM

## 2021-04-09 RX ORDER — GABAPENTIN 100 MG/1
CAPSULE ORAL
Qty: 90 CAPSULE | Refills: 3 | Status: SHIPPED | OUTPATIENT
Start: 2021-04-09 | End: 2021-07-23

## 2021-04-15 ENCOUNTER — OFFICE VISIT (OUTPATIENT)
Dept: FAMILY MEDICINE CLINIC | Facility: CLINIC | Age: 71
End: 2021-04-15
Payer: COMMERCIAL

## 2021-04-15 ENCOUNTER — TELEPHONE (OUTPATIENT)
Dept: ADMINISTRATIVE | Facility: OTHER | Age: 71
End: 2021-04-15

## 2021-04-15 VITALS
DIASTOLIC BLOOD PRESSURE: 72 MMHG | HEART RATE: 74 BPM | RESPIRATION RATE: 18 BRPM | HEIGHT: 67 IN | OXYGEN SATURATION: 98 % | BODY MASS INDEX: 27.65 KG/M2 | SYSTOLIC BLOOD PRESSURE: 138 MMHG | WEIGHT: 176.2 LBS

## 2021-04-15 DIAGNOSIS — Z79.899 CONTROLLED SUBSTANCE AGREEMENT SIGNED: ICD-10-CM

## 2021-04-15 DIAGNOSIS — Z76.89 ENCOUNTER TO ESTABLISH CARE: ICD-10-CM

## 2021-04-15 DIAGNOSIS — I10 ESSENTIAL HYPERTENSION: ICD-10-CM

## 2021-04-15 DIAGNOSIS — M54.41 CHRONIC MIDLINE LOW BACK PAIN WITH RIGHT-SIDED SCIATICA: ICD-10-CM

## 2021-04-15 DIAGNOSIS — G89.29 CHRONIC MIDLINE LOW BACK PAIN WITH RIGHT-SIDED SCIATICA: ICD-10-CM

## 2021-04-15 DIAGNOSIS — Z00.00 ENCOUNTER FOR SUBSEQUENT ANNUAL WELLNESS VISIT (AWV) IN MEDICARE PATIENT: Primary | ICD-10-CM

## 2021-04-15 DIAGNOSIS — E11.42 DIABETIC POLYNEUROPATHY ASSOCIATED WITH TYPE 2 DIABETES MELLITUS (HCC): ICD-10-CM

## 2021-04-15 DIAGNOSIS — E11.65 TYPE 2 DIABETES MELLITUS WITH HYPERGLYCEMIA, WITH LONG-TERM CURRENT USE OF INSULIN (HCC): ICD-10-CM

## 2021-04-15 DIAGNOSIS — Z79.4 TYPE 2 DIABETES MELLITUS WITH HYPERGLYCEMIA, WITH LONG-TERM CURRENT USE OF INSULIN (HCC): ICD-10-CM

## 2021-04-15 DIAGNOSIS — N18.2 CKD (CHRONIC KIDNEY DISEASE) STAGE 2, GFR 60-89 ML/MIN: ICD-10-CM

## 2021-04-15 DIAGNOSIS — N52.9 ERECTILE DYSFUNCTION, UNSPECIFIED ERECTILE DYSFUNCTION TYPE: ICD-10-CM

## 2021-04-15 LAB — SL AMB POCT HEMOGLOBIN AIC: 13.2 (ref ?–6.5)

## 2021-04-15 PROCEDURE — 1170F FXNL STATUS ASSESSED: CPT | Performed by: FAMILY MEDICINE

## 2021-04-15 PROCEDURE — 1160F RVW MEDS BY RX/DR IN RCRD: CPT | Performed by: FAMILY MEDICINE

## 2021-04-15 PROCEDURE — G0439 PPPS, SUBSEQ VISIT: HCPCS | Performed by: FAMILY MEDICINE

## 2021-04-15 PROCEDURE — 3288F FALL RISK ASSESSMENT DOCD: CPT | Performed by: FAMILY MEDICINE

## 2021-04-15 PROCEDURE — 1125F AMNT PAIN NOTED PAIN PRSNT: CPT | Performed by: FAMILY MEDICINE

## 2021-04-15 PROCEDURE — 83036 HEMOGLOBIN GLYCOSYLATED A1C: CPT | Performed by: FAMILY MEDICINE

## 2021-04-15 PROCEDURE — 3075F SYST BP GE 130 - 139MM HG: CPT | Performed by: FAMILY MEDICINE

## 2021-04-15 PROCEDURE — 3008F BODY MASS INDEX DOCD: CPT | Performed by: FAMILY MEDICINE

## 2021-04-15 PROCEDURE — 3078F DIAST BP <80 MM HG: CPT | Performed by: FAMILY MEDICINE

## 2021-04-15 PROCEDURE — 99203 OFFICE O/P NEW LOW 30 MIN: CPT | Performed by: FAMILY MEDICINE

## 2021-04-15 PROCEDURE — 3066F NEPHROPATHY DOC TX: CPT | Performed by: FAMILY MEDICINE

## 2021-04-15 PROCEDURE — 3725F SCREEN DEPRESSION PERFORMED: CPT | Performed by: FAMILY MEDICINE

## 2021-04-15 RX ORDER — ASPIRIN 81 MG/1
81 TABLET ORAL DAILY
COMMUNITY
Start: 2021-04-14

## 2021-04-15 RX ORDER — LOSARTAN POTASSIUM 100 MG/1
100 TABLET ORAL DAILY
COMMUNITY
Start: 2021-04-14 | End: 2022-04-20 | Stop reason: SDUPTHER

## 2021-04-15 RX ORDER — IBUPROFEN 800 MG/1
TABLET ORAL
COMMUNITY
Start: 2021-02-24

## 2021-04-15 RX ORDER — TRAMADOL HYDROCHLORIDE 50 MG/1
50 TABLET ORAL EVERY 6 HOURS PRN
Qty: 30 TABLET | Refills: 0 | Status: SHIPPED | OUTPATIENT
Start: 2021-04-15 | End: 2021-06-17 | Stop reason: SDUPTHER

## 2021-04-15 NOTE — ASSESSMENT & PLAN NOTE
He requested yohimbine which he used to take but it appears to be unavailable    [facility] urology referral

## 2021-04-15 NOTE — ASSESSMENT & PLAN NOTE
Initial reading was mildly elevated but then was normal when I retook it  He will continue amlodipine 10 mg daily and losartan-HCTZ 100-12  5

## 2021-04-15 NOTE — PROGRESS NOTES
Assessment/Plan:    Type 2 diabetes mellitus with hyperglycemia, with long-term current use of insulin ([facility])    Lab Results   Component Value Date    HGBA1C 13 2 (A) 04/15/2021     Unfortunately diabetes has been uncontrolled for quite some time  He has been managed with NovoLog 70 30 mix 40 units b i d  Calmer Trulicity 3 67 mg weekly, metformin 1000 mg b i d , and Januvia 25 mg daily  He sees endocrinology, Dr Destiney Gotti, every 3 months  Will await report from his office visit yesterday  Diabetic polyneuropathy associated with type 2 diabetes mellitus ([facility] [address] )    Lab Results   Component Value Date    HGBA1C 13 2 (A) 04/15/2021     Stable on gabapentin    Essential hypertension  Initial reading was mildly elevated but then was normal when I retook it  He will continue amlodipine 10 mg daily and losartan-HCTZ 100-12  5  CKD (chronic kidney disease) stage 2, GFR 60-89 ml/min  Lab Results   Component Value Date    EGFR 89 08/12/2020    EGFR 68 03/07/2020    EGFR 54 (L) 08/06/2019    CREATININE 0 82 08/12/2020    CREATININE 1 09 03/07/2020    CREATININE 1 32 08/06/2019     He is endocrinologist ordered blood work  He will be having this done in the next 2 weeks and then having a follow-up visit  Chronic midline low back pain with right-sided sciatica  He has chronic low back pain  Sometimes he has right-sided sciatica with pains to right mid thigh  He states he has done physical therapy in the past but did not get much relief  He requested a script for tramadol and we had discussion about controlled substances contract  PDMP shows he has not had any narcotics recently  Gave rx #30    Erectile dysfunction  He requested yohimbine which he used to take but it appears to be unavailable    Gave urology referral       Diagnoses and all orders for this visit:    Encounter for subsequent annual wellness visit (AWV) in Medicare patient    Encounter to establish care    Type 2 diabetes mellitus with hyperglycemia, with long-term current use of insulin (HCC)  -     POCT hemoglobin A1c    Diabetic polyneuropathy associated with type 2 diabetes mellitus (HCC)    Essential hypertension    CKD (chronic kidney disease) stage 2, GFR 60-89 ml/min    Chronic midline low back pain with right-sided sciatica  -     traMADol (ULTRAM) 50 mg tablet; Take 1 tablet (50 mg total) by mouth every 6 (six) hours as needed for moderate pain    Erectile dysfunction, unspecified erectile dysfunction type  -     Ambulatory referral to Urology; Future    Controlled substance agreement signed    Other orders  -     aspirin (ECOTRIN LOW STRENGTH) 81 mg EC tablet; Take 81 mg by mouth daily  -     Empagliflozin 25 MG TABS; Take 25 mg by mouth  -     ibuprofen (MOTRIN) 800 mg tablet  -     losartan (COZAAR) 100 MG tablet; Take 100 mg by mouth daily  -     glucose blood test strip; Use Three times a day          Subjective:      Patient ID: Prince Friend is a 65 y o  male  He is here to establish  He has been treated for diabetes since 2004  He has been going to endocrine,Dr Alvarado Hatillo every 3 months  He had visit there yesterday  Diabetes has been way out of control with Hgb A1c usually 10    He had J and J vaccine 4/9/2021          The following portions of the patient's history were reviewed and updated as appropriate: allergies, current medications, past family history, past medical history, past social history, past surgical history and problem list     Review of Systems   Constitutional: Negative for activity change, chills, fatigue and fever  HENT: Positive for hearing loss  Negative for congestion, ear pain, sinus pressure and sore throat  Eyes: Negative for pain and visual disturbance  Respiratory: Negative for cough, chest tightness, shortness of breath and wheezing  Cardiovascular: Negative for chest pain, palpitations and leg swelling  Gastrointestinal: Positive for constipation   Negative for abdominal pain, blood in stool, diarrhea, nausea and vomiting  Endocrine: Negative for polydipsia and polyuria  Genitourinary: Negative for difficulty urinating, dysuria, frequency and urgency  Musculoskeletal: Positive for back pain  Negative for arthralgias, joint swelling and myalgias  Skin: Negative for rash  Neurological: Negative for dizziness, weakness, numbness and headaches  Hematological: Negative for adenopathy  Does not bruise/bleed easily  Psychiatric/Behavioral: Negative for dysphoric mood  The patient is not nervous/anxious  Objective:      /72   Pulse 74   Resp 18   Ht 5' 7" (1 702 m)   Wt 79 9 kg (176 lb 3 2 oz)   SpO2 98%   BMI 27 60 kg/m²          Physical Exam  Vitals signs and nursing note reviewed  Constitutional:       Appearance: Normal appearance  HENT:      Head: Normocephalic and atraumatic  Comments: Large perf R TM  Left TM obscured by cerumen     Mouth/Throat:      Mouth: Mucous membranes are moist       Comments: Upper mouth edentulous  Few teeth lower  Neck:      Vascular: No carotid bruit  Cardiovascular:      Rate and Rhythm: Normal rate and regular rhythm  Pulses: Normal pulses  Pulmonary:      Effort: Pulmonary effort is normal       Breath sounds: Normal breath sounds  Abdominal:      General: Bowel sounds are normal  There is no distension  Palpations: There is no mass  Tenderness: There is no abdominal tenderness  Musculoskeletal:      Right lower leg: No edema  Left lower leg: No edema  Lymphadenopathy:      Cervical: No cervical adenopathy  Skin:     General: Skin is warm and dry  Neurological:      General: No focal deficit present  Mental Status: He is alert and oriented to person, place, and time     Psychiatric:         Mood and Affect: Mood normal          Behavior: Behavior normal

## 2021-04-15 NOTE — TELEPHONE ENCOUNTER
Upon review of the In Basket request and the patient's chart, initial outreach has been made via fax, please see Contacts section for details       Thank you  Taiwo Crawford MA

## 2021-04-15 NOTE — ASSESSMENT & PLAN NOTE
Lab Results   Component Value Date    HGBA1C 13 2 (A) 04/15/2021     Unfortunately diabetes has been uncontrolled for quite some time  He has been managed with NovoLog 70 30 mix 40 units b i d  Calmer Trulicity 1 35 mg weekly, metformin 1000 mg b i d , and Januvia 25 mg daily  He sees endocrinology, Dr Nick Jones, every 3 months  Will await report from his office visit yesterday

## 2021-04-15 NOTE — LETTER
Diabetic Foot Exam Form    Date Requested: 04/15/21  Patient: Geri Ardon  Patient : 1950   Referring Provider: Saudnra Mcfarlane MD    Diabetic Foot Exam Performed with shoes and socks removed        Yes         No     Date of Diabetic Foot Exam ______________________________  Risk Score ____________________________________________    Left Foot       Visual Inspection         Monofilament Testing Sensory Exam        Pedal Pulses         Additional Comments         Right Foot      Visual Inspection         Monofilament Testing Sensory Exam       Pedal Pulses         Additional Comments         Comments __________________________________________________________    Practice Providing Exam ______________________________________________    Exam Performed By (print name) _______________________________________      Provider Signature ___________________________________________________      These reports are needed for  compliance    Please fax this completed form and a copy of the Diabetic Foot Exam report to our office located at [place] [address] as soon as possible to [phone] byron López Herd: Phone [phone]    We thank you for your assistance in treating our mutual patient

## 2021-04-15 NOTE — TELEPHONE ENCOUNTER
----- Message from Aga Garcia sent at 4/15/2021 10:23 AM EDT -----  Regarding: Foot exam / [facility]  04/15/21 10:23 AM    Hello, our patient Alfonso Harrison has had Diabetic Eye Exam completed/performed   Please assist in updating the patient chart by making an External outreach to     [address] [place] [address]  Podiatry   Phone: [phone]; Fax: [phone]      Thank you,  ALEJANDRO Garcia  64

## 2021-04-15 NOTE — PROGRESS NOTES
Assessment and Plan:     Problem List Items Addressed This Visit        Endocrine    Type 2 diabetes mellitus with hyperglycemia, with long-term current use of insulin ([address] ) - Primary    Relevant Medications    Empagliflozin 25 MG TABS    Other Relevant Orders    POCT hemoglobin A1c        BMI Counseling: Body mass index is 27 6 kg/m²  The BMI is above normal  Nutrition recommendations include decreasing portion sizes  Exercise recommendations include moderate physical activity 150 minutes/week  No pharmacotherapy was ordered  Preventive health issues were discussed with patient, and age appropriate screening tests were ordered as noted in patient's After Visit Summary  Personalized health advice and appropriate referrals for health education or preventive services given if needed, as noted in patient's After Visit Summary       History of Present Illness:     Patient presents for Medicare Annual Wellness visit    Patient Care Team:  Tate Reynolds MD as PCP - General (Family Medicine)     Problem List:     Patient Active Problem List   Diagnosis    Mixed hyperlipidemia    Gastroesophageal reflux disease    Type 2 diabetes mellitus with hyperglycemia, with long-term current use of insulin ([address] )    Essential hypertension    Right lower quadrant abdominal pain    Chronic midline low back pain with right-sided sciatica    Benign non-nodular prostatic hyperplasia without lower urinary tract symptoms    Left varicocele    Microhematuria    CKD (chronic kidney disease) stage 2, GFR 60-89 ml/min    Wellness examination    Screening for AAA (abdominal aortic aneurysm)    Encounter for screening colonoscopy    Encounter for screening for lung cancer    Hearing deficit, left    Overweight with body mass index (BMI) of 29 to 29 9 in adult    Diabetic polyneuropathy associated with type 2 diabetes mellitus ([address] )    Encounter for immunization      Past Medical and Surgical History:     Past Medical History:   Diagnosis Date    Diabetes mellitus ([facility] [address] )      Past Surgical History:   Procedure Laterality Date    APPENDECTOMY Right 1970    PARAMEDIAN INCISION      Family History:     No family history on file     Social History:        Social History     Socioeconomic History    Marital status: /Civil Union     Spouse name: None    Number of children: None    Years of education: None    Highest education level: None   Occupational History    None   Social Needs    Financial resource strain: None    Food insecurity     Worry: None     Inability: None    Transportation needs     Medical: None     Non-medical: None   Tobacco Use    Smoking status: Former Smoker     Types: Cigarettes    Smokeless tobacco: Former User    Tobacco comment: Quit 2001   Substance and Sexual Activity    Alcohol use: Never     Frequency: Never    Drug use: Never    Sexual activity: None   Lifestyle    Physical activity     Days per week: None     Minutes per session: None    Stress: None   Relationships    Social connections     Talks on phone: None     Gets together: None     Attends Quaker service: None     Active member of club or organization: None     Attends meetings of clubs or organizations: None     Relationship status: None    Intimate partner violence     Fear of current or ex partner: None     Emotionally abused: None     Physically abused: None     Forced sexual activity: None   Other Topics Concern    None   Social History Narrative    None      Medications and Allergies:     Current Outpatient Medications   Medication Sig Dispense Refill    aspirin (ECOTRIN LOW STRENGTH) 81 mg EC tablet Take 81 mg by mouth daily      Empagliflozin 25 MG TABS Take 25 mg by mouth      glucose blood test strip Use Three times a day      losartan (COZAAR) 100 MG tablet Take 100 mg by mouth daily      Albuterol Sulfate 108 (90 Base) MCG/ACT AEPB Inhale 1 puff every 6 (six) hours as needed (shortness of breath) 1 each 3    Alcohol Swabs (Alcohol Prep) PADS Use daily 100 each 11    amLODIPine (NORVASC) 10 mg tablet Take 1 tablet (10 mg total) by mouth daily 30 tablet 3    ammonium lactate (LAC-HYDRIN) 12 % cream APPLY TOPICALLY TO AFFECTED AREA ONCE DAILY AS NEEDED FOR DRY SKIN 385 g 0    Aspirin Low Dose 81 MG EC tablet TAKE ONE TABLET BY MOUTH DAILY 30 tablet 11    atorvastatin (LIPITOR) 80 mg tablet Take 80 mg by mouth daily with dinner      Continuous Blood Gluc Sensor (FreeStyle Lucas 14 Day Sensor) MISC       Dulaglutide (Trulicity) 4 17 CM/3 2YO SOPN Inject 0 75 mg under the skin once a week      EASY COMFORT PEN NEEDLES 32G X 4 MM MISC       ezetimibe (ZETIA) 10 mg tablet Take 10 mg by mouth daily      gabapentin (NEURONTIN) 100 mg capsule TAKE ONE CAPSULE BY MOUTH THREE TIMES A DAY 90 capsule 3    glucose blood (FREESTYLE LITE) test strip 1 each by Other route 4 (four) times a day 100 each 11    ibuprofen (MOTRIN) 800 mg tablet       insulin aspart protamine-insulin aspart (NovoLOG Mix 70/30 FlexPen) 100 Units/mL injection pen Inject 40 units subcutaneously twice a day before meals      JANUVIA 25 MG tablet Take 25 mg by mouth daily Take 1 tablet (25 mg) per mouth once daily      losartan-hydrochlorothiazide (HYZAAR) 100-12 5 MG per tablet Take 1 tablet by mouth daily 30 tablet 5    metFORMIN (GLUCOPHAGE) 1000 MG tablet TAKE ONE TABLET BY MOUTH TWICE A DAY WITH MEALS 60 tablet 0    pantoprazole (PROTONIX) 40 mg tablet Take 1 tablet (40 mg total) by mouth daily 30 tablet 5    SURE COMFORT LANCETS 30G MISC use as directed      terazosin (HYTRIN) 2 mg capsule Take 2 mg by mouth       No current facility-administered medications for this visit        Allergies   Allergen Reactions    Iodinated Diagnostic Agents Other (See Comments)      Immunizations:     Immunization History   Administered Date(s) Administered    INFLUENZA 10/23/2008, 10/12/2009, 11/12/2010, 11/02/2011, 09/19/2012, 11/15/2013, 10/29/2014, 11/02/2015, 10/20/2016    Influenza, high dose seasonal 0 7 mL 02/04/2019, 11/25/2019, 01/22/2021    Pneumococcal Conjugate 13-Valent 10/20/2016    Pneumococcal Polysaccharide PPV23 02/20/2009    Sars-cov-2 / Covid-19 vector-nr, rS-Ad26 vaccine [facility] [employer] [place] [facility]) 04/09/2021    Tdap 02/20/2009, 01/22/2021      Health Maintenance:         Topic Date Due    Colorectal Cancer Screening  Never done    Hepatitis C Screening  Completed     There are no preventive care reminders to display for this patient  Medicare Health Risk Assessment:     /80 (BP Location: Left arm, Patient Position: Sitting, Cuff Size: Adult)   Pulse 74   Resp 18   Ht 5' 7" (1 702 m)   Wt 79 9 kg (176 lb 3 2 oz)   SpO2 98%   BMI 27 60 kg/m²      Wagner is here for his Subsequent Wellness visit  Health Risk Assessment:   Patient rates overall health as fair  Patient feels that their physical health rating is much better  Patient is satisfied with their life  Eyesight was rated as same  Hearing was rated as same  Patient feels that their emotional and mental health rating is same  Patients states they are never, rarely angry  Patient states they are never, rarely unusually tired/fatigued  Pain experienced in the last 7 days has been some  Patient's pain rating has been 8/10  Patient states that he has experienced no weight loss or gain in last 6 months  Depression Screening:   PHQ-2 Score: 0      Fall Risk Screening: In the past year, patient has experienced: no history of falling in past year      Home Safety:  Patient has trouble with stairs inside or outside of their home  Patient has working smoke alarms and has working carbon monoxide detector  Home safety hazards include: none  Nutrition:   Current diet is Regular  Medications:   Patient is currently taking over-the-counter supplements  OTC medications include: see medication list  Patient is able to manage medications       Activities of Daily Living (ADLs)/Instrumental Activities of Daily Living (IADLs):   Walk and transfer into and out of bed and chair?: Yes  Dress and groom yourself?: Yes    Bathe or shower yourself?: Yes    Feed yourself? Yes  Do your laundry/housekeeping?: Yes  Manage your money, pay your bills and track your expenses?: Yes  Make your own meals?: Yes    Do your own shopping?: Yes    Previous Hospitalizations:   Any hospitalizations or ED visits within the last 12 months?: No      Advance Care Planning:   Living will: Yes    Durable POA for healthcare: Yes    Advanced directive: Yes    Five wishes given: Yes      PREVENTIVE SCREENINGS      Cardiovascular Screening:    General: Screening Not Indicated and History Lipid Disorder      Diabetes Screening:     General: Screening Not Indicated and History Diabetes      Abdominal Aortic Aneurysm (AAA) Screening:    Risk factors include: age between 73-69 yo and tobacco use        Lung Cancer Screening:     General: Screening Not Indicated      Hepatitis C Screening:    General: Screening Current    Screening, Brief Intervention, and Referral to Treatment (SBIRT)    Screening  Typical number of drinks in a day: 0  Typical number of drinks in a week: 0  Interpretation: Low risk drinking behavior      AUDIT-C Screenin) How often did you have a drink containing alcohol in the past year? never  2) How many drinks did you have on a typical day when you were drinking in the past year? never  3) How often did you have 6 or more drinks on one occasion in the past year? never    AUDIT-C Score: 0  Interpretation: Score 0-3 (male): Negative screen for alcohol misuse      Alfonso Dailey MD

## 2021-04-15 NOTE — ASSESSMENT & PLAN NOTE
Lab Results   Component Value Date    EGFR 89 08/12/2020    EGFR 68 03/07/2020    EGFR 54 (L) 08/06/2019    CREATININE 0 82 08/12/2020    CREATININE 1 09 03/07/2020    CREATININE 1 32 08/06/2019     He is endocrinologist ordered blood work  He will be having this done in the next 2 weeks and then having a follow-up visit

## 2021-04-15 NOTE — ASSESSMENT & PLAN NOTE
He has chronic low back pain  Sometimes he has right-sided sciatica with pains to right mid thigh  He states he has done physical therapy in the past but did not get much relief  He requested a script for tramadol and we had discussion about controlled substances contract  PDMP shows he has not had any narcotics recently    Gave rx #30

## 2021-04-19 NOTE — TELEPHONE ENCOUNTER
Upon review of the In Basket request we Dr called and stated that patient has not been seen since 2019 therefore is not able to complete foot exam    Any additional questions or concerns should be emailed to the Practice Liaisons via [email or web address] email, please do not reply via In Basket      Thank you  Susie Vásquez MA

## 2021-04-27 ENCOUNTER — TELEPHONE (OUTPATIENT)
Dept: FAMILY MEDICINE CLINIC | Facility: CLINIC | Age: 71
End: 2021-04-27

## 2021-04-27 NOTE — TELEPHONE ENCOUNTER
Thank you! I've cancelled today's visit with me, noted he has already established care with a different PCP

## 2021-04-30 ENCOUNTER — TRANSCRIBE ORDERS (OUTPATIENT)
Dept: LAB | Facility: HOSPITAL | Age: 71
End: 2021-04-30

## 2021-04-30 ENCOUNTER — APPOINTMENT (OUTPATIENT)
Dept: LAB | Facility: HOSPITAL | Age: 71
End: 2021-04-30
Payer: COMMERCIAL

## 2021-04-30 DIAGNOSIS — Z79.4 TYPE 2 DIABETES MELLITUS WITH HYPERGLYCEMIA, WITH LONG-TERM CURRENT USE OF INSULIN (HCC): Primary | ICD-10-CM

## 2021-04-30 DIAGNOSIS — E55.9 VITAMIN D DEFICIENCY: ICD-10-CM

## 2021-04-30 DIAGNOSIS — E11.65 TYPE 2 DIABETES MELLITUS WITH HYPERGLYCEMIA, WITH LONG-TERM CURRENT USE OF INSULIN (HCC): Primary | ICD-10-CM

## 2021-04-30 DIAGNOSIS — E78.5 DYSLIPIDEMIA: ICD-10-CM

## 2021-04-30 LAB
25(OH)D3 SERPL-MCNC: 10.5 NG/ML (ref 30–100)
ALBUMIN SERPL BCP-MCNC: 3.8 G/DL (ref 3–5.2)
ALP SERPL-CCNC: 84 U/L (ref 43–122)
ALT SERPL W P-5'-P-CCNC: 15 U/L
ANION GAP SERPL CALCULATED.3IONS-SCNC: 7 MMOL/L (ref 5–14)
AST SERPL W P-5'-P-CCNC: 25 U/L (ref 17–59)
BASOPHILS # BLD AUTO: 0.1 THOUSANDS/ΜL (ref 0–0.1)
BASOPHILS NFR BLD AUTO: 1 % (ref 0–1)
BILIRUB SERPL-MCNC: 0.55 MG/DL
BUN SERPL-MCNC: 16 MG/DL (ref 5–25)
CALCIUM SERPL-MCNC: 9.6 MG/DL (ref 8.4–10.2)
CHLORIDE SERPL-SCNC: 105 MMOL/L (ref 97–108)
CHOLEST SERPL-MCNC: 218 MG/DL
CO2 SERPL-SCNC: 29 MMOL/L (ref 22–30)
CREAT SERPL-MCNC: 0.81 MG/DL (ref 0.7–1.5)
CREAT UR-MCNC: 137 MG/DL
EOSINOPHIL # BLD AUTO: 0.1 THOUSAND/ΜL (ref 0–0.4)
EOSINOPHIL NFR BLD AUTO: 2 % (ref 0–6)
ERYTHROCYTE [DISTWIDTH] IN BLOOD BY AUTOMATED COUNT: 14.2 %
EST. AVERAGE GLUCOSE BLD GHB EST-MCNC: 303 MG/DL
GFR SERPL CREATININE-BSD FRML MDRD: 89 ML/MIN/1.73SQ M
GLUCOSE P FAST SERPL-MCNC: 95 MG/DL (ref 70–99)
HBA1C MFR BLD: 12.2 %
HCT VFR BLD AUTO: 38.8 % (ref 41–53)
HDLC SERPL-MCNC: 35 MG/DL
HGB BLD-MCNC: 12.4 G/DL (ref 13.5–17.5)
LDLC SERPL CALC-MCNC: 165 MG/DL
LYMPHOCYTES # BLD AUTO: 2.8 THOUSANDS/ΜL (ref 0.5–4)
LYMPHOCYTES NFR BLD AUTO: 49 % (ref 25–45)
MCH RBC QN AUTO: 28.9 PG (ref 26–34)
MCHC RBC AUTO-ENTMCNC: 32 G/DL (ref 31–36)
MCV RBC AUTO: 90 FL (ref 80–100)
MICROALBUMIN UR-MCNC: 19 MG/L (ref 0–20)
MICROALBUMIN/CREAT 24H UR: 14 MG/G CREATININE (ref 0–30)
MONOCYTES # BLD AUTO: 0.5 THOUSAND/ΜL (ref 0.2–0.9)
MONOCYTES NFR BLD AUTO: 9 % (ref 1–10)
NEUTROPHILS # BLD AUTO: 2.2 THOUSANDS/ΜL (ref 1.8–7.8)
NEUTS SEG NFR BLD AUTO: 38 % (ref 45–65)
NONHDLC SERPL-MCNC: 183 MG/DL
PLATELET # BLD AUTO: 232 THOUSANDS/UL (ref 150–450)
PMV BLD AUTO: 9.9 FL (ref 8.9–12.7)
POTASSIUM SERPL-SCNC: 3.8 MMOL/L (ref 3.6–5)
PROT SERPL-MCNC: 7.8 G/DL (ref 5.9–8.4)
RBC # BLD AUTO: 4.31 MILLION/UL (ref 4.5–5.9)
SODIUM SERPL-SCNC: 141 MMOL/L (ref 137–147)
TRIGL SERPL-MCNC: 88 MG/DL
TSH SERPL DL<=0.05 MIU/L-ACNC: 2.35 UIU/ML (ref 0.47–4.68)
WBC # BLD AUTO: 5.8 THOUSAND/UL (ref 4.5–11)

## 2021-04-30 PROCEDURE — 3046F HEMOGLOBIN A1C LEVEL >9.0%: CPT | Performed by: FAMILY MEDICINE

## 2021-04-30 PROCEDURE — 83036 HEMOGLOBIN GLYCOSYLATED A1C: CPT

## 2021-04-30 PROCEDURE — 82570 ASSAY OF URINE CREATININE: CPT

## 2021-04-30 PROCEDURE — 80053 COMPREHEN METABOLIC PANEL: CPT

## 2021-04-30 PROCEDURE — 36415 COLL VENOUS BLD VENIPUNCTURE: CPT

## 2021-04-30 PROCEDURE — 85025 COMPLETE CBC W/AUTO DIFF WBC: CPT

## 2021-04-30 PROCEDURE — 84443 ASSAY THYROID STIM HORMONE: CPT

## 2021-04-30 PROCEDURE — 82306 VITAMIN D 25 HYDROXY: CPT

## 2021-04-30 PROCEDURE — 80061 LIPID PANEL: CPT

## 2021-04-30 PROCEDURE — 82043 UR ALBUMIN QUANTITATIVE: CPT

## 2021-04-30 PROCEDURE — 3061F NEG MICROALBUMINURIA REV: CPT | Performed by: FAMILY MEDICINE

## 2021-05-07 DIAGNOSIS — L85.3 DRY SKIN DERMATITIS: ICD-10-CM

## 2021-05-08 RX ORDER — AMMONIUM LACTATE 12 G/100G
CREAM TOPICAL
Qty: 385 G | Refills: 0 | Status: SHIPPED | OUTPATIENT
Start: 2021-05-08 | End: 2021-06-11 | Stop reason: SDUPTHER

## 2021-06-11 DIAGNOSIS — L85.3 DRY SKIN DERMATITIS: ICD-10-CM

## 2021-06-11 RX ORDER — AMMONIUM LACTATE 12 G/100G
CREAM TOPICAL
Qty: 385 G | Refills: 0 | Status: SHIPPED | OUTPATIENT
Start: 2021-06-11 | End: 2021-07-13

## 2021-06-15 DIAGNOSIS — I10 ESSENTIAL HYPERTENSION: ICD-10-CM

## 2021-06-15 RX ORDER — LOSARTAN POTASSIUM AND HYDROCHLOROTHIAZIDE 12.5; 1 MG/1; MG/1
TABLET ORAL
Qty: 30 TABLET | Refills: 4 | Status: SHIPPED | OUTPATIENT
Start: 2021-06-15 | End: 2022-07-20 | Stop reason: ALTCHOICE

## 2021-06-17 DIAGNOSIS — M54.41 CHRONIC MIDLINE LOW BACK PAIN WITH RIGHT-SIDED SCIATICA: ICD-10-CM

## 2021-06-17 DIAGNOSIS — G89.29 CHRONIC MIDLINE LOW BACK PAIN WITH RIGHT-SIDED SCIATICA: ICD-10-CM

## 2021-06-17 RX ORDER — TRAMADOL HYDROCHLORIDE 50 MG/1
50 TABLET ORAL EVERY 6 HOURS PRN
Qty: 30 TABLET | Refills: 0 | Status: SHIPPED | OUTPATIENT
Start: 2021-06-17 | End: 2021-10-19 | Stop reason: SDUPTHER

## 2021-07-13 DIAGNOSIS — R06.02 SHORTNESS OF BREATH: ICD-10-CM

## 2021-07-13 DIAGNOSIS — L85.3 DRY SKIN DERMATITIS: ICD-10-CM

## 2021-07-13 RX ORDER — ALBUTEROL SULFATE 90 UG/1
AEROSOL, METERED RESPIRATORY (INHALATION)
Qty: 18 G | Refills: 2 | Status: SHIPPED | OUTPATIENT
Start: 2021-07-13 | End: 2022-07-20 | Stop reason: SDUPTHER

## 2021-07-13 RX ORDER — AMMONIUM LACTATE 12 G/100G
CREAM TOPICAL
Qty: 385 G | Refills: 0 | Status: SHIPPED | OUTPATIENT
Start: 2021-07-13 | End: 2021-08-10

## 2021-07-15 ENCOUNTER — OFFICE VISIT (OUTPATIENT)
Dept: FAMILY MEDICINE CLINIC | Facility: CLINIC | Age: 71
End: 2021-07-15
Payer: COMMERCIAL

## 2021-07-15 VITALS
RESPIRATION RATE: 20 BRPM | DIASTOLIC BLOOD PRESSURE: 78 MMHG | HEIGHT: 67 IN | OXYGEN SATURATION: 97 % | WEIGHT: 176.8 LBS | BODY MASS INDEX: 27.75 KG/M2 | SYSTOLIC BLOOD PRESSURE: 140 MMHG | TEMPERATURE: 97.5 F | HEART RATE: 63 BPM

## 2021-07-15 DIAGNOSIS — N18.30 TYPE 2 DIABETES MELLITUS WITH STAGE 3 CHRONIC KIDNEY DISEASE, WITH LONG-TERM CURRENT USE OF INSULIN, UNSPECIFIED WHETHER STAGE 3A OR 3B CKD (HCC): ICD-10-CM

## 2021-07-15 DIAGNOSIS — Z79.4 TYPE 2 DIABETES MELLITUS WITH STAGE 3 CHRONIC KIDNEY DISEASE, WITH LONG-TERM CURRENT USE OF INSULIN, UNSPECIFIED WHETHER STAGE 3A OR 3B CKD (HCC): ICD-10-CM

## 2021-07-15 DIAGNOSIS — E55.9 VITAMIN D DEFICIENCY: ICD-10-CM

## 2021-07-15 DIAGNOSIS — E11.42 DIABETIC POLYNEUROPATHY ASSOCIATED WITH TYPE 2 DIABETES MELLITUS (HCC): ICD-10-CM

## 2021-07-15 DIAGNOSIS — M25.511 RIGHT SHOULDER PAIN, UNSPECIFIED CHRONICITY: ICD-10-CM

## 2021-07-15 DIAGNOSIS — E11.65 TYPE 2 DIABETES MELLITUS WITH HYPERGLYCEMIA, WITH LONG-TERM CURRENT USE OF INSULIN (HCC): Primary | ICD-10-CM

## 2021-07-15 DIAGNOSIS — E11.22 TYPE 2 DIABETES MELLITUS WITH STAGE 3 CHRONIC KIDNEY DISEASE, WITH LONG-TERM CURRENT USE OF INSULIN, UNSPECIFIED WHETHER STAGE 3A OR 3B CKD (HCC): ICD-10-CM

## 2021-07-15 DIAGNOSIS — Z79.4 TYPE 2 DIABETES MELLITUS WITH HYPERGLYCEMIA, WITH LONG-TERM CURRENT USE OF INSULIN (HCC): Primary | ICD-10-CM

## 2021-07-15 DIAGNOSIS — N52.9 ERECTILE DYSFUNCTION, UNSPECIFIED ERECTILE DYSFUNCTION TYPE: ICD-10-CM

## 2021-07-15 DIAGNOSIS — Z12.11 SCREENING FOR COLON CANCER: ICD-10-CM

## 2021-07-15 LAB — SL AMB POCT HEMOGLOBIN AIC: 12.3 (ref ?–6.5)

## 2021-07-15 PROCEDURE — 3077F SYST BP >= 140 MM HG: CPT | Performed by: FAMILY MEDICINE

## 2021-07-15 PROCEDURE — 1036F TOBACCO NON-USER: CPT | Performed by: FAMILY MEDICINE

## 2021-07-15 PROCEDURE — 83036 HEMOGLOBIN GLYCOSYLATED A1C: CPT | Performed by: FAMILY MEDICINE

## 2021-07-15 PROCEDURE — 99214 OFFICE O/P EST MOD 30 MIN: CPT | Performed by: FAMILY MEDICINE

## 2021-07-15 PROCEDURE — 3078F DIAST BP <80 MM HG: CPT | Performed by: FAMILY MEDICINE

## 2021-07-15 PROCEDURE — 3066F NEPHROPATHY DOC TX: CPT | Performed by: FAMILY MEDICINE

## 2021-07-15 PROCEDURE — 1160F RVW MEDS BY RX/DR IN RCRD: CPT | Performed by: FAMILY MEDICINE

## 2021-07-15 PROCEDURE — 3008F BODY MASS INDEX DOCD: CPT | Performed by: FAMILY MEDICINE

## 2021-07-15 PROCEDURE — 3046F HEMOGLOBIN A1C LEVEL >9.0%: CPT | Performed by: FAMILY MEDICINE

## 2021-07-15 RX ORDER — TADALAFIL 10 MG/1
10 TABLET ORAL
COMMUNITY
Start: 2021-04-30

## 2021-07-15 RX ORDER — TERAZOSIN 10 MG/1
10 CAPSULE ORAL
COMMUNITY
Start: 2021-07-01 | End: 2022-07-01

## 2021-07-15 RX ORDER — FLASH GLUCOSE SENSOR
1 KIT MISCELLANEOUS
COMMUNITY
Start: 2021-07-01

## 2021-07-15 RX ORDER — ERGOCALCIFEROL 1.25 MG/1
CAPSULE ORAL
Qty: 8 CAPSULE | Refills: 0 | Status: SHIPPED | OUTPATIENT
Start: 2021-07-15 | End: 2021-10-19 | Stop reason: SDUPTHER

## 2021-07-15 NOTE — PROGRESS NOTES
Diabetic Foot Exam    Patient's shoes and socks removed  Right Foot/Ankle   Right Foot Inspection  Skin Exam: skin normal and skin intact no dry skin, no warmth, no callus, no erythema, no maceration, no abnormal color, no pre-ulcer, no ulcer and no callus                            Sensory   Vibration: intact  Proprioception: intact   Monofilament testing: intact  Vascular    The right DP pulse is 2+  The right PT pulse is 2+  Left Foot/Ankle  Left Foot Inspection  Skin Exam: skin normal and skin intactno dry skin, no warmth, no erythema, no maceration, normal color, no pre-ulcer, no ulcer and no callus                                         Sensory   Vibration: intact  Proprioception: intact  Monofilament: intact  Vascular    The left DP pulse is 2+  The left PT pulse is 2+  Assign Risk Category:  No deformity present; No loss of protective sensation;  No weak pulses       Risk: 0

## 2021-07-15 NOTE — PROGRESS NOTES
Assessment/Plan:    Type 2 diabetes mellitus with hyperglycemia, with long-term current use of insulin ([facility] [address] )  Unfortunately diabetes remains way out of control with today's hemoglobin A1c of 12 2  Urged him to try to avoid carbohydrates in the diet and he will continue follow-up with Endocrine  He was recently increased to NovoLog 70/30 45 units b i d  along withTrulicity 7 39 milligrams weekly and Jardiance 25 milligrams daily  He is scheduled to see Endocrinology September 2nd  Lab Results   Component Value Date    HGBA1C 12 2 (H) 04/30/2021       Diabetic polyneuropathy associated with type 2 diabetes mellitus ([address] )  He will continue gabapentin 100 milligrams t i d  Lab Results   Component Value Date    HGBA1C 12 2 (H) 04/30/2021       Vitamin D deficiency  Will give prescription ergocalciferol 49756 units to take weekly for the next 2 months  Diagnoses and all orders for this visit:    Type 2 diabetes mellitus with hyperglycemia, with long-term current use of insulin ([facility] [address] )  -     Ambulatory referral to Ophthalmology; Future    Screening for colon cancer  -     Ambulatory referral to Gastroenterology; Future    Diabetic polyneuropathy associated with type 2 diabetes mellitus (HCC)    Vitamin D deficiency    Other orders  -     terazosin (HYTRIN) 10 MG capsule; Take 10 mg by mouth  -     tadalafil (CIALIS) 10 MG tablet; Take 10 mg by mouth  -     Insulin Pen Needle 32G X 4 MM MISC; Use with flexpens BID  -     Continuous Blood Gluc Sensor (FreeStyle Lucas 14 Day Sensor) MISC; Use 1 each every 14 (fourteen) days  -     Glucose Blood ([employer] GLUCOSE METER TEST STRIPS VI); Use Three times a day          Subjective:      Patient ID: Thanh Boyle is a 65 y o  male  He is here today for follow-up  He denies any major problems with his blood sugar  He has been checking it regularly  There are some high readings but no hypoglycemia  He denies chest pain or shortness of breath    He is having some pain in the right upper back and shoulder  Sometimes pain radiates into arm and hand  No injury    He has some neuropathy in the feet  He does get some relief with gabapentin 100 milligrams t i d  He goes to endocrinology, Dr Mayra Gardiner  Former smoker      The following portions of the patient's history were reviewed and updated as appropriate: allergies, current medications, past family history, past medical history, past social history, past surgical history and problem list     Review of Systems   Constitutional: Negative for activity change, chills, fatigue and fever  HENT: Positive for hearing loss  Negative for congestion, ear pain, sinus pressure and sore throat  Eyes: Negative for pain and visual disturbance  Respiratory: Negative for cough, chest tightness, shortness of breath and wheezing  Cardiovascular: Negative for chest pain, palpitations and leg swelling  Gastrointestinal: Negative for abdominal pain, blood in stool, constipation, diarrhea, nausea and vomiting  Endocrine: Negative for polydipsia and polyuria  Genitourinary: Negative for difficulty urinating, dysuria, frequency and urgency  Musculoskeletal: Positive for arthralgias  Negative for joint swelling and myalgias  Skin: Negative for rash  Neurological: Negative for dizziness, weakness, numbness and headaches  Hematological: Negative for adenopathy  Does not bruise/bleed easily  Psychiatric/Behavioral: Negative for dysphoric mood  The patient is not nervous/anxious  Objective:      /78   Pulse 63   Temp 97 5 °F (36 4 °C) (Tympanic)   Resp 20   Ht 5' 7" (1 702 m)   Wt 80 2 kg (176 lb 12 8 oz)   SpO2 97%   BMI 27 69 kg/m²          Physical Exam  Vitals and nursing note reviewed  Constitutional:       Appearance: Normal appearance  HENT:      Head: Normocephalic and atraumatic        Mouth/Throat:      Mouth: Mucous membranes are moist    Cardiovascular:      Rate and Rhythm: Normal rate and regular rhythm  Pulses: Normal pulses  Heart sounds: Normal heart sounds  Pulmonary:      Effort: Pulmonary effort is normal       Breath sounds: Normal breath sounds  Musculoskeletal:      Comments: Right shoulder is without swelling or deformity  There is mild tenderness posteriorly  There is good range of motion but pain with abduction above shoulder level and flexion above shoulder level  Skin:     General: Skin is warm and dry  Neurological:      Mental Status: He is alert     Psychiatric:         Mood and Affect: Mood normal          Behavior: Behavior normal

## 2021-07-15 NOTE — ASSESSMENT & PLAN NOTE
Unfortunately diabetes remains way out of control with today's hemoglobin A1c of 12 2  Urged him to try to avoid carbohydrates in the diet and he will continue follow-up with Endocrine  He was recently increased to NovoLog 70/30 45 units b i d  along withTrulicity 8 82 milligrams weekly and Jardiance 25 milligrams daily  He is scheduled to see Endocrinology September 2nd    Lab Results   Component Value Date    HGBA1C 12 2 (H) 04/30/2021

## 2021-07-22 DIAGNOSIS — E11.42 DIABETIC POLYNEUROPATHY ASSOCIATED WITH TYPE 2 DIABETES MELLITUS (HCC): ICD-10-CM

## 2021-07-23 RX ORDER — GABAPENTIN 100 MG/1
CAPSULE ORAL
Qty: 90 CAPSULE | Refills: 2 | Status: SHIPPED | OUTPATIENT
Start: 2021-07-23 | End: 2021-10-06

## 2021-08-06 DIAGNOSIS — K21.9 GASTROESOPHAGEAL REFLUX DISEASE: ICD-10-CM

## 2021-08-06 RX ORDER — PANTOPRAZOLE SODIUM 40 MG/1
TABLET, DELAYED RELEASE ORAL
Qty: 30 TABLET | Refills: 4 | Status: SHIPPED | OUTPATIENT
Start: 2021-08-06

## 2021-08-10 DIAGNOSIS — L85.3 DRY SKIN DERMATITIS: ICD-10-CM

## 2021-08-10 RX ORDER — AMMONIUM LACTATE 12 G/100G
CREAM TOPICAL
Qty: 385 G | Refills: 0 | Status: SHIPPED | OUTPATIENT
Start: 2021-08-10

## 2021-09-27 DIAGNOSIS — Z79.4 TYPE 2 DIABETES MELLITUS WITH HYPERGLYCEMIA, WITH LONG-TERM CURRENT USE OF INSULIN (HCC): ICD-10-CM

## 2021-09-27 DIAGNOSIS — E11.65 TYPE 2 DIABETES MELLITUS WITH HYPERGLYCEMIA, WITH LONG-TERM CURRENT USE OF INSULIN (HCC): ICD-10-CM

## 2021-09-28 RX ORDER — BLOOD SUGAR DIAGNOSTIC
STRIP MISCELLANEOUS
Qty: 100 EACH | Refills: 10 | Status: SHIPPED | OUTPATIENT
Start: 2021-09-28 | End: 2022-07-20

## 2021-10-01 ENCOUNTER — VBI (OUTPATIENT)
Dept: ADMINISTRATIVE | Facility: OTHER | Age: 71
End: 2021-10-01

## 2021-10-05 DIAGNOSIS — E11.42 DIABETIC POLYNEUROPATHY ASSOCIATED WITH TYPE 2 DIABETES MELLITUS (HCC): ICD-10-CM

## 2021-10-06 RX ORDER — GABAPENTIN 100 MG/1
CAPSULE ORAL
Qty: 90 CAPSULE | Refills: 1 | Status: SHIPPED | OUTPATIENT
Start: 2021-10-06

## 2021-10-19 ENCOUNTER — OFFICE VISIT (OUTPATIENT)
Dept: FAMILY MEDICINE CLINIC | Facility: CLINIC | Age: 71
End: 2021-10-19
Payer: COMMERCIAL

## 2021-10-19 VITALS
HEIGHT: 67 IN | TEMPERATURE: 98.4 F | SYSTOLIC BLOOD PRESSURE: 148 MMHG | BODY MASS INDEX: 28.25 KG/M2 | WEIGHT: 180 LBS | HEART RATE: 73 BPM | DIASTOLIC BLOOD PRESSURE: 78 MMHG | OXYGEN SATURATION: 98 % | RESPIRATION RATE: 20 BRPM

## 2021-10-19 DIAGNOSIS — I10 ESSENTIAL HYPERTENSION: ICD-10-CM

## 2021-10-19 DIAGNOSIS — M54.41 CHRONIC MIDLINE LOW BACK PAIN WITH RIGHT-SIDED SCIATICA: ICD-10-CM

## 2021-10-19 DIAGNOSIS — Z23 NEED FOR INFLUENZA VACCINATION: ICD-10-CM

## 2021-10-19 DIAGNOSIS — Z79.4 TYPE 2 DIABETES MELLITUS WITH STAGE 3 CHRONIC KIDNEY DISEASE, WITH LONG-TERM CURRENT USE OF INSULIN, UNSPECIFIED WHETHER STAGE 3A OR 3B CKD (HCC): Primary | ICD-10-CM

## 2021-10-19 DIAGNOSIS — E55.9 VITAMIN D DEFICIENCY: ICD-10-CM

## 2021-10-19 DIAGNOSIS — E11.22 TYPE 2 DIABETES MELLITUS WITH STAGE 3 CHRONIC KIDNEY DISEASE, WITH LONG-TERM CURRENT USE OF INSULIN, UNSPECIFIED WHETHER STAGE 3A OR 3B CKD (HCC): Primary | ICD-10-CM

## 2021-10-19 DIAGNOSIS — G89.29 CHRONIC MIDLINE LOW BACK PAIN WITH RIGHT-SIDED SCIATICA: ICD-10-CM

## 2021-10-19 DIAGNOSIS — L30.9 DERMATITIS: ICD-10-CM

## 2021-10-19 DIAGNOSIS — N18.30 TYPE 2 DIABETES MELLITUS WITH STAGE 3 CHRONIC KIDNEY DISEASE, WITH LONG-TERM CURRENT USE OF INSULIN, UNSPECIFIED WHETHER STAGE 3A OR 3B CKD (HCC): Primary | ICD-10-CM

## 2021-10-19 DIAGNOSIS — E11.42 DIABETIC POLYNEUROPATHY ASSOCIATED WITH TYPE 2 DIABETES MELLITUS (HCC): ICD-10-CM

## 2021-10-19 LAB — SL AMB POCT HEMOGLOBIN AIC: 11.9 (ref ?–6.5)

## 2021-10-19 PROCEDURE — 99214 OFFICE O/P EST MOD 30 MIN: CPT | Performed by: FAMILY MEDICINE

## 2021-10-19 PROCEDURE — 3077F SYST BP >= 140 MM HG: CPT | Performed by: FAMILY MEDICINE

## 2021-10-19 PROCEDURE — 3078F DIAST BP <80 MM HG: CPT | Performed by: FAMILY MEDICINE

## 2021-10-19 PROCEDURE — 3066F NEPHROPATHY DOC TX: CPT | Performed by: FAMILY MEDICINE

## 2021-10-19 PROCEDURE — 3046F HEMOGLOBIN A1C LEVEL >9.0%: CPT | Performed by: FAMILY MEDICINE

## 2021-10-19 PROCEDURE — 1160F RVW MEDS BY RX/DR IN RCRD: CPT | Performed by: FAMILY MEDICINE

## 2021-10-19 PROCEDURE — 3008F BODY MASS INDEX DOCD: CPT | Performed by: FAMILY MEDICINE

## 2021-10-19 PROCEDURE — 1036F TOBACCO NON-USER: CPT | Performed by: FAMILY MEDICINE

## 2021-10-19 PROCEDURE — 90662 IIV NO PRSV INCREASED AG IM: CPT

## 2021-10-19 PROCEDURE — G0008 ADMIN INFLUENZA VIRUS VAC: HCPCS

## 2021-10-19 PROCEDURE — 83036 HEMOGLOBIN GLYCOSYLATED A1C: CPT | Performed by: FAMILY MEDICINE

## 2021-10-19 RX ORDER — ERGOCALCIFEROL 1.25 MG/1
CAPSULE ORAL
Qty: 8 CAPSULE | Refills: 1 | Status: SHIPPED | OUTPATIENT
Start: 2021-10-19

## 2021-10-19 RX ORDER — TRAMADOL HYDROCHLORIDE 50 MG/1
50 TABLET ORAL EVERY 6 HOURS PRN
Qty: 30 TABLET | Refills: 0 | Status: SHIPPED | OUTPATIENT
Start: 2021-10-19 | End: 2021-12-21 | Stop reason: SDUPTHER

## 2021-10-19 RX ORDER — TRIAMCINOLONE ACETONIDE 1 MG/G
CREAM TOPICAL 2 TIMES DAILY
Qty: 30 G | Refills: 0 | Status: SHIPPED | OUTPATIENT
Start: 2021-10-19 | End: 2021-11-02

## 2021-11-02 DIAGNOSIS — L30.9 DERMATITIS: ICD-10-CM

## 2021-11-02 RX ORDER — TRIAMCINOLONE ACETONIDE 1 MG/G
CREAM TOPICAL
Qty: 30 G | Refills: 0 | Status: SHIPPED | OUTPATIENT
Start: 2021-11-02 | End: 2021-11-16

## 2021-11-11 ENCOUNTER — VBI (OUTPATIENT)
Dept: ADMINISTRATIVE | Facility: OTHER | Age: 71
End: 2021-11-11

## 2021-11-16 DIAGNOSIS — L30.9 DERMATITIS: ICD-10-CM

## 2021-11-16 RX ORDER — TRIAMCINOLONE ACETONIDE 1 MG/G
CREAM TOPICAL
Qty: 30 G | Refills: 0 | Status: SHIPPED | OUTPATIENT
Start: 2021-11-16 | End: 2022-07-20 | Stop reason: SDUPTHER

## 2021-12-21 DIAGNOSIS — M54.41 CHRONIC MIDLINE LOW BACK PAIN WITH RIGHT-SIDED SCIATICA: ICD-10-CM

## 2021-12-21 DIAGNOSIS — G89.29 CHRONIC MIDLINE LOW BACK PAIN WITH RIGHT-SIDED SCIATICA: ICD-10-CM

## 2021-12-21 RX ORDER — TRAMADOL HYDROCHLORIDE 50 MG/1
50 TABLET ORAL EVERY 6 HOURS PRN
Qty: 30 TABLET | Refills: 0 | Status: SHIPPED | OUTPATIENT
Start: 2021-12-21 | End: 2022-01-19 | Stop reason: SDUPTHER

## 2022-01-19 ENCOUNTER — OFFICE VISIT (OUTPATIENT)
Dept: FAMILY MEDICINE CLINIC | Facility: CLINIC | Age: 72
End: 2022-01-19
Payer: MEDICARE

## 2022-01-19 VITALS
WEIGHT: 171 LBS | HEIGHT: 67 IN | HEART RATE: 67 BPM | RESPIRATION RATE: 18 BRPM | BODY MASS INDEX: 26.84 KG/M2 | SYSTOLIC BLOOD PRESSURE: 130 MMHG | TEMPERATURE: 97.6 F | DIASTOLIC BLOOD PRESSURE: 60 MMHG | OXYGEN SATURATION: 98 %

## 2022-01-19 DIAGNOSIS — E11.22 TYPE 2 DIABETES MELLITUS WITH STAGE 3 CHRONIC KIDNEY DISEASE, WITH LONG-TERM CURRENT USE OF INSULIN, UNSPECIFIED WHETHER STAGE 3A OR 3B CKD (HCC): Primary | ICD-10-CM

## 2022-01-19 DIAGNOSIS — I10 ESSENTIAL HYPERTENSION: ICD-10-CM

## 2022-01-19 DIAGNOSIS — M54.41 CHRONIC MIDLINE LOW BACK PAIN WITH RIGHT-SIDED SCIATICA: ICD-10-CM

## 2022-01-19 DIAGNOSIS — E11.42 DIABETIC POLYNEUROPATHY ASSOCIATED WITH TYPE 2 DIABETES MELLITUS (HCC): ICD-10-CM

## 2022-01-19 DIAGNOSIS — G89.29 CHRONIC MIDLINE LOW BACK PAIN WITH RIGHT-SIDED SCIATICA: ICD-10-CM

## 2022-01-19 DIAGNOSIS — E55.9 VITAMIN D DEFICIENCY: ICD-10-CM

## 2022-01-19 DIAGNOSIS — D64.9 ANEMIA, UNSPECIFIED TYPE: ICD-10-CM

## 2022-01-19 DIAGNOSIS — Z79.4 TYPE 2 DIABETES MELLITUS WITH STAGE 3 CHRONIC KIDNEY DISEASE, WITH LONG-TERM CURRENT USE OF INSULIN, UNSPECIFIED WHETHER STAGE 3A OR 3B CKD (HCC): Primary | ICD-10-CM

## 2022-01-19 DIAGNOSIS — N18.30 TYPE 2 DIABETES MELLITUS WITH STAGE 3 CHRONIC KIDNEY DISEASE, WITH LONG-TERM CURRENT USE OF INSULIN, UNSPECIFIED WHETHER STAGE 3A OR 3B CKD (HCC): Primary | ICD-10-CM

## 2022-01-19 DIAGNOSIS — E78.2 MIXED HYPERLIPIDEMIA: ICD-10-CM

## 2022-01-19 LAB
LEFT EYE DIABETIC RETINOPATHY: ABNORMAL
LEFT EYE IMAGE QUALITY: ABNORMAL
LEFT EYE MACULAR EDEMA: POSITIVE
LEFT EYE OTHER RETINOPATHY: ABNORMAL
RIGHT EYE DIABETIC RETINOPATHY: ABNORMAL
RIGHT EYE IMAGE QUALITY: ABNORMAL
RIGHT EYE MACULAR EDEMA: ABNORMAL
RIGHT EYE OTHER RETINOPATHY: ABNORMAL
SEVERITY (EYE EXAM): ABNORMAL
SL AMB POCT HEMOGLOBIN AIC: 12.9 (ref ?–6.5)

## 2022-01-19 PROCEDURE — 99214 OFFICE O/P EST MOD 30 MIN: CPT | Performed by: FAMILY MEDICINE

## 2022-01-19 PROCEDURE — 92250 FUNDUS PHOTOGRAPHY W/I&R: CPT | Performed by: FAMILY MEDICINE

## 2022-01-19 PROCEDURE — 83036 HEMOGLOBIN GLYCOSYLATED A1C: CPT | Performed by: FAMILY MEDICINE

## 2022-01-19 RX ORDER — TRAMADOL HYDROCHLORIDE 50 MG/1
50 TABLET ORAL EVERY 6 HOURS PRN
Qty: 30 TABLET | Refills: 0 | Status: SHIPPED | OUTPATIENT
Start: 2022-01-19 | End: 2022-04-20 | Stop reason: SDUPTHER

## 2022-01-19 RX ORDER — TERAZOSIN 5 MG/1
CAPSULE ORAL
COMMUNITY
Start: 2021-12-03 | End: 2022-04-20 | Stop reason: DRUGHIGH

## 2022-01-19 NOTE — ASSESSMENT & PLAN NOTE
Lipids remain quite elevated on atorvastatin 80 mg daily    He will be due for fasting lipid profile in April

## 2022-01-19 NOTE — PROGRESS NOTES
Assessment/Plan:    Type 2 diabetes mellitus with stage 3 chronic kidney disease, with long-term current use of insulin, unspecified whether stage 3a or 3b CKD ([facility])    Unfortunately hemoglobin A1c has worsened due to recent illness  I have urged him to try to get back on his regimen and he will continue follow-up with Dr Lucille Vargas  Lab Results   Component Value Date    HGBA1C 11 9 (A) 10/19/2021       Diabetic polyneuropathy associated with type 2 diabetes mellitus ([facility] [address] )  Stable on gabapentin  Lab Results   Component Value Date    HGBA1C 11 9 (A) 10/19/2021       Essential hypertension   Blood pressure stable on losartan -HCTZ 100-12 5 mg daily  Chronic midline low back pain with right-sided sciatica    He tries to keep tramadol to a maximum of 1 per day  Some days he goes without  Recommended use of topicals such as Aspercreme with lidocaine  Mixed hyperlipidemia  Lipids remain quite elevated on atorvastatin 80 mg daily  He will be due for fasting lipid profile in April Diagnoses and all orders for this visit:    Type 2 diabetes mellitus with stage 3 chronic kidney disease, with long-term current use of insulin, unspecified whether stage 3a or 3b CKD ([facility] [address] )  -     IRIS Diabetic eye exam  -     POCT hemoglobin A1c    Chronic midline low back pain with right-sided sciatica    Diabetic polyneuropathy associated with type 2 diabetes mellitus ([facility] [address] )    Essential hypertension    Mixed hyperlipidemia    Other orders  -     terazosin (HYTRIN) 5 mg capsule          Subjective:      Patient ID: Trino Cisneros is a 66 y o  male  He is here for follow-up visit  Unfortunately he was very sick about 2 weeks ago with probable COVID  He did not get tested  He had high fever and was not able to eat for several days  He was not using his diabetes medications because he was not eating  He finally was able to start eating about a week ago and now he is feeling much better      He had mild coughing and occasional mucus  He used Theraflu which helped  He denies headaches but he had some dizziness  He continues to complain of neuropathy in the feet which is bilateral but worse on the left side  The feet feel swollen all the time even though they are not swollen in appearance  He takes gabapentin 3 times per day  He denies CP or SOB      He has practically no hearing on the left side  Has decreased hearing on the right  He has been to ENT and has hearing aids but he does not use them because they make everything too noisy  The following portions of the patient's history were reviewed and updated as appropriate: allergies, current medications, past family history, past medical history, past social history, past surgical history and problem list     Review of Systems   Constitutional: Negative for activity change, chills, fatigue and fever  HENT: Positive for hearing loss  Negative for congestion, ear pain, sinus pressure and sore throat  Eyes: Negative for pain and visual disturbance  Respiratory: Negative for cough, chest tightness, shortness of breath and wheezing  Cardiovascular: Negative for chest pain, palpitations and leg swelling  Gastrointestinal: Negative for abdominal pain, blood in stool, constipation, diarrhea, nausea and vomiting  Endocrine: Negative for polydipsia and polyuria  Genitourinary: Negative for difficulty urinating, dysuria, frequency and urgency  Musculoskeletal: Negative for arthralgias, joint swelling and myalgias  Skin: Negative for rash  Neurological: Positive for dizziness  Negative for weakness, numbness and headaches  Hematological: Negative for adenopathy  Does not bruise/bleed easily  Psychiatric/Behavioral: Negative for dysphoric mood  The patient is not nervous/anxious            Objective:      /60 (BP Location: Left arm, Patient Position: Sitting, Cuff Size: Adult)   Pulse 67   Temp 97 6 °F (36 4 °C) (Tympanic)   Resp 18  5' 7" (1 702 m)   Wt 77 6 kg (171 lb)   SpO2 98%   BMI 26 78 kg/m²          Physical Exam  Vitals and nursing note reviewed  Constitutional:       Appearance: Normal appearance  HENT:      Head: Normocephalic and atraumatic  Right Ear: Ear canal and external ear normal       Left Ear: Ear canal and external ear normal       Ears:      Comments: Right TM is opaque in appearance without normal landmarks  Left TM is normal      Mouth/Throat:      Mouth: Mucous membranes are moist    Cardiovascular:      Rate and Rhythm: Normal rate and regular rhythm  Pulses: Normal pulses  Heart sounds: Normal heart sounds  Pulmonary:      Effort: Pulmonary effort is normal       Breath sounds: Normal breath sounds  Musculoskeletal:      Right lower leg: No edema  Left lower leg: No edema  Skin:     General: Skin is warm and dry  Neurological:      Mental Status: He is alert     Psychiatric:         Mood and Affect: Mood normal

## 2022-01-19 NOTE — ASSESSMENT & PLAN NOTE
He tries to keep tramadol to a maximum of 1 per day  Some days he goes without  Recommended use of topicals such as Aspercreme with lidocaine

## 2022-01-19 NOTE — ASSESSMENT & PLAN NOTE
Unfortunately hemoglobin A1c has worsened due to recent illness    I have urged him to try to get back on his regimen and he will continue follow-up with Dr Gavi Blanco  Lab Results   Component Value Date    HGBA1C 11 9 (A) 10/19/2021

## 2022-04-20 ENCOUNTER — OFFICE VISIT (OUTPATIENT)
Dept: FAMILY MEDICINE CLINIC | Facility: CLINIC | Age: 72
End: 2022-04-20
Payer: MEDICARE

## 2022-04-20 VITALS
TEMPERATURE: 97.1 F | WEIGHT: 174.4 LBS | SYSTOLIC BLOOD PRESSURE: 148 MMHG | BODY MASS INDEX: 27.37 KG/M2 | RESPIRATION RATE: 18 BRPM | DIASTOLIC BLOOD PRESSURE: 72 MMHG | OXYGEN SATURATION: 98 % | HEART RATE: 70 BPM | HEIGHT: 67 IN

## 2022-04-20 DIAGNOSIS — E11.22 TYPE 2 DIABETES MELLITUS WITH STAGE 3 CHRONIC KIDNEY DISEASE, WITH LONG-TERM CURRENT USE OF INSULIN, UNSPECIFIED WHETHER STAGE 3A OR 3B CKD (HCC): Primary | ICD-10-CM

## 2022-04-20 DIAGNOSIS — E55.9 VITAMIN D DEFICIENCY: ICD-10-CM

## 2022-04-20 DIAGNOSIS — Z79.4 TYPE 2 DIABETES MELLITUS WITH STAGE 3 CHRONIC KIDNEY DISEASE, WITH LONG-TERM CURRENT USE OF INSULIN, UNSPECIFIED WHETHER STAGE 3A OR 3B CKD (HCC): Primary | ICD-10-CM

## 2022-04-20 DIAGNOSIS — G89.29 CHRONIC MIDLINE LOW BACK PAIN WITH RIGHT-SIDED SCIATICA: ICD-10-CM

## 2022-04-20 DIAGNOSIS — M54.41 CHRONIC MIDLINE LOW BACK PAIN WITH RIGHT-SIDED SCIATICA: ICD-10-CM

## 2022-04-20 DIAGNOSIS — R53.83 FATIGUE, UNSPECIFIED TYPE: ICD-10-CM

## 2022-04-20 DIAGNOSIS — N18.30 TYPE 2 DIABETES MELLITUS WITH STAGE 3 CHRONIC KIDNEY DISEASE, WITH LONG-TERM CURRENT USE OF INSULIN, UNSPECIFIED WHETHER STAGE 3A OR 3B CKD (HCC): Primary | ICD-10-CM

## 2022-04-20 DIAGNOSIS — I10 ESSENTIAL HYPERTENSION: ICD-10-CM

## 2022-04-20 DIAGNOSIS — E78.2 MIXED HYPERLIPIDEMIA: ICD-10-CM

## 2022-04-20 LAB — SL AMB POCT HEMOGLOBIN AIC: 11.9 (ref ?–6.5)

## 2022-04-20 PROCEDURE — G0439 PPPS, SUBSEQ VISIT: HCPCS | Performed by: FAMILY MEDICINE

## 2022-04-20 PROCEDURE — 83036 HEMOGLOBIN GLYCOSYLATED A1C: CPT | Performed by: FAMILY MEDICINE

## 2022-04-20 RX ORDER — TRAMADOL HYDROCHLORIDE 50 MG/1
50 TABLET ORAL EVERY 6 HOURS PRN
Qty: 30 TABLET | Refills: 0 | Status: SHIPPED | OUTPATIENT
Start: 2022-04-20 | End: 2022-07-20 | Stop reason: SDUPTHER

## 2022-04-20 NOTE — PROGRESS NOTES
Assessment and Plan:     Problem List Items Addressed This Visit        Endocrine    Type 2 diabetes mellitus with stage 3 chronic kidney disease, with long-term current use of insulin, unspecified whether stage 3a or 3b CKD ([facility] [address] ) - Primary    Relevant Orders    POCT hemoglobin A1c       Nervous and Auditory    Chronic midline low back pain with right-sided sciatica        BMI Counseling: Body mass index is 27 31 kg/m²  The BMI is above normal  Nutrition recommendations include encouraging healthy choices of fruits and vegetables  Exercise recommendations include exercising 3-5 times per week  No pharmacotherapy was ordered  Rationale for BMI follow-up plan is due to patient being overweight or obese  Preventive health issues were discussed with patient, and age appropriate screening tests were ordered as noted in patient's After Visit Summary  Personalized health advice and appropriate referrals for health education or preventive services given if needed, as noted in patient's After Visit Summary       History of Present Illness:     Patient presents for Medicare Annual Wellness visit    Patient Care Team:  Kamari Steward MD as PCP - General (Family Medicine)  [facility] (Podiatry)     Problem List:     Patient Active Problem List   Diagnosis    Mixed hyperlipidemia    Gastroesophageal reflux disease    Type 2 diabetes mellitus with stage 3 chronic kidney disease, with long-term current use of insulin, unspecified whether stage 3a or 3b CKD ([facility] [address] )    Essential hypertension    Right lower quadrant abdominal pain    Chronic midline low back pain with right-sided sciatica    Benign non-nodular prostatic hyperplasia without lower urinary tract symptoms    Left varicocele    Microhematuria    CKD (chronic kidney disease) stage 2, GFR 60-89 ml/min    Wellness examination    Screening for AAA (abdominal aortic aneurysm)    Encounter for screening colonoscopy    Encounter for screening for lung cancer    Hearing deficit, left    Overweight with body mass index (BMI) of 29 to 29 9 in adult    Diabetic polyneuropathy associated with type 2 diabetes mellitus ([place] [address] )    Encounter for immunization    Erectile dysfunction    Vitamin D deficiency      Past Medical and Surgical History:     Past Medical History:   Diagnosis Date    Diabetes mellitus ([facility] )      Past Surgical History:   Procedure Laterality Date    APPENDECTOMY Right 1970    PARAMEDIAN INCISION      Family History:     History reviewed  No pertinent family history     Social History:     Social History     Socioeconomic History    Marital status: /Civil Union     Spouse name: None    Number of children: None    Years of education: None    Highest education level: None   Occupational History    None   Tobacco Use    Smoking status: Former Smoker     Types: Cigarettes    Smokeless tobacco: Former User    Tobacco comment: Quit 2001   Substance and Sexual Activity    Alcohol use: Never    Drug use: Never    Sexual activity: None   Other Topics Concern    None   Social History Narrative    None     Social Determinants of Health     Financial Resource Strain: Not on file   Food Insecurity: Not on file   Transportation Needs: Not on file   Physical Activity: Not on file   Stress: Not on file   Social Connections: Not on file   Intimate Partner Violence: Not on file   Housing Stability: Not on file      Medications and Allergies:     Current Outpatient Medications   Medication Sig Dispense Refill    albuterol (PROVENTIL HFA,VENTOLIN HFA) 90 mcg/act inhaler INHALE 1 PUFF BY MOUTH EVERY SIX HOURS AS NEEDED FOR SHORTNESS OF BREATH 18 g 2    Alcohol Swabs (Alcohol Pads) 70 % PADS USE TO CLEAN THE AREAS BEFORE TESTING BLOOD SUGAR OR/AND INJECTING INSULIN ONCE DAILY 100 each 10    amLODIPine (NORVASC) 10 mg tablet Take 1 tablet (10 mg total) by mouth daily 30 tablet 3    ammonium lactate (LAC-HYDRIN) 12 % cream APPLY TOPICALLY TO AFFECTED AREA ONCE DAILY AS NEEDED FOR DRY SKIN 385 g 0    aspirin (ECOTRIN LOW STRENGTH) 81 mg EC tablet Take 81 mg by mouth daily      Aspirin Low Dose 81 MG EC tablet TAKE ONE TABLET BY MOUTH DAILY 30 tablet 11    atorvastatin (LIPITOR) 80 mg tablet Take 80 mg by mouth daily with dinner      Continuous Blood Gluc Sensor (FreeStyle Lucas 14 Day Sensor) MISC       Continuous Blood Gluc Sensor (FreeStyle Lucas 14 Day Sensor) MISC Use 1 each every 14 (fourteen) days      Dulaglutide (Trulicity) 4 03 DA/1 3MD SOPN Inject 0 75 mg under the skin once a week      EASY COMFORT PEN NEEDLES 32G X 4 MM MISC       Empagliflozin 25 MG TABS Take 25 mg by mouth      ergocalciferol (VITAMIN D2) 50,000 units Take 1 capsule weekly x8 weeks, then 2000 units p o  Daily   8 capsule 1    ezetimibe (ZETIA) 10 mg tablet Take 10 mg by mouth daily      gabapentin (NEURONTIN) 100 mg capsule TAKE ONE CAPSULE BY MOUTH THREE TIMES A DAY 90 capsule 1    Glucose Blood ([employer]S GLUCOSE METER TEST STRIPS VI) Use Three times a day      glucose blood (FREESTYLE LITE) test strip 1 each by Other route 4 (four) times a day 100 each 11    glucose blood test strip Use Three times a day      ibuprofen (MOTRIN) 800 mg tablet       insulin aspart protamine-insulin aspart (NovoLOG Mix 70/30 FlexPen) 100 Units/mL injection pen Inject 40 units subcutaneously twice a day before meals      Insulin Pen Needle 32G X 4 MM MISC Use with flexpens BID      JANUVIA 25 MG tablet Take 25 mg by mouth daily Take 1 tablet (25 mg) per mouth once daily      losartan (COZAAR) 100 MG tablet Take 100 mg by mouth daily      losartan-hydrochlorothiazide (HYZAAR) 100-12 5 MG per tablet TAKE ONE TABLET BY MOUTH ONCE DAILY 30 tablet 4    metFORMIN (GLUCOPHAGE) 1000 MG tablet TAKE ONE TABLET BY MOUTH TWICE A DAY WITH MEALS 60 tablet 0    pantoprazole (PROTONIX) 40 mg tablet TAKE ONE TABLET BY MOUTH ONCE DAILY 30 tablet 4    SURE COMFORT LANCETS 30G MISC use as directed      tadalafil (CIALIS) 10 MG tablet Take 10 mg by mouth      terazosin (HYTRIN) 10 MG capsule Take 10 mg by mouth      terazosin (HYTRIN) 2 mg capsule Take 2 mg by mouth      terazosin (HYTRIN) 5 mg capsule       traMADol (ULTRAM) 50 mg tablet Take 1 tablet (50 mg total) by mouth every 6 (six) hours as needed for moderate pain 30 tablet 0    triamcinolone (KENALOG) 0 1 % cream APPLY TOPICALLY TO AFFECTED AREA TWICE A DAY 30 g 0     No current facility-administered medications for this visit  Allergies   Allergen Reactions    Iodinated Diagnostic Agents Other (See Comments)      Immunizations:     Immunization History   Administered Date(s) Administered    COVID-19 J&J ([employer]) vaccine 0 5 mL 04/09/2021    INFLUENZA 10/23/2008, 10/12/2009, 11/12/2010, 11/02/2011, 09/19/2012, 11/15/2013, 10/29/2014, 11/02/2015, 10/20/2016    Influenza, high dose seasonal 0 7 mL 02/04/2019, 11/25/2019, 01/22/2021, 10/19/2021    Pneumococcal Conjugate 13-Valent 10/20/2016    Pneumococcal Polysaccharide PPV23 02/20/2009    Tdap 02/20/2009, 01/22/2021      Health Maintenance:         Topic Date Due    Colorectal Cancer Screening  Never done    Hepatitis C Screening  Completed     There are no preventive care reminders to display for this patient  Medicare Health Risk Assessment:     There were no vitals taken for this visit  Koki Haider is here for his Subsequent Wellness visit  Health Risk Assessment:   Patient rates overall health as fair  Patient feels that their physical health rating is much better  Patient is satisfied with their life  Eyesight was rated as same  Hearing was rated as same  Patient feels that their emotional and mental health rating is same  Patients states they are never, rarely angry  Patient states they are never, rarely unusually tired/fatigued  Pain experienced in the last 7 days has been some  Patient's pain rating has been 8/10   Patient states that he has experienced no weight loss or gain in last 6 months  Fall Risk Screening: In the past year, patient has experienced: no history of falling in past year      Home Safety:  Patient has trouble with stairs inside or outside of their home  Patient has working smoke alarms and has working carbon monoxide detector  Home safety hazards include: none  Nutrition:   Current diet is Regular  Medications:   Patient is currently taking over-the-counter supplements  OTC medications include: see medication list  Patient is able to manage medications  Activities of Daily Living (ADLs)/Instrumental Activities of Daily Living (IADLs):   Walk and transfer into and out of bed and chair?: Yes  Dress and groom yourself?: Yes    Bathe or shower yourself?: Yes    Feed yourself? Yes  Do your laundry/housekeeping?: Yes  Manage your money, pay your bills and track your expenses?: Yes  Make your own meals?: Yes    Do your own shopping?: Yes    Previous Hospitalizations:   Any hospitalizations or ED visits within the last 12 months?: No      Advance Care Planning:   Living will: Yes    Durable POA for healthcare: Yes    Advanced directive: Yes    Five wishes given: Yes      PREVENTIVE SCREENINGS      Cardiovascular Screening:    General: Screening Not Indicated and History Lipid Disorder      Diabetes Screening:     General: Screening Not Indicated and History Diabetes      Abdominal Aortic Aneurysm (AAA) Screening:    Risk factors include: age between 73-69 yo and tobacco use        Lung Cancer Screening:     General: Screening Not Indicated      Hepatitis C Screening:    General: Screening Current    Screening, Brief Intervention, and Referral to Treatment (SBIRT)    Screening  Typical number of drinks in a day: 0  Typical number of drinks in a week: 0  Interpretation: Low risk drinking behavior      AUDIT-C Screenin) How often did you have a drink containing alcohol in the past year? never  2) How many drinks did you have on a typical day when you were drinking in the past year? 0  3) How often did you have 6 or more drinks on one occasion in the past year? never    AUDIT-C Score: 0  Interpretation: Score 0-3 (male): Negative screen for alcohol misuse      Monik Zavala MD

## 2022-04-20 NOTE — ASSESSMENT & PLAN NOTE
Lab Results   Component Value Date    HGBA1C 11 9 (A) 04/20/2022     Although hemoglobin A1c is a little better, diabetes remains way out of control  Urged him to try to improve his diet and he will continue follow-up with endocrinology as well

## 2022-04-20 NOTE — PROGRESS NOTES
Assessment/Plan:    Gastroesophageal reflux disease  He is stable on Protonix 40 mg daily  Chronic midline low back pain with right-sided sciatica  He has been stable  I urged him to do some low stretching and he will continue with occasional tramadol  Essential hypertension  Systolic blood pressure is mildly elevated today  Several BP meds were list as duplicates  I called his pharmacy and verified current list   He is taking amlodipine 10 mg and terazosin 10 mg daily  He is not taking losartan-hctz    Type 2 diabetes mellitus with stage 3 chronic kidney disease, with long-term current use of insulin, unspecified whether stage 3a or 3b CKD ([facility] )    Lab Results   Component Value Date    HGBA1C 11 9 (A) 04/20/2022     Although hemoglobin A1c is a little better, diabetes remains way out of control  Urged him to try to improve his diet and he will continue follow-up with endocrinology as well  Diagnoses and all orders for this visit:    Type 2 diabetes mellitus with stage 3 chronic kidney disease, with long-term current use of insulin, unspecified whether stage 3a or 3b CKD ([place] [address] )  -     POCT hemoglobin A1c  -     Comprehensive metabolic panel; Future  -     Microalbumin / creatinine urine ratio    Chronic midline low back pain with right-sided sciatica  -     traMADol (ULTRAM) 50 mg tablet; Take 1 tablet (50 mg total) by mouth every 6 (six) hours as needed for moderate pain    Essential hypertension    Mixed hyperlipidemia  -     Lipid panel; Future    Fatigue, unspecified type  -     CBC and differential; Future    Vitamin D deficiency  -     Vitamin D 25 hydroxy; Future          Subjective:      Patient ID: Gildardo Johnson is a 66 y o  male  He is here with his wife for AWV and follow up  His main complaint is ongoing back pain sometimes with sciatic pains down the legs to the knee level  He manages with Tramadol    He has been to pain management but not recently    No CP, SOB or palpitations  He does note excessive thirst and urination but denies any symptoms of hypoglycemia  Follows with [facility]  He usually follows every 3 months but he missed his appointment when he was in [place]  The following portions of the patient's history were reviewed and updated as appropriate: allergies, current medications, past family history, past medical history, past social history, past surgical history and problem list     Review of Systems   Constitutional: Negative for activity change, chills, fatigue and fever  HENT: Negative for congestion, ear pain, sinus pressure and sore throat  Eyes: Negative for pain and visual disturbance  Respiratory: Negative for cough, chest tightness, shortness of breath and wheezing  Cardiovascular: Negative for chest pain, palpitations and leg swelling  Gastrointestinal: Negative for abdominal pain, blood in stool, constipation, diarrhea, nausea and vomiting  Endocrine: Negative for polydipsia and polyuria  Genitourinary: Negative for difficulty urinating, dysuria, frequency and urgency  Musculoskeletal: Positive for arthralgias and back pain  Negative for joint swelling and myalgias  Skin: Negative for rash  Neurological: Negative for dizziness, weakness, numbness and headaches  Hematological: Negative for adenopathy  Does not bruise/bleed easily  Psychiatric/Behavioral: Negative for dysphoric mood  The patient is not nervous/anxious  Objective:      /72   Pulse 70   Temp (!) 97 1 °F (36 2 °C) (Tympanic)   Resp 18   Ht 5' 7" (1 702 m)   Wt 79 1 kg (174 lb 6 4 oz)   SpO2 98%   BMI 27 31 kg/m²          Physical Exam  Vitals and nursing note reviewed  Constitutional:       General: He is not in acute distress  Appearance: Normal appearance  He is well-developed and normal weight  HENT:      Head: Normocephalic and atraumatic        Right Ear: External ear normal       Left Ear: External ear normal       Nose: Nose normal    Eyes:      General: No scleral icterus  Conjunctiva/sclera: Conjunctivae normal       Pupils: Pupils are equal, round, and reactive to light  Neck:      Thyroid: No thyromegaly  Cardiovascular:      Rate and Rhythm: Normal rate and regular rhythm  Heart sounds: Normal heart sounds  No murmur heard  Pulmonary:      Effort: Pulmonary effort is normal       Breath sounds: Normal breath sounds  No wheezing or rales  Abdominal:      General: Bowel sounds are normal       Palpations: Abdomen is soft  There is no mass  Tenderness: There is no abdominal tenderness  Musculoskeletal:         General: Tenderness present  No deformity  Cervical back: Normal range of motion and neck supple  Right lower leg: No edema  Left lower leg: No edema  Comments: Mild tenderness LS spine and paraspinal muscles   Lymphadenopathy:      Cervical: No cervical adenopathy  Skin:     General: Skin is warm and dry  Findings: No erythema or rash  Neurological:      Mental Status: He is alert and oriented to person, place, and time  Cranial Nerves: No cranial nerve deficit  Deep Tendon Reflexes: Reflexes are normal and symmetric     Psychiatric:         Mood and Affect: Mood normal          Behavior: Behavior normal

## 2022-04-20 NOTE — ASSESSMENT & PLAN NOTE
Systolic blood pressure is mildly elevated today  Several BP meds were list as duplicates  I called his pharmacy and verified current list   He is taking amlodipine 10 mg and terazosin 10 mg daily    He is not taking losartan-hctz

## 2022-04-20 NOTE — ASSESSMENT & PLAN NOTE
He has been stable  I urged him to do some low stretching and he will continue with occasional tramadol

## 2022-04-21 NOTE — ASSESSMENT & PLAN NOTE
Not control, blood pressure 158/74  Given the patient's current age and risk factors, blood pressure goal of less than 140/90  Patient does not know what medications he is supposed to be on, did advised patient to go home and call our office with a complete list of medications  Will need to adjust his medications to obtain better control of his blood pressure once the list is available General

## 2022-07-19 DIAGNOSIS — E11.65 TYPE 2 DIABETES MELLITUS WITH HYPERGLYCEMIA, WITH LONG-TERM CURRENT USE OF INSULIN (HCC): ICD-10-CM

## 2022-07-19 DIAGNOSIS — Z79.4 TYPE 2 DIABETES MELLITUS WITH HYPERGLYCEMIA, WITH LONG-TERM CURRENT USE OF INSULIN (HCC): ICD-10-CM

## 2022-07-20 ENCOUNTER — OFFICE VISIT (OUTPATIENT)
Dept: FAMILY MEDICINE CLINIC | Facility: CLINIC | Age: 72
End: 2022-07-20
Payer: MEDICARE

## 2022-07-20 VITALS
WEIGHT: 176 LBS | HEIGHT: 67 IN | HEART RATE: 65 BPM | SYSTOLIC BLOOD PRESSURE: 160 MMHG | OXYGEN SATURATION: 97 % | DIASTOLIC BLOOD PRESSURE: 70 MMHG | RESPIRATION RATE: 12 BRPM | BODY MASS INDEX: 27.62 KG/M2

## 2022-07-20 DIAGNOSIS — L30.9 DERMATITIS: ICD-10-CM

## 2022-07-20 DIAGNOSIS — E11.22 TYPE 2 DIABETES MELLITUS WITH STAGE 3 CHRONIC KIDNEY DISEASE, WITH LONG-TERM CURRENT USE OF INSULIN, UNSPECIFIED WHETHER STAGE 3A OR 3B CKD (HCC): Primary | ICD-10-CM

## 2022-07-20 DIAGNOSIS — E78.2 MIXED HYPERLIPIDEMIA: ICD-10-CM

## 2022-07-20 DIAGNOSIS — N18.30 TYPE 2 DIABETES MELLITUS WITH STAGE 3 CHRONIC KIDNEY DISEASE, WITH LONG-TERM CURRENT USE OF INSULIN, UNSPECIFIED WHETHER STAGE 3A OR 3B CKD (HCC): Primary | ICD-10-CM

## 2022-07-20 DIAGNOSIS — R06.02 SHORTNESS OF BREATH: ICD-10-CM

## 2022-07-20 DIAGNOSIS — I10 ESSENTIAL HYPERTENSION: ICD-10-CM

## 2022-07-20 DIAGNOSIS — Z79.4 TYPE 2 DIABETES MELLITUS WITH STAGE 3 CHRONIC KIDNEY DISEASE, WITH LONG-TERM CURRENT USE OF INSULIN, UNSPECIFIED WHETHER STAGE 3A OR 3B CKD (HCC): Primary | ICD-10-CM

## 2022-07-20 DIAGNOSIS — M54.41 CHRONIC MIDLINE LOW BACK PAIN WITH RIGHT-SIDED SCIATICA: ICD-10-CM

## 2022-07-20 DIAGNOSIS — G89.29 CHRONIC MIDLINE LOW BACK PAIN WITH RIGHT-SIDED SCIATICA: ICD-10-CM

## 2022-07-20 LAB — SL AMB POCT HEMOGLOBIN AIC: 11.9 (ref ?–6.5)

## 2022-07-20 PROCEDURE — 99214 OFFICE O/P EST MOD 30 MIN: CPT | Performed by: INTERNAL MEDICINE

## 2022-07-20 PROCEDURE — 83036 HEMOGLOBIN GLYCOSYLATED A1C: CPT | Performed by: INTERNAL MEDICINE

## 2022-07-20 RX ORDER — ALBUTEROL SULFATE 90 UG/1
2 AEROSOL, METERED RESPIRATORY (INHALATION) EVERY 6 HOURS PRN
Qty: 18 G | Refills: 2 | Status: SHIPPED | OUTPATIENT
Start: 2022-07-20 | End: 2022-09-19

## 2022-07-20 RX ORDER — TRAMADOL HYDROCHLORIDE 50 MG/1
50 TABLET ORAL EVERY 12 HOURS PRN
Qty: 60 TABLET | Refills: 0 | Status: SHIPPED | OUTPATIENT
Start: 2022-07-20

## 2022-07-20 RX ORDER — LOSARTAN POTASSIUM 100 MG/1
TABLET ORAL
COMMUNITY
Start: 2022-07-09

## 2022-07-20 RX ORDER — TRIAMCINOLONE ACETONIDE 1 MG/G
CREAM TOPICAL 2 TIMES DAILY PRN
Qty: 30 G | Refills: 0 | Status: SHIPPED | OUTPATIENT
Start: 2022-07-20 | End: 2022-08-06

## 2022-07-20 RX ORDER — ALCOHOL ANTISEPTIC PADS
PADS, MEDICATED (EA) TOPICAL
Qty: 100 EACH | Refills: 9 | Status: SHIPPED | OUTPATIENT
Start: 2022-07-20

## 2022-07-20 NOTE — ASSESSMENT & PLAN NOTE
Blood pressure is elevated today, recently started on losartan 100 mg daily by Endocrinology  Continue amlodipine 10 mg daily in the morning  He is also on terazosin that he will continue  He will follow-up with us next time and he will bring his blood pressure readings from and blood pressure cuff  Limit salt intake, avoid NSAIDs

## 2022-07-20 NOTE — PROGRESS NOTES
Diabetic Foot Exam    Patient's shoes and socks removed  Right Foot/Ankle   Right Foot Inspection  Skin Exam: skin normal and skin intact  No dry skin, no warmth, no callus, no erythema, no maceration, no abnormal color, no pre-ulcer, no ulcer and no callus  Toe Exam: ROM and strength within normal limits  Sensory   Vibration: diminished  Monofilament testing: diminished    Vascular  Capillary refills: < 3 seconds  The right DP pulse is 1+  The right PT pulse is 1+  Left Foot/Ankle  Left Foot Inspection  Skin Exam: skin normal and skin intact  No dry skin, no warmth, no erythema, no maceration, normal color, no pre-ulcer, no ulcer and no callus  Toe Exam: ROM and strength within normal limits  Sensory   Vibration: diminished  Monofilament testing: diminished    Vascular  Capillary refills: < 3 seconds  The left DP pulse is 1+  The left PT pulse is 1+       Assign Risk Category  No deformity present  No loss of protective sensation  No weak pulses  Risk: 0

## 2022-07-20 NOTE — ASSESSMENT & PLAN NOTE
His sore endocrinologist from [facility] recently  His Trulicity was increased up to 1 5 mg weekly  His NovoLog mix dose was also increased up to a 48 units  We also discussed importance of full in low-carbohydrate diet, he will try to comply    Lab Results   Component Value Date    HGBA1C 11 9 (A) 07/20/2022

## 2022-07-20 NOTE — PROGRESS NOTES
Assessment/Plan:    Type 2 diabetes mellitus with stage 3 chronic kidney disease, with long-term current use of insulin, unspecified whether stage 3a or 3b CKD ([facility] )  His sore endocrinologist from [facility] recently  His Trulicity was increased up to 1 5 mg weekly  His NovoLog mix dose was also increased up to a 48 units  We also discussed importance of full in low-carbohydrate diet, he will try to comply  Lab Results   Component Value Date    HGBA1C 11 9 (A) 07/20/2022       Essential hypertension  Blood pressure is elevated today, recently started on losartan 100 mg daily by Endocrinology  Continue amlodipine 10 mg daily in the morning  He is also on terazosin that he will continue  He will follow-up with us next time and he will bring his blood pressure readings from and blood pressure cuff  Limit salt intake, avoid NSAIDs  Chronic midline low back pain with right-sided sciatica  He is on tramadol, denies any side effects  Controlled substance agreement signed, prescription sent  Mixed hyperlipidemia  He is on atorvastatin 80 mg and Zetia  Will recheck lipid panel  Diagnoses and all orders for this visit:    Type 2 diabetes mellitus with stage 3 chronic kidney disease, with long-term current use of insulin, unspecified whether stage 3a or 3b CKD ([facility] )  -     POCT hemoglobin A1c  -     Comprehensive metabolic panel; Future  -     CBC and differential; Future  -     Lipid panel; Future  -     UA (URINE) with reflex to Scope  -     Microalbumin / creatinine urine ratio    Mixed hyperlipidemia  -     Lipid panel; Future    Essential hypertension  -     TSH, 3rd generation with Free T4 reflex; Future    Shortness of breath  -     albuterol (PROVENTIL HFA,VENTOLIN HFA) 90 mcg/act inhaler;  Inhale 2 puffs every 6 (six) hours as needed for wheezing    Dermatitis  -     triamcinolone (KENALOG) 0 1 % cream; Apply topically 2 (two) times a day as needed for rash    Chronic midline low back pain with right-sided sciatica  -     traMADol (ULTRAM) 50 mg tablet; Take 1 tablet (50 mg total) by mouth every 12 (twelve) hours as needed for moderate pain    Other orders  -     losartan (COZAAR) 100 MG tablet          Subjective:      Patient ID: Pardeep Fofana is a 66 y o  male  Patient came today for follow-up on his multiple chronic problems including hypertension, hyperlipidemia, type 2 diabetes  The following portions of the patient's history were reviewed and updated as appropriate: allergies, current medications, past family history, past medical history, past social history, past surgical history, and problem list     Review of Systems   Constitutional: Negative for activity change, chills, fatigue and fever  HENT: Negative for congestion, ear pain, sinus pressure and sore throat  Eyes: Negative for pain and visual disturbance  Respiratory: Negative for cough, chest tightness, shortness of breath and wheezing  Cardiovascular: Negative for chest pain, palpitations and leg swelling  Gastrointestinal: Negative for abdominal pain, blood in stool, constipation, diarrhea, nausea and vomiting  Endocrine: Negative for polydipsia and polyuria  Genitourinary: Negative for difficulty urinating, dysuria, frequency and urgency  Musculoskeletal: Positive for arthralgias and back pain  Negative for joint swelling and myalgias  Skin: Negative for rash  Neurological: Negative for dizziness, weakness, numbness and headaches  Hematological: Negative for adenopathy  Does not bruise/bleed easily  Psychiatric/Behavioral: Negative for dysphoric mood  The patient is not nervous/anxious            Objective:      /70 (BP Location: Left arm, Patient Position: Sitting, Cuff Size: Standard)   Pulse 65   Resp 12   Ht 5' 7" (1 702 m)   Wt 79 8 kg (176 lb)   SpO2 97%   BMI 27 57 kg/m²     Allergies   Allergen Reactions    Iodinated Diagnostic Agents Other (See Comments) Current Outpatient Medications:     albuterol (PROVENTIL HFA,VENTOLIN HFA) 90 mcg/act inhaler, Inhale 2 puffs every 6 (six) hours as needed for wheezing, Disp: 18 g, Rfl: 2    Alcohol Swabs (Alcohol Pads) 70 % PADS, USE TO CLEAN THE AREAS BEFORE TESTING BLOOD SUGAR OR/AND INJECTING INSULIN ONCE DAILY, Disp: 100 each, Rfl: 10    amLODIPine (NORVASC) 10 mg tablet, Take 1 tablet (10 mg total) by mouth daily, Disp: 30 tablet, Rfl: 3    ammonium lactate (LAC-HYDRIN) 12 % cream, APPLY TOPICALLY TO AFFECTED AREA ONCE DAILY AS NEEDED FOR DRY SKIN, Disp: 385 g, Rfl: 0    aspirin (ECOTRIN LOW STRENGTH) 81 mg EC tablet, Take 81 mg by mouth daily, Disp: , Rfl:     Aspirin Low Dose 81 MG EC tablet, TAKE ONE TABLET BY MOUTH DAILY, Disp: 30 tablet, Rfl: 11    Continuous Blood Gluc Sensor (FreeStyle Lucas 14 Day Sensor) MISC, , Disp: , Rfl:     Continuous Blood Gluc Sensor (FreeStyle Lucas 14 Day Sensor) MISC, Use 1 each every 14 (fourteen) days, Disp: , Rfl:     Dulaglutide 0 75 MG/0 5ML SOPN, Inject 0 75 mg under the skin once a week, Disp: , Rfl:     EASY COMFORT PEN NEEDLES 32G X 4 MM MISC, , Disp: , Rfl:     Empagliflozin 25 MG TABS, Take 25 mg by mouth, Disp: , Rfl:     ergocalciferol (VITAMIN D2) 50,000 units, Take 1 capsule weekly x8 weeks, then 2000 units p o  Daily  , Disp: 8 capsule, Rfl: 1    gabapentin (NEURONTIN) 100 mg capsule, TAKE ONE CAPSULE BY MOUTH THREE TIMES A DAY, Disp: 90 capsule, Rfl: 1    Glucose Blood ([employer]S GLUCOSE METER TEST STRIPS VI), Use Three times a day, Disp: , Rfl:     glucose blood (FREESTYLE LITE) test strip, 1 each by Other route 4 (four) times a day, Disp: 100 each, Rfl: 11    glucose blood test strip, Use Three times a day, Disp: , Rfl:     ibuprofen (MOTRIN) 800 mg tablet, , Disp: , Rfl:     insulin aspart protamine-insulin aspart (NovoLOG Mix 70/30 FlexPen) 100 Units/mL injection pen, Inject 40 units subcutaneously twice a day before meals, Disp: , Rfl:     Insulin Pen Needle 32G X 4 MM MISC, Use with flexpens BID, Disp: , Rfl:     JANUVIA 25 MG tablet, Take 25 mg by mouth daily Take 1 tablet (25 mg) per mouth once daily, Disp: , Rfl:     losartan (COZAAR) 100 MG tablet, , Disp: , Rfl:     metFORMIN (GLUCOPHAGE) 1000 MG tablet, TAKE ONE TABLET BY MOUTH TWICE A DAY WITH MEALS, Disp: 60 tablet, Rfl: 0    pantoprazole (PROTONIX) 40 mg tablet, TAKE ONE TABLET BY MOUTH ONCE DAILY, Disp: 30 tablet, Rfl: 4    SURE COMFORT LANCETS 30G MISC, use as directed, Disp: , Rfl:     tadalafil (CIALIS) 10 MG tablet, Take 10 mg by mouth, Disp: , Rfl:     traMADol (ULTRAM) 50 mg tablet, Take 1 tablet (50 mg total) by mouth every 12 (twelve) hours as needed for moderate pain, Disp: 60 tablet, Rfl: 0    triamcinolone (KENALOG) 0 1 % cream, Apply topically 2 (two) times a day as needed for rash, Disp: 30 g, Rfl: 0    atorvastatin (LIPITOR) 80 mg tablet, Take 80 mg by mouth daily with dinner, Disp: , Rfl:     ezetimibe (ZETIA) 10 mg tablet, Take 10 mg by mouth daily, Disp: , Rfl:     terazosin (HYTRIN) 10 MG capsule, Take 10 mg by mouth, Disp: , Rfl:      Patient Instructions   Take losartan for your blood pressure at night,   Continue amlodipine once a day in the morning  In 3 months bring your blood pressure cuff and your blood pressure readings from home  10% - bad control"> 10% - bad control,Hemoglobin A1c (HbA1c) greater than 10% indicating poor diabetic control,Haemoglobin A1c greater than 10% indicating poor diabetic control">   Diabetes mellitus tipo 2 en adultos, cuidados ambulatorios   INFORMACIÓN GENERAL:   La diabetes mellitus tipo 2 en adultos  es michael enfermedad que afecta la forma en que el cuerpo utiliza la glucosa (azúcar)  La insulina ayuda a extraer el azúcar de la arun para que pueda usarse en la producción de energía  Generalmente, cuando el nivel de azúcar [place], el páncreas produce más insulina   La diabetes tipo 2 se desarrolla ya sea porque el cuerpo no puede producir suficiente insulina, o no la puede usar correctamente  Después de [place], rider páncreas podría dejar de producir insulina  Síntomas comunes incluyen los siguientes:   · Más hambre o sed de la usual    · Necesidad frecuente de orinar     · Pérdida de peso sin tratar     · Visión borrosa  Busque cuidados inmediatos para los siguientes síntomas:   · Dolor abdominal severo o dolor que se propaga hacia rider espalda  Es probable que usted también vomite  · Dificultad para permanecer despierto o concentrado    · Temblores o sudoración    · Visión borrosa o doble    · Aliento con olor a frutas o jeremy    · Respiración profunda y dificultosa o rápida y superficial    · Ritmo cardíaco rápido y débil  El tratamiento para la diabetes mellitus tipo 2  incluye mantener rider nivel de azúcar en el arun a un rango normal  Usted debe comer los alimentos correctos y ejercitarse con regularidad  Además es probable que necesite medicamentos si no puede controlar rider nivel de azúcar en la arun con nutrición y ejercicio  Maneje la diabetes mellitus tipo 2:   · Revise rider nivel de azúcar en la arun  A usted le enseñarán cómo revisar michael pequeña gota de [place] en un medidor de glucosa  Pregúntele a rider proveedor de dylan cuándo y con cuánta frecuencia es necesario revisar roma el día  También pregúntele a rider proveedor de dylan cuáles deberían ser ann niveles de azúcar en la arun cuando usted se los [place]  · Mantenga un registro de los carbohidratos (azúcares y almidones)  Rider nivel de azúcar en la arun puede elevarse demasiado si usted come demasiados carbohidratos  Rider dietista le ayudará a planear comidas y meriendas que tengan la cantidad correcta de carbohidratos  · Consuma alimentos bajos en grasas  Shy [place] son el noe sin piel y la leche descremada  · Consuma menos sodio (sal)    Algunos ejemplos de alimentos altos en sodio que hay que limitar son la salsa de soya, las shelby tostadas y la sopa  No le agregue sal a la comida que usted cocina  Limite rider uso de sal de rodriguez  · Coma alimentos altos en fibra  Alimentos que son buena daniel de fibra incluyen los vegetales, el pan integral y los frijoles  · Limite el alcohol  El alcohol afecta rider nivel de azúcar en la arun y puede dificultar el [place] de rider diabetes  Las mujeres deben limitar el consumo de alcohol a 1 bebida al día  Los hombres deben limitarlo a 2 debidas al día  Michael bebida de alcohol equivale a 12 onzas de cerveza, 5 onzas de vino o 1½ onzas de licor  · Ejercítese regularmente  El ejercicio puede ayudar a mantener estable rider nivel de azúcar en la arun, al mismo tiempo que disminuye rider riesgo de enfermedad cardíaca y le ayuda a perder peso  Ejercítese por al menos 30 minutos, 5 días a la semana  Delayne [place] de fortalecimiento muscular 2 días a la semana  Colabore con rider proveedor de dylan para crear un plan de ejercicios  · Revise ann pies a diario  para jose rafael si tienen heridas o llagas abiertas  Pregúntele a rider proveedor de [employer] que usted puede hacer si tiene michael llaga abierta  · Deje de fumar  Si usted fuma, nunca es tarde para dejar de hacerlo  El fumar puede [employer] problemas que puedan ocurrir con la diabetes  Pregúntele a rider proveedor de [employer] para aprender a dejar de fumar si usted tiene dificultad para hacerlo  · Pregunte sobre rider peso:  Pregúntele a ann proveedores de dylan si usted necesita perder peso y cuánto debe perder  Pídales que le ayuden con un programa de perdida de [place]  Incluso perder unas 10 a 15 libras puede ayudarle a manejar rider nivel de azúcar en la arun  · Tenga a mano rider identificación de [employer]  Use un brazalete de alerta médica o lleve consigo michael tarjeta que diga que usted tiene diabetes  Pregúntele a rider proveedor de dylan dónde conseguir estos artículos  · Pregunte sobre vacunas    La diabetes lo pone a usted en riesgo de enfermedad seria si usted se resfría, tiene neumonía o hepatitis  Pregúntele a gordon proveedor de dylan si usted debe ponerse las vacunas contra la gripe, neumonía o hepatitis B, y cuándo ponérselas  Programe michael gurpreet con gordon proveedor de [employer] se le haya indicado: Anote ann preguntas para que se acuerde de hacerlas roma ann visitas  ACUERDOS SOBRE GORDON CUIDADO:   Usted tiene el derecho de participar en la planificación de gordon cuidado  Aprenda todo lo que pueda sobre gordon condición y makayla darle tratamiento  Discuta con ann médicos ann opciones de tratamiento para juntos decidir el cuidado que usted quiere recibir  Usted siempre tiene el derecho a rechazar gordon tratamiento  Esta información es sólo para uso en educación  Gordon intención no es darle un consejo médico sobre enfermedades o tratamientos  Colsulte con gordon Urrutia Dumont farmacéutico antes de seguir cualquier régimen médico para saber si es seguro y efectivo para usted  © 2014 [address] is for End User's use only and may not be sold, redistributed or otherwise used for commercial purposes  All illustrations and images included in CareNotes® are the copyrighted property of A D A M , Inc  or Jake Gaspar  Physical Exam  Vitals reviewed  Constitutional:       General: He is not in acute distress  Appearance: He is not toxic-appearing  HENT:      Head: Normocephalic  Cardiovascular:      Rate and Rhythm: Normal rate  Pulses: Normal pulses  Heart sounds: No murmur heard  No gallop  Pulmonary:      Effort: Pulmonary effort is normal  No respiratory distress  Breath sounds: No wheezing or rales  Skin:     General: Skin is warm  Neurological:      General: No focal deficit present  Mental Status: He is alert     Psychiatric:         Mood and Affect: Mood normal          Behavior: Behavior normal

## 2022-07-20 NOTE — PATIENT INSTRUCTIONS
Take losartan for your blood pressure at night,   Continue amlodipine once a day in the morning  In 3 months bring your blood pressure cuff and your blood pressure readings from home  10% - bad control"> 10% - bad control,Hemoglobin A1c (HbA1c) greater than 10% indicating poor diabetic control,Haemoglobin A1c greater than 10% indicating poor diabetic control">   Diabetes mellitus tipo 2 en adultos, cuidados ambulatorios   INFORMACIÓN GENERAL:   La diabetes mellitus tipo 2 en adultos  es michael enfermedad que afecta la forma en que el cuerpo utiliza la glucosa (azúcar)  La insulina ayuda a extraer el azúcar de la arun para que pueda usarse en la producción de energía  Generalmente, cuando el nivel de azúcar [place], el páncreas produce más insulina  La diabetes tipo 2 se desarrolla ya sea porque el cuerpo no puede producir suficiente insulina, o no la puede usar correctamente  Después de [place], rider páncreas podría dejar de producir insulina  Síntomas comunes incluyen los siguientes:   · Más hambre o sed de la usual    · Necesidad frecuente de orinar     · Pérdida de peso sin tratar     · Visión borrosa  Busque cuidados inmediatos para los siguientes síntomas:   · Dolor abdominal severo o dolor que se propaga hacia rider espalda  Es probable que usted también vomite  · Dificultad para permanecer despierto o concentrado    · Temblores o sudoración    · Visión borrosa o doble    · Aliento con olor a frutas o jeremy    · Respiración profunda y dificultosa o rápida y superficial    · Ritmo cardíaco rápido y débil  El tratamiento para la diabetes mellitus tipo 2  incluye mantener rider nivel de azúcar en el arun a un rango normal  Usted debe comer los alimentos correctos y ejercitarse con regularidad  Además es probable que necesite medicamentos si no puede controlar rider nivel de azúcar en la arun con nutrición y ejercicio  Maneje la diabetes mellitus tipo 2:   · Revise rider nivel de azúcar en la arun    A usted le enseñarán cómo revisar michael pequeña gota de [place] en un medidor de glucosa  Pregúntele a godoy proveedor de dylan cuándo y con cuánta frecuencia es necesario revisar roma el día  También pregúntele a godoy proveedor de dylan cuáles deberían ser ann niveles de azúcar en la arun cuando usted se los [place]  · Mantenga un registro de los carbohidratos (azúcares y almidones)  Godoy nivel de azúcar en la arun puede elevarse demasiado si usted come demasiados carbohidratos  Godoy dietista le ayudará a planear comidas y meriendas que tengan la cantidad correcta de carbohidratos  · Consuma alimentos bajos en grasas  Keli Whitney son el noe sin piel y la leche descremada  · Consuma menos sodio (sal)  Algunos ejemplos de alimentos altos en sodio que hay que limitar son la salsa de soya, las shelby tostadas y la sopa  No le agregue sal a la comida que usted cocina  Limite godoy uso de sal de rodriguez  · Coma alimentos altos en fibra  Alimentos que son buena daniel de fibra incluyen los vegetales, el pan integral y los frijoles  · Limite el alcohol  El alcohol afecta godoy nivel de azúcar en la arun y puede dificultar el [place] de godoy diabetes  Las mujeres deben limitar el consumo de alcohol a 1 bebida al día  Los hombres deben limitarlo a 2 debidas al día  Michael bebida de alcohol equivale a 12 onzas de cerveza, 5 onzas de vino o 1½ onzas de licor  · Ejercítese regularmente  El ejercicio puede ayudar a mantener estable godoy nivel de azúcar en la arun, al mismo tiempo que disminuye godoy riesgo de enfermedad cardíaca y le ayuda a perder peso  Ejercítese por al menos 30 minutos, 5 días a la semana  [place] de fortalecimiento muscular 2 días a la semana  Colabore con godoy proveedor de dylan para crear un plan de ejercicios  · Revise ann pies a diario  para jose rafael si tienen heridas o llagas abiertas  Pregúntele a godoy proveedor de [employer] que usted puede hacer si tiene michael llaga abierta  · Deje de fumar  Si usted fuma, nunca es tarde para dejar de hacerlo  El fumar puede [employer] problemas que puedan ocurrir con la diabetes  Pregúntele a gordon proveedor de [employer] para aprender a dejar de fumar si usted tiene dificultad para hacerlo  · Pregunte sobre gordon peso:  Pregúntele a ann proveedores de dylan si usted necesita perder peso y cuánto debe perder  Pídales que le ayuden con un programa de perdida de [place]  Incluso perder unas 10 a 15 libras puede ayudarle a manejar gordon nivel de azúcar en la arun  · Tenga a mano gordon identificación de [employer]  Use un brazalete de alerta médica o lleve consigo michael tarjeta que diga que usted tiene diabetes  Pregúntele a gordon proveedor de dylan dónde conseguir estos artículos  · Pregunte sobre vacunas  La diabetes lo pone a usted en riesgo de enfermedad seria si usted se resfría, tiene neumonía o hepatitis  Pregúntele a gordon proveedor de dylan si usted debe ponerse las vacunas contra la gripe, neumonía o hepatitis B, y cuándo ponérselas  Programe michael gurpreet con gordon proveedor de [employer] se le haya indicado: Anote ann preguntas para que se acuerde de hacerlas roma ann visitas  ACUERDOS SOBRE GORDON CUIDADO:   Usted tiene el derecho de participar en la planificación de gordon cuidado  Aprenda todo lo que pueda sobre gordon condición y makayla darle tratamiento  Discuta con ann médicos ann opciones de tratamiento para juntos decidir el cuidado que usted quiere recibir  Usted siempre tiene el derecho a rechazar gordon tratamiento  Esta información es sólo para uso en educación  Gordon intención no es darle un consejo médico sobre enfermedades o tratamientos  Colsulte con gordon Kenneth Aparicio farmacéutico antes de seguir cualquier régimen médico para saber si es seguro y efectivo para usted  © 2014 [address] is for End User's use only and may not be sold, redistributed or otherwise used for commercial purposes   All illustrations and images included in CareNotes® are the copyrighted property of A D A M , Inc  or Jake Gaspar

## 2022-08-06 DIAGNOSIS — L30.9 DERMATITIS: ICD-10-CM

## 2022-08-06 RX ORDER — TRIAMCINOLONE ACETONIDE 1 MG/G
CREAM TOPICAL
Qty: 30 G | Refills: 0 | Status: SHIPPED | OUTPATIENT
Start: 2022-08-06

## 2022-09-19 DIAGNOSIS — R06.02 SHORTNESS OF BREATH: ICD-10-CM

## 2022-09-19 RX ORDER — ALBUTEROL SULFATE 90 UG/1
AEROSOL, METERED RESPIRATORY (INHALATION)
Qty: 18 G | Refills: 0 | Status: SHIPPED | OUTPATIENT
Start: 2022-09-19 | End: 2022-10-12

## 2022-10-21 ENCOUNTER — OFFICE VISIT (OUTPATIENT)
Dept: FAMILY MEDICINE CLINIC | Facility: CLINIC | Age: 72
End: 2022-10-21
Payer: MEDICARE

## 2022-10-21 VITALS
WEIGHT: 172.4 LBS | OXYGEN SATURATION: 98 % | BODY MASS INDEX: 27.06 KG/M2 | RESPIRATION RATE: 12 BRPM | HEART RATE: 65 BPM | SYSTOLIC BLOOD PRESSURE: 140 MMHG | HEIGHT: 67 IN | DIASTOLIC BLOOD PRESSURE: 80 MMHG

## 2022-10-21 DIAGNOSIS — Z79.4 TYPE 2 DIABETES MELLITUS WITH HYPERGLYCEMIA, WITH LONG-TERM CURRENT USE OF INSULIN (HCC): ICD-10-CM

## 2022-10-21 DIAGNOSIS — E11.65 TYPE 2 DIABETES MELLITUS WITH HYPERGLYCEMIA, WITH LONG-TERM CURRENT USE OF INSULIN (HCC): ICD-10-CM

## 2022-10-21 DIAGNOSIS — N18.30 TYPE 2 DIABETES MELLITUS WITH STAGE 3 CHRONIC KIDNEY DISEASE, WITH LONG-TERM CURRENT USE OF INSULIN, UNSPECIFIED WHETHER STAGE 3A OR 3B CKD (HCC): Primary | ICD-10-CM

## 2022-10-21 DIAGNOSIS — E11.22 TYPE 2 DIABETES MELLITUS WITH STAGE 3 CHRONIC KIDNEY DISEASE, WITH LONG-TERM CURRENT USE OF INSULIN, UNSPECIFIED WHETHER STAGE 3A OR 3B CKD (HCC): Primary | ICD-10-CM

## 2022-10-21 DIAGNOSIS — F11.20 CONTINUOUS OPIOID DEPENDENCE (HCC): ICD-10-CM

## 2022-10-21 DIAGNOSIS — Z79.4 TYPE 2 DIABETES MELLITUS WITH STAGE 3 CHRONIC KIDNEY DISEASE, WITH LONG-TERM CURRENT USE OF INSULIN, UNSPECIFIED WHETHER STAGE 3A OR 3B CKD (HCC): Primary | ICD-10-CM

## 2022-10-21 DIAGNOSIS — E78.2 MIXED HYPERLIPIDEMIA: ICD-10-CM

## 2022-10-21 DIAGNOSIS — Z23 NEED FOR INFLUENZA VACCINATION: ICD-10-CM

## 2022-10-21 PROCEDURE — G0008 ADMIN INFLUENZA VIRUS VAC: HCPCS

## 2022-10-21 PROCEDURE — 90662 IIV NO PRSV INCREASED AG IM: CPT

## 2022-10-21 PROCEDURE — 99215 OFFICE O/P EST HI 40 MIN: CPT | Performed by: INTERNAL MEDICINE

## 2022-10-21 RX ORDER — NALOXONE HYDROCHLORIDE 4 MG/.1ML
1 SPRAY NASAL ONCE
Qty: 1 EACH | Refills: 1 | Status: SHIPPED | OUTPATIENT
Start: 2022-10-21 | End: 2022-10-21

## 2022-10-21 RX ORDER — BLOOD-GLUCOSE METER
1 KIT MISCELLANEOUS 4 TIMES DAILY
Qty: 300 EACH | Refills: 3 | Status: SHIPPED | OUTPATIENT
Start: 2022-10-21

## 2022-10-21 NOTE — PROGRESS NOTES
Assessment/Plan     Problem List Items Addressed This Visit        Endocrine    Type 2 diabetes mellitus with stage 3 chronic kidney disease, with long-term current use of insulin, unspecified whether stage 3a or 3b CKD ([facility] [address] ) - Primary     Will repeat hemoglobin A1c  Continue the same therapy as for now  Lab Results   Component Value Date    HGBA1C 11 9 (A) 07/20/2022            Relevant Medications    glucose blood (FREESTYLE LITE) test strip    Other Relevant Orders    HEMOGLOBIN A1C W/ EAG ESTIMATION       Other    Mixed hyperlipidemia     Will check lipid panel  Continue current dose of atorvastatin  Continuous opioid dependence (HCC)    Relevant Medications    naloxone (NARCAN) 4 mg/0 1 mL nasal spray    Other Relevant Orders    [employer] All Prescribed Meds and Special Instructions    Amphetamines, Methamphetamines    Butalbital    Phenobarbital    Secobarbital    Temazepam    Alprazolam    Clonazepam    Diazepam    Lorazepam    Oxazepam    Gabapentin    Pregabalin    Cocaine    Heroin    Buprenorphine    Levorphanol    Meperidine    Naltrexone    Fentanyl    Methadone    Oxycodone    Oxymorphone    Tapentadol    THC    Tramadol    Codeine, Hydrocodone, Hydropmorphone, Morphine    Bath Salts    Ethyl Glucuronide/Ethyl Sulfate    Kratom    Spice    Methylphenidate    Phentermine    Validity Oxidant    Validity Creatinine    Validity pH    Validity Specific      Other Visit Diagnoses     Type 2 diabetes mellitus with hyperglycemia, with long-term current use of insulin (HCC)        Relevant Medications    glucose blood (FREESTYLE LITE) test strip         Treatment Plan: He uses tramadol very sparingly, he says that it is helps a very well with his chronic pain  Denies any side effects  Opiate risks  There are risks associated with opioid medications, including dependence, addiction and tolerance  The patient understands and agrees to use these medications only as prescribed   Potential side effects of the medications include, but are not limited to, constipation, drowsiness, addiction, impaired judgment and risk of fatal overdose if not taken as prescribed  The patient was warned against driving while taking sedation medications  Sharing medications is a felony  At this point in time, the patient is showing no signs of addiction, abuse, diversion or suicidal ideation  Opioid agreement  Pain management agreement was reviewed with patient and signed/updated during visit          Subjective     Opioid Management:   Type of visit: Follow-up    Screening Tools/Assessments:    PHQ-2/9:  Last PHQ-2 score: 0 (Last PHQ-2 date: 1/19/2022)    Brief Pain Inventory (BPI):  1) Throughout our lives, most of us have had pain from time to time (such as minor headaches, sprains, and toothaches)  Have you had pain other than these everyday kinds of pain today? Yes  2) Where is your pain located? 3) Rate your pain at its worst in the last 24 hours: 5  4) Rate your pain at its least in the last 24 hours: 3  5) Rate your average level of pain: 5  6) Rate your pain right now:  7) What treatments or medications are you receiving for your pain? 8) In the past 24 hours, how much relief have pain treatments or medication provided? 60%  9) During the past 24 hours, pain has interfered with your:   A) General activity: 5    B) Mood: 0     C) Walking ability: 5     D) Normal work (work outside the home & housework): 5     E) Relations with other people: 0     F) Sleep: 5     G) Enjoyment of life: 0    COMM:  Current COMM Score: 0 (negative, low risk patient)    Opioid agreement:  Active Opioid agreement on file?: Yes    Opioid agreement signed date: 7/28/2022  Opioid agreement expiration date: 7/28/2023    Naloxone:  Currently prescribed Naloxone (Narcan): Yes      Patient came today for to follow-up on his multiple chronic problems including type 2 diabetes, hyperlipidemia, essential hypertension      Pain Medications Aspirin Low Dose 81 MG EC tablet TAKE ONE TABLET BY MOUTH DAILY    gabapentin (NEURONTIN) 100 mg capsule TAKE ONE CAPSULE BY MOUTH THREE TIMES A DAY    ibuprofen (MOTRIN) 800 mg tablet     traMADol (ULTRAM) 50 mg tablet Take 1 tablet (50 mg total) by mouth every 12 (twelve) hours as needed for moderate pain    aspirin (ECOTRIN LOW STRENGTH) 81 mg EC tablet Take 81 mg by mouth daily         Outpatient Morphine Milligram Equivalents Per Day     10/21/22 and after 10 MME/Day    Order Name Dose Route Frequency Maximum MME/Day     traMADol (ULTRAM) 50 mg tablet 50 mg Oral Every 12 hours PRN 10 MME/Day    Total Potential Morphine Milligram Equivalents Per Day 10 MME/Day    Calculation Information        traMADol (ULTRAM) 50 mg tablet    traMADol 50 mg Tabs: single dose of 50 mg * 2 doses per day * morphine equivalence factor of 0 1 = 10 MME/Day                         PDMP Review       Value Time User    PDMP Reviewed  Yes 4/20/2022  8:30 AM Isa Lux MD         Review of Systems   Constitutional: Negative for activity change, chills, fatigue and fever  HENT: Negative for congestion, ear pain, sinus pressure and sore throat  Eyes: Negative for pain and visual disturbance  Respiratory: Negative for cough, chest tightness, shortness of breath and wheezing  Cardiovascular: Negative for chest pain, palpitations and leg swelling  Gastrointestinal: Negative for abdominal pain, blood in stool, constipation, diarrhea, nausea and vomiting  Endocrine: Negative for polydipsia and polyuria  Genitourinary: Negative for difficulty urinating, dysuria, frequency and urgency  Musculoskeletal: Positive for arthralgias and back pain  Negative for joint swelling and myalgias  Skin: Negative for rash  Neurological: Negative for dizziness, weakness, numbness and headaches  Hematological: Negative for adenopathy  Does not bruise/bleed easily  Psychiatric/Behavioral: Negative for dysphoric mood   The patient is not nervous/anxious  Objective     /80 (BP Location: Left arm, Patient Position: Sitting, Cuff Size: Standard)   Pulse 65   Resp 12   Ht 5' 7" (1 702 m)   Wt 78 2 kg (172 lb 6 4 oz)   SpO2 98%   BMI 27 00 kg/m²     Physical Exam  Vitals reviewed  Constitutional:       Appearance: Normal appearance  Eyes:      Pupils: Pupils are equal, round, and reactive to light  Cardiovascular:      Rate and Rhythm: Normal rate and regular rhythm  Heart sounds: No murmur heard  No friction rub  No gallop  Pulmonary:      Effort: Pulmonary effort is normal  No respiratory distress  Breath sounds: Normal breath sounds  No wheezing, rhonchi or rales  Abdominal:      General: There is no distension  Tenderness: There is no abdominal tenderness  There is no guarding or rebound  Musculoskeletal:         General: Tenderness present  No swelling  Skin:     Coloration: Skin is not jaundiced or pale  Findings: No rash  Neurological:      Mental Status: He is alert     Psychiatric:         Mood and Affect: Mood normal          Behavior: Behavior normal          Josselin Velásquez MD

## 2022-10-21 NOTE — PATIENT INSTRUCTIONS

## 2022-10-21 NOTE — ASSESSMENT & PLAN NOTE
Will repeat hemoglobin A1c  Continue the same therapy as for now    Lab Results   Component Value Date    HGBA1C 11 9 (A) 07/20/2022

## 2022-10-25 LAB

## 2022-12-12 DIAGNOSIS — F11.20 CONTINUOUS OPIOID DEPENDENCE (HCC): ICD-10-CM

## 2022-12-13 RX ORDER — NALOXONE HYDROCHLORIDE 4 MG/.1ML
SPRAY NASAL
Qty: 2 EACH | Refills: 0 | Status: SHIPPED | OUTPATIENT
Start: 2022-12-13

## 2023-01-03 DIAGNOSIS — R06.02 SHORTNESS OF BREATH: ICD-10-CM

## 2023-01-03 RX ORDER — ALBUTEROL SULFATE 90 UG/1
AEROSOL, METERED RESPIRATORY (INHALATION)
Qty: 6.7 G | Refills: 2 | Status: SHIPPED | OUTPATIENT
Start: 2023-01-03

## 2023-01-17 DIAGNOSIS — M54.41 CHRONIC MIDLINE LOW BACK PAIN WITH RIGHT-SIDED SCIATICA: ICD-10-CM

## 2023-01-17 DIAGNOSIS — G89.29 CHRONIC MIDLINE LOW BACK PAIN WITH RIGHT-SIDED SCIATICA: ICD-10-CM

## 2023-01-17 RX ORDER — TRAMADOL HYDROCHLORIDE 50 MG/1
50 TABLET ORAL EVERY 12 HOURS PRN
Qty: 60 TABLET | Refills: 0 | Status: SHIPPED | OUTPATIENT
Start: 2023-01-17

## 2023-03-06 DIAGNOSIS — R06.02 SHORTNESS OF BREATH: ICD-10-CM

## 2023-03-07 RX ORDER — ALBUTEROL SULFATE 90 UG/1
AEROSOL, METERED RESPIRATORY (INHALATION)
Qty: 6.7 G | Refills: 1 | Status: SHIPPED | OUTPATIENT
Start: 2023-03-07

## 2023-04-29 DIAGNOSIS — Z79.4 TYPE 2 DIABETES MELLITUS WITH HYPERGLYCEMIA, WITH LONG-TERM CURRENT USE OF INSULIN (HCC): ICD-10-CM

## 2023-04-29 DIAGNOSIS — E11.65 TYPE 2 DIABETES MELLITUS WITH HYPERGLYCEMIA, WITH LONG-TERM CURRENT USE OF INSULIN (HCC): ICD-10-CM

## 2023-04-29 RX ORDER — BLOOD-GLUCOSE METER
KIT MISCELLANEOUS
Qty: 300 STRIP | Refills: 7 | Status: SHIPPED | OUTPATIENT
Start: 2023-04-29 | End: 2023-05-01

## 2023-05-01 ENCOUNTER — TELEPHONE (OUTPATIENT)
Dept: FAMILY MEDICINE CLINIC | Facility: CLINIC | Age: 73
End: 2023-05-01

## 2023-05-01 DIAGNOSIS — Z79.4 TYPE 2 DIABETES MELLITUS WITH HYPERGLYCEMIA, WITH LONG-TERM CURRENT USE OF INSULIN (HCC): ICD-10-CM

## 2023-05-01 DIAGNOSIS — E11.65 TYPE 2 DIABETES MELLITUS WITH HYPERGLYCEMIA, WITH LONG-TERM CURRENT USE OF INSULIN (HCC): ICD-10-CM

## 2023-05-01 RX ORDER — BLOOD-GLUCOSE METER
1 KIT MISCELLANEOUS 3 TIMES DAILY
Qty: 300 STRIP | Refills: 7 | Status: SHIPPED | OUTPATIENT
Start: 2023-05-01

## 2023-05-24 ENCOUNTER — APPOINTMENT (OUTPATIENT)
Dept: LAB | Facility: HOSPITAL | Age: 73
End: 2023-05-24
Payer: MEDICARE

## 2023-05-24 DIAGNOSIS — N18.2 CHRONIC KIDNEY DISEASE, STAGE II (MILD): Primary | ICD-10-CM

## 2023-05-24 DIAGNOSIS — N10 ACUTE PYELONEPHRITIS WITHOUT LESION OF RENAL MEDULLARY NECROSIS: ICD-10-CM

## 2023-05-24 DIAGNOSIS — Z78.9 NORMAL TISSUE: ICD-10-CM

## 2023-05-24 LAB
25(OH)D3 SERPL-MCNC: 30.8 NG/ML (ref 30–100)
ALBUMIN SERPL BCP-MCNC: 4.3 G/DL (ref 3.5–5)
ALP SERPL-CCNC: 78 U/L (ref 34–104)
ALT SERPL W P-5'-P-CCNC: 11 U/L (ref 7–52)
ANION GAP SERPL CALCULATED.3IONS-SCNC: 7 MMOL/L (ref 4–13)
AST SERPL W P-5'-P-CCNC: 13 U/L (ref 13–39)
BASOPHILS # BLD AUTO: 0.04 THOUSANDS/ÂΜL (ref 0–0.1)
BASOPHILS NFR BLD AUTO: 1 % (ref 0–1)
BILIRUB SERPL-MCNC: 0.62 MG/DL (ref 0.2–1)
BUN SERPL-MCNC: 17 MG/DL (ref 5–25)
CALCIUM SERPL-MCNC: 9.7 MG/DL (ref 8.4–10.2)
CHLORIDE SERPL-SCNC: 102 MMOL/L (ref 96–108)
CHOLEST SERPL-MCNC: 240 MG/DL
CO2 SERPL-SCNC: 30 MMOL/L (ref 21–32)
CREAT SERPL-MCNC: 0.97 MG/DL (ref 0.6–1.3)
EOSINOPHIL # BLD AUTO: 0.12 THOUSAND/ÂΜL (ref 0–0.61)
EOSINOPHIL NFR BLD AUTO: 2 % (ref 0–6)
ERYTHROCYTE [DISTWIDTH] IN BLOOD BY AUTOMATED COUNT: 12.4 % (ref 11.6–15.1)
EST. AVERAGE GLUCOSE BLD GHB EST-MCNC: 275 MG/DL
GFR SERPL CREATININE-BSD FRML MDRD: 76 ML/MIN/1.73SQ M
GLUCOSE P FAST SERPL-MCNC: 309 MG/DL (ref 65–99)
HBA1C MFR BLD: 11.2 %
HCT VFR BLD AUTO: 43.1 % (ref 36.5–49.3)
HDLC SERPL-MCNC: 51 MG/DL
HGB BLD-MCNC: 14 G/DL (ref 12–17)
IMM GRANULOCYTES # BLD AUTO: 0.01 THOUSAND/UL (ref 0–0.2)
IMM GRANULOCYTES NFR BLD AUTO: 0 % (ref 0–2)
LDLC SERPL CALC-MCNC: 170 MG/DL (ref 0–100)
LYMPHOCYTES # BLD AUTO: 3.54 THOUSANDS/ÂΜL (ref 0.6–4.47)
LYMPHOCYTES NFR BLD AUTO: 60 % (ref 14–44)
MCH RBC QN AUTO: 29.5 PG (ref 26.8–34.3)
MCHC RBC AUTO-ENTMCNC: 32.5 G/DL (ref 31.4–37.4)
MCV RBC AUTO: 91 FL (ref 82–98)
MONOCYTES # BLD AUTO: 0.44 THOUSAND/ÂΜL (ref 0.17–1.22)
MONOCYTES NFR BLD AUTO: 8 % (ref 4–12)
NEUTROPHILS # BLD AUTO: 1.72 THOUSANDS/ÂΜL (ref 1.85–7.62)
NEUTS SEG NFR BLD AUTO: 29 % (ref 43–75)
NONHDLC SERPL-MCNC: 189 MG/DL
NRBC BLD AUTO-RTO: 0 /100 WBCS
PLATELET # BLD AUTO: 216 THOUSANDS/UL (ref 149–390)
PMV BLD AUTO: 10.9 FL (ref 8.9–12.7)
POTASSIUM SERPL-SCNC: 4.3 MMOL/L (ref 3.5–5.3)
PROT SERPL-MCNC: 7.8 G/DL (ref 6.4–8.4)
RBC # BLD AUTO: 4.74 MILLION/UL (ref 3.88–5.62)
SODIUM SERPL-SCNC: 139 MMOL/L (ref 135–147)
TRIGL SERPL-MCNC: 93 MG/DL
TSH SERPL DL<=0.05 MIU/L-ACNC: 3.66 UIU/ML (ref 0.45–4.5)
WBC # BLD AUTO: 5.87 THOUSAND/UL (ref 4.31–10.16)

## 2023-05-24 PROCEDURE — 84443 ASSAY THYROID STIM HORMONE: CPT

## 2023-05-24 PROCEDURE — 80061 LIPID PANEL: CPT

## 2023-05-24 PROCEDURE — 80053 COMPREHEN METABOLIC PANEL: CPT

## 2023-05-24 PROCEDURE — 85025 COMPLETE CBC W/AUTO DIFF WBC: CPT

## 2023-05-24 PROCEDURE — 82306 VITAMIN D 25 HYDROXY: CPT

## 2023-05-24 PROCEDURE — 83036 HEMOGLOBIN GLYCOSYLATED A1C: CPT

## 2023-05-24 PROCEDURE — 36415 COLL VENOUS BLD VENIPUNCTURE: CPT

## 2023-05-26 ENCOUNTER — OFFICE VISIT (OUTPATIENT)
Dept: FAMILY MEDICINE CLINIC | Facility: CLINIC | Age: 73
End: 2023-05-26

## 2023-05-26 VITALS
HEART RATE: 77 BPM | OXYGEN SATURATION: 98 % | SYSTOLIC BLOOD PRESSURE: 170 MMHG | BODY MASS INDEX: 27.06 KG/M2 | WEIGHT: 172.8 LBS | DIASTOLIC BLOOD PRESSURE: 82 MMHG

## 2023-05-26 DIAGNOSIS — E11.65 TYPE 2 DIABETES MELLITUS WITH HYPERGLYCEMIA, WITH LONG-TERM CURRENT USE OF INSULIN (HCC): ICD-10-CM

## 2023-05-26 DIAGNOSIS — F11.20 CONTINUOUS OPIOID DEPENDENCE (HCC): ICD-10-CM

## 2023-05-26 DIAGNOSIS — N18.30 TYPE 2 DIABETES MELLITUS WITH STAGE 3 CHRONIC KIDNEY DISEASE, WITH LONG-TERM CURRENT USE OF INSULIN, UNSPECIFIED WHETHER STAGE 3A OR 3B CKD (HCC): ICD-10-CM

## 2023-05-26 DIAGNOSIS — Z79.4 TYPE 2 DIABETES MELLITUS WITH STAGE 3 CHRONIC KIDNEY DISEASE, WITH LONG-TERM CURRENT USE OF INSULIN, UNSPECIFIED WHETHER STAGE 3A OR 3B CKD (HCC): ICD-10-CM

## 2023-05-26 DIAGNOSIS — I10 ESSENTIAL HYPERTENSION: ICD-10-CM

## 2023-05-26 DIAGNOSIS — E11.22 TYPE 2 DIABETES MELLITUS WITH STAGE 3 CHRONIC KIDNEY DISEASE, WITH LONG-TERM CURRENT USE OF INSULIN, UNSPECIFIED WHETHER STAGE 3A OR 3B CKD (HCC): ICD-10-CM

## 2023-05-26 DIAGNOSIS — E78.2 MIXED HYPERLIPIDEMIA: Primary | ICD-10-CM

## 2023-05-26 DIAGNOSIS — Z79.4 TYPE 2 DIABETES MELLITUS WITH HYPERGLYCEMIA, WITH LONG-TERM CURRENT USE OF INSULIN (HCC): ICD-10-CM

## 2023-05-26 DIAGNOSIS — K21.9 GASTROESOPHAGEAL REFLUX DISEASE: ICD-10-CM

## 2023-05-26 DIAGNOSIS — F11.90 OPIOID USE: ICD-10-CM

## 2023-05-26 RX ORDER — LOSARTAN POTASSIUM 100 MG/1
100 TABLET ORAL DAILY
Qty: 90 TABLET | Refills: 2 | Status: SHIPPED | OUTPATIENT
Start: 2023-05-26

## 2023-05-26 RX ORDER — ATORVASTATIN CALCIUM 80 MG/1
80 TABLET, FILM COATED ORAL
Qty: 30 TABLET | Refills: 11 | Status: SHIPPED | OUTPATIENT
Start: 2023-05-26 | End: 2024-05-25

## 2023-05-26 RX ORDER — AMLODIPINE BESYLATE 10 MG/1
10 TABLET ORAL DAILY
Qty: 30 TABLET | Refills: 3 | Status: SHIPPED | OUTPATIENT
Start: 2023-05-26

## 2023-05-26 RX ORDER — EZETIMIBE 10 MG/1
10 TABLET ORAL DAILY
Qty: 30 TABLET | Refills: 11 | Status: SHIPPED | OUTPATIENT
Start: 2023-05-26 | End: 2023-06-09 | Stop reason: SDUPTHER

## 2023-05-26 RX ORDER — PANTOPRAZOLE SODIUM 40 MG/1
40 TABLET, DELAYED RELEASE ORAL DAILY
Qty: 90 TABLET | Refills: 2 | Status: SHIPPED | OUTPATIENT
Start: 2023-05-26

## 2023-05-26 NOTE — PATIENT INSTRUCTIONS
Please restart losartan 100 mg daily at night  Continue amlodipine 10 mg daily in the morning  Take atorvastatin in the morning Zetia in the morning as well for your cholesterol  Continue your insulin 50 units twice a day  Continue Jardiance and Trulicity  Use Protonix daily in the morning on empty stomach for heartburn

## 2023-05-30 NOTE — ASSESSMENT & PLAN NOTE
He follows up with endocrinologist from [facility]   There was a plan to apply CGM  He is currently on Januvia, Trulicity 5 77 mg and NovoLog 50 units twice a day  I am not completely sure that he is compliant with all his medications  His hemoglobin A1c recently was very uncontrolled  He will follow-up with us, I asked him to bring me all his medications from home for me to review    Lab Results   Component Value Date    HGBA1C 11 2 (H) 05/24/2023

## 2023-05-30 NOTE — PROGRESS NOTES
Assessment/Plan:    Gastroesophageal reflux disease  Seems to be doing okay on Protonix 40 mg daily as needed  Type 2 diabetes mellitus with stage 3 chronic kidney disease, with long-term current use of insulin, unspecified whether stage 3a or 3b CKD ([facility] [address] )  He follows up with endocrinologist from [facility]   There was a plan to apply CGM  He is currently on Januvia, Trulicity 4 65 mg and NovoLog 50 units twice a day  I am not completely sure that he is compliant with all his medications  His hemoglobin A1c recently was very uncontrolled  He will follow-up with us, I asked him to bring me all his medications from home for me to review  Lab Results   Component Value Date    HGBA1C 11 2 (H) 05/24/2023       Essential hypertension  He reported that he did not take his blood pressure medications today  He is on losartan 100 mg, amlodipine 10 mg  Blood pressure was elevated today  Discussed with him that he needs to be compliant with his meds, follow-up in few weeks  He will try to bring me his blood pressure readings from home  Continuous opioid dependence ([facility] )  He is on tramadol 50 mg every 12 hours as needed for chronic pain  We will do urine drug screen, if within normal limits prescription will be sent  Diagnoses and all orders for this visit:    Mixed hyperlipidemia  -     ezetimibe (ZETIA) 10 mg tablet; Take 1 tablet (10 mg total) by mouth daily  -     atorvastatin (LIPITOR) 80 mg tablet; Take 1 tablet (80 mg total) by mouth daily with dinner    Type 2 diabetes mellitus with hyperglycemia, with long-term current use of insulin ([facility])  -     Empagliflozin 25 MG TABS; Take 1 tablet (25 mg total) by mouth in the morning  -     Ambulatory referral to clinical pharmacy; Future    Essential hypertension  -     losartan (COZAAR) 100 MG tablet; Take 1 tablet (100 mg total) by mouth daily  -     amLODIPine (NORVASC) 10 mg tablet;  Take 1 tablet (10 mg total) by mouth daily    Continuous opioid dependence ([facility] [address] )    Type 2 diabetes mellitus with stage 3 chronic kidney disease, with long-term current use of insulin, unspecified whether stage 3a or 3b CKD (HCC)    Gastroesophageal reflux disease  -     pantoprazole (PROTONIX) 40 mg tablet; Take 1 tablet (40 mg total) by mouth daily    Opioid use  -     [facility] All Prescribed Meds and Special Instructions  -     Amphetamines, Methamphetamines  -     Butalbital  -     Phenobarbital  -     Secobarbital  -     Alprazolam  -     Clonazepam  -     Diazepam  -     Lorazepam  -     Gabapentin  -     Pregabalin  -     Cocaine  -     Heroin  -     Buprenorphine  -     Levorphanol  -     Meperidine  -     Naltrexone  -     Fentanyl  -     Methadone  -     Oxycodone  -     Tapentadol  -     THC  -     Tramadol  -     Codeine, Hydrocodone, Hydropmorphone, Morphine  -     Bath Salts  -     Ethyl Glucuronide/Ethyl Sulfate  -     Kratom  -     Spice  -     Methylphenidate  -     Phentermine  -     Validity Oxidant  -     Validity Creatinine  -     Validity pH  -     Validity Specific          Subjective:      Patient ID: Yanci Jensen is a 74 y o  male  Patient came today for follow-up on his multiple chronic problems including type 2 diabetes and essential hypertension that are currently uncontrolled        The following portions of the patient's history were reviewed and updated as appropriate: allergies, current medications, past family history, past medical history, past social history, past surgical history, and problem list     Review of Systems      Objective:      /82 (BP Location: Right arm, Patient Position: Sitting, Cuff Size: Standard)   Pulse 77   Wt 78 4 kg (172 lb 12 8 oz)   SpO2 98%   BMI 27 06 kg/m²     Allergies   Allergen Reactions   • Iodinated Contrast Media Other (See Comments)          Current Outpatient Medications:   •  albuterol (PROVENTIL HFA,VENTOLIN HFA) 90 mcg/act inhaler, INHALE 2 PUFFS BY MOUTH EVERY SIX HOURS AS NEEDED FOR WHEEZING, Disp: 6 7 g, Rfl: 0  •  amLODIPine (NORVASC) 10 mg tablet, Take 1 tablet (10 mg total) by mouth daily, Disp: 30 tablet, Rfl: 3  •  ammonium lactate (LAC-HYDRIN) 12 % cream, APPLY TOPICALLY TO AFFECTED AREA ONCE DAILY AS NEEDED FOR DRY SKIN, Disp: 385 g, Rfl: 0  •  aspirin (ECOTRIN LOW STRENGTH) 81 mg EC tablet, Take 81 mg by mouth daily, Disp: , Rfl:   •  atorvastatin (LIPITOR) 80 mg tablet, Take 1 tablet (80 mg total) by mouth daily with dinner, Disp: 30 tablet, Rfl: 11  •  Continuous Blood Gluc Sensor (FreeStyle Lucas 14 Day Sensor) MISC, , Disp: , Rfl:   •  Continuous Blood Gluc Sensor (FreeStyle Lucas 14 Day Sensor) MISC, Use 1 each every 14 (fourteen) days, Disp: , Rfl:   •  Dulaglutide 0 75 MG/0 5ML SOPN, Inject 0 75 mg under the skin once a week, Disp: , Rfl:   •  EASY COMFORT PEN NEEDLES 32G X 4 MM MISC, , Disp: , Rfl:   •  Empagliflozin 25 MG TABS, Take 1 tablet (25 mg total) by mouth in the morning, Disp: 90 tablet, Rfl: 2  •  ergocalciferol (VITAMIN D2) 50,000 units, Take 1 capsule weekly x8 weeks, then 2000 units p o  Daily  , Disp: 8 capsule, Rfl: 1  •  ezetimibe (ZETIA) 10 mg tablet, Take 1 tablet (10 mg total) by mouth daily, Disp: 30 tablet, Rfl: 11  •  glucose blood (FREESTYLE LITE) test strip, Use 1 each 3 (three) times a day Use as instructed, Disp: 300 strip, Rfl: 7  •  ibuprofen (MOTRIN) 800 mg tablet, , Disp: , Rfl:   •  insulin aspart protamine-insulin aspart (NovoLOG Mix 70/30 FlexPen) 100 Units/mL injection pen, Inject 40 units subcutaneously twice a day before meals, Disp: , Rfl:   •  Insulin Pen Needle 32G X 4 MM MISC, Use with flexpens BID, Disp: , Rfl:   •  losartan (COZAAR) 100 MG tablet, Take 1 tablet (100 mg total) by mouth daily, Disp: 90 tablet, Rfl: 2  •  Narcan 4 MG/0 1ML nasal spray, USE ONE SPRAY IN ONE NOSTRIL FOR ONE DOSE   IF THE DESIRED RESPONSE IS NOT OBTAINED AFTER 2-3 MINUTES, ADMINISTER ADDITIONAL DOSE IN OPPOSITE NOSTRIL USING NEW SPRAY, Disp: 2 each, Rfl: 0  •  pantoprazole (PROTONIX) 40 mg tablet, Take 1 tablet (40 mg total) by mouth daily, Disp: 90 tablet, Rfl: 2  •  SURE COMFORT LANCETS 30G MISC, use as directed, Disp: , Rfl:   •  tadalafil (CIALIS) 10 MG tablet, Take 10 mg by mouth, Disp: , Rfl:   •  traMADol (ULTRAM) 50 mg tablet, Take 1 tablet (50 mg total) by mouth every 12 (twelve) hours as needed for moderate pain, Disp: 60 tablet, Rfl: 0  •  triamcinolone (KENALOG) 0 1 % cream, APPLY TOPICALLY TO AFFECTED AREA TWICE A DAY AS NEEDED FOR RASH, Disp: 30 g, Rfl: 0  •  UltiCare Alcohol Swabs 70 % PADS, USE TO CLEAN THE AREAS BEFORE TESTING BLOOD SUGAR OR/AND INJECTING INSULIN ONCE DAILY, Disp: 100 each, Rfl: 8  •  Aspirin Low Dose 81 MG EC tablet, TAKE ONE TABLET BY MOUTH DAILY, Disp: 30 tablet, Rfl: 11  •  terazosin (HYTRIN) 10 MG capsule, Take 10 mg by mouth, Disp: , Rfl:      Patient Instructions   Please restart losartan 100 mg daily at night  Continue amlodipine 10 mg daily in the morning  Take atorvastatin in the morning Zetia in the morning as well for your cholesterol  Continue your insulin 50 units twice a day  Continue Jardiance and Trulicity  Use Protonix daily in the morning on empty stomach for heartburn            Physical Exam

## 2023-05-30 NOTE — ASSESSMENT & PLAN NOTE
He is on tramadol 50 mg every 12 hours as needed for chronic pain  We will do urine drug screen, if within normal limits prescription will be sent

## 2023-05-30 NOTE — ASSESSMENT & PLAN NOTE
He reported that he did not take his blood pressure medications today  He is on losartan 100 mg, amlodipine 10 mg  Blood pressure was elevated today  Discussed with him that he needs to be compliant with his meds, follow-up in few weeks  He will try to bring me his blood pressure readings from home

## 2023-06-02 LAB
6MAM UR QL CFM: NEGATIVE NG/ML
7AMINOCLONAZEPAM UR QL CFM: NEGATIVE NG/ML
A-OH ALPRAZ UR QL CFM: NEGATIVE NG/ML
ACCEPTABLE CREAT UR QL: NORMAL MG/DL
ACCEPTIBLE SP GR UR QL: NORMAL
AMPHET UR QL CFM: NEGATIVE NG/ML
BUPRENORPHINE UR QL CFM: NEGATIVE NG/ML
BUTALBITAL UR QL CFM: NEGATIVE NG/ML
BZE UR QL CFM: NEGATIVE NG/ML
CODEINE UR QL CFM: NEGATIVE NG/ML
EDDP UR QL CFM: NEGATIVE NG/ML
ETHYL GLUCURONIDE UR QL CFM: NEGATIVE NG/ML
ETHYL SULFATE UR QL SCN: NEGATIVE NG/ML
EUTYLONE UR QL: NEGATIVE NG/ML
FENTANYL UR QL CFM: NEGATIVE NG/ML
GLIADIN IGG SER IA-ACNC: NEGATIVE NG/ML
HYDROCODONE UR QL CFM: NEGATIVE NG/ML
HYDROMORPHONE UR QL CFM: NEGATIVE NG/ML
LORAZEPAM UR QL CFM: NEGATIVE NG/ML
ME-PHENIDATE UR QL CFM: NEGATIVE NG/ML
MEPERIDINE UR QL CFM: NEGATIVE NG/ML
METHADONE UR QL CFM: NEGATIVE NG/ML
METHAMPHET UR QL CFM: NEGATIVE NG/ML
MORPHINE UR QL CFM: NEGATIVE NG/ML
NALTREXONE UR QL CFM: NEGATIVE NG/ML
NITRITE UR QL: NORMAL UG/ML
NORBUPRENORPHINE UR QL CFM: NEGATIVE NG/ML
NORDIAZEPAM UR QL CFM: NEGATIVE NG/ML
NORFENTANYL UR QL CFM: NEGATIVE NG/ML
NORHYDROCODONE UR QL CFM: NEGATIVE NG/ML
NORMEPERIDINE UR QL CFM: NEGATIVE NG/ML
NOROXYCODONE UR QL CFM: NEGATIVE NG/ML
OXAZEPAM UR QL CFM: NEGATIVE NG/ML
OXYCODONE UR QL CFM: NEGATIVE NG/ML
OXYMORPHONE UR QL CFM: NEGATIVE NG/ML
PARA-FLUOROFENTANYL QUANTIFICATION: NORMAL NG/ML
PHENOBARB UR QL CFM: NEGATIVE NG/ML
RESULT ALL_PRESCRIBED MEDS AND SPECIAL INSTRUCTIONS: NORMAL
SECOBARBITAL UR QL CFM: NEGATIVE NG/ML
SL AMB 4-ANPP QUANTIFICATION: NORMAL NG/ML
SL AMB 5F-ADB-M7 METABOLITE QUANTIFICATION: NEGATIVE NG/ML
SL AMB 7-OH-MITRAGYNINE (KRATOM ALKALOID) QUANTIFICATION: NEGATIVE NG/ML
SL AMB AB-FUBINACA-M3 METABOLITE QUANTIFICATION: NEGATIVE NG/ML
SL AMB ACETYL FENTANYL QUANTIFICATION: NORMAL NG/ML
SL AMB ACETYL NORFENTANYL QUANTIFICATION: NORMAL NG/ML
SL AMB ACRYL FENTANYL QUANTIFICATION: NORMAL NG/ML
SL AMB CARFENTANIL QUANTIFICATION: NORMAL NG/ML
SL AMB CTHC (MARIJUANA METABOLITE) QUANTIFICATION: NEGATIVE NG/ML
SL AMB DEXTRORPHAN (DEXTROMETHORPHAN METABOLITE) QUANT: NEGATIVE NG/ML
SL AMB GABAPENTIN QUANTIFICATION: NEGATIVE
SL AMB JWH018 METABOLITE QUANTIFICATION: NEGATIVE NG/ML
SL AMB JWH073 METABOLITE QUANTIFICATION: NEGATIVE NG/ML
SL AMB MDMB-FUBINACA-M1 METABOLITE QUANTIFICATION: NEGATIVE NG/ML
SL AMB METHYLONE QUANTIFICATION: NEGATIVE NG/ML
SL AMB N-DESMETHYL-TRAMADOL QUANTIFICATION: NORMAL NG/ML
SL AMB PHENTERMINE QUANTIFICATION: NEGATIVE NG/ML
SL AMB PREGABALIN QUANTIFICATION: NEGATIVE
SL AMB RCS4 METABOLITE QUANTIFICATION: NEGATIVE NG/ML
SL AMB RITALINIC ACID QUANTIFICATION: NEGATIVE NG/ML
SMOOTH MUSCLE AB TITR SER IF: NEGATIVE NG/ML
SPECIMEN DRAWN SERPL: NEGATIVE NG/ML
SPECIMEN PH ACCEPTABLE UR: NORMAL
TAPENTADOL UR QL CFM: NEGATIVE NG/ML
TEMAZEPAM UR QL CFM: NEGATIVE NG/ML
TRAMADOL UR QL CFM: NORMAL NG/ML
URATE/CREAT 24H UR: NORMAL NG/ML

## 2023-06-09 ENCOUNTER — OFFICE VISIT (OUTPATIENT)
Dept: FAMILY MEDICINE CLINIC | Facility: CLINIC | Age: 73
End: 2023-06-09
Payer: MEDICARE

## 2023-06-09 VITALS
HEART RATE: 65 BPM | BODY MASS INDEX: 26.63 KG/M2 | DIASTOLIC BLOOD PRESSURE: 80 MMHG | WEIGHT: 170 LBS | SYSTOLIC BLOOD PRESSURE: 130 MMHG | OXYGEN SATURATION: 97 %

## 2023-06-09 DIAGNOSIS — E11.22 TYPE 2 DIABETES MELLITUS WITH STAGE 3 CHRONIC KIDNEY DISEASE, WITH LONG-TERM CURRENT USE OF INSULIN, UNSPECIFIED WHETHER STAGE 3A OR 3B CKD (HCC): ICD-10-CM

## 2023-06-09 DIAGNOSIS — Z12.11 SCREENING FOR COLORECTAL CANCER: ICD-10-CM

## 2023-06-09 DIAGNOSIS — E11.65 TYPE 2 DIABETES MELLITUS WITH HYPERGLYCEMIA, WITH LONG-TERM CURRENT USE OF INSULIN (HCC): Primary | ICD-10-CM

## 2023-06-09 DIAGNOSIS — I10 ESSENTIAL HYPERTENSION: ICD-10-CM

## 2023-06-09 DIAGNOSIS — M54.41 CHRONIC MIDLINE LOW BACK PAIN WITH RIGHT-SIDED SCIATICA: ICD-10-CM

## 2023-06-09 DIAGNOSIS — G89.29 CHRONIC MIDLINE LOW BACK PAIN WITH RIGHT-SIDED SCIATICA: ICD-10-CM

## 2023-06-09 DIAGNOSIS — Z12.5 PROSTATE CANCER SCREENING: ICD-10-CM

## 2023-06-09 DIAGNOSIS — Z79.4 TYPE 2 DIABETES MELLITUS WITH STAGE 3 CHRONIC KIDNEY DISEASE, WITH LONG-TERM CURRENT USE OF INSULIN, UNSPECIFIED WHETHER STAGE 3A OR 3B CKD (HCC): ICD-10-CM

## 2023-06-09 DIAGNOSIS — Z79.4 TYPE 2 DIABETES MELLITUS WITH HYPERGLYCEMIA, WITH LONG-TERM CURRENT USE OF INSULIN (HCC): Primary | ICD-10-CM

## 2023-06-09 DIAGNOSIS — Z12.12 SCREENING FOR COLORECTAL CANCER: ICD-10-CM

## 2023-06-09 DIAGNOSIS — N18.30 TYPE 2 DIABETES MELLITUS WITH STAGE 3 CHRONIC KIDNEY DISEASE, WITH LONG-TERM CURRENT USE OF INSULIN, UNSPECIFIED WHETHER STAGE 3A OR 3B CKD (HCC): ICD-10-CM

## 2023-06-09 DIAGNOSIS — E78.2 MIXED HYPERLIPIDEMIA: ICD-10-CM

## 2023-06-09 PROCEDURE — G0439 PPPS, SUBSEQ VISIT: HCPCS | Performed by: INTERNAL MEDICINE

## 2023-06-09 PROCEDURE — 99214 OFFICE O/P EST MOD 30 MIN: CPT | Performed by: INTERNAL MEDICINE

## 2023-06-09 RX ORDER — EZETIMIBE 10 MG/1
10 TABLET ORAL DAILY
Qty: 90 TABLET | Refills: 2 | Status: SHIPPED | OUTPATIENT
Start: 2023-06-09 | End: 2024-06-08

## 2023-06-09 RX ORDER — TRAMADOL HYDROCHLORIDE 50 MG/1
50 TABLET ORAL EVERY 12 HOURS PRN
Qty: 60 TABLET | Refills: 0 | Status: SHIPPED | OUTPATIENT
Start: 2023-06-09

## 2023-06-09 NOTE — PATIENT INSTRUCTIONS
Type 2 Diabetes Management for Adults   AMBULATORY CARE:   Type 2 diabetes  is a disease that affects how your body uses glucose (sugar)  Either your body cannot make enough insulin, or it cannot use the insulin correctly  It is important to keep diabetes controlled to prevent damage to your heart, blood vessels, and other organs  Management will help you feel well and enjoy your daily activities  Your diabetes care team providers can help you make a plan to fit diabetes care into your schedule  Your plan can change over time to fit your needs and your family's needs  Have someone call your local emergency number (911 in the [address]) if:   • You cannot be woken  • You have signs of diabetic ketoacidosis:     ? confusion, fatigue    ? vomiting    ? rapid heartbeat    ? fruity smelling breath    ? extreme thirst    ? dry mouth and skin    • You have any of the following signs of a heart attack:      ? Squeezing, pressure, or pain in your chest    ? You may  also have any of the following:     - Discomfort or pain in your back, neck, jaw, stomach, or arm    - Shortness of breath    - Nausea or vomiting    - Lightheadedness or a sudden cold sweat    • You have any of the following signs of a stroke:      ? Numbness or drooping on one side of your face     ? Weakness in an arm or leg    ? Confusion or difficulty speaking    ? Dizziness, a severe headache, or vision loss    Call your doctor or diabetes care team provider if:   • You have a sore or wound that will not heal     • You have a change in the amount you urinate  • Your blood sugar levels are higher than your target goals  • You often have lower blood sugar levels than your target goals  • Your skin is red, dry, warm, or swollen  • You have trouble coping with diabetes, or you feel anxious or depressed  • You have questions or concerns about your condition or care      What you need to know about high blood sugar levels:  High blood sugar levels may not cause any symptoms  You may feel more thirsty or urinate more often than usual  Over time, high blood sugar levels can damage your nerves, blood vessels, tissues, and organs  The following can increase your blood sugar levels:  • Large meals or large amounts of carbohydrates at one time    • Less physical activity    • Stress    • Illness    • A lower dose of diabetes medicine or insulin, or a late dose    What you need to know about low blood sugar levels:  Symptoms include feeling shaky, dizzy, irritable, or confused  You can prevent symptoms by keeping your blood sugar levels from going too low  • Treat a low blood sugar level right away:      ? Drink 4 ounces of juice or have 1 tube of glucose gel  ? Check your blood sugar level again 10 to 15 minutes later  ? When the level goes back to normal, eat a meal or snack to prevent another decrease  • Keep glucose gel, raisins, or hard candy with you at all times to treat a low blood sugar level  • Your blood sugar level can get too low if you take diabetes medicine or insulin and do not eat enough food  • If you use insulin, check your blood sugar level before you exercise  ? If your blood sugar level is below 100 mg/dL, eat 4 crackers or 2 ounces of raisins, or drink 4 ounces of juice  ? Check your level every 30 minutes if you exercise longer than 1 hour  ? You may need a snack during or after exercise  What you can do to manage your blood sugar levels:   • Check your blood sugar levels as directed and as needed  Several items are available to use to check your levels  You may need to check by testing a drop of blood in a glucose monitor  You may instead be given a continuous glucose monitoring (CGM) device  The device is worn at all times  The CGM checks your blood sugar level every 5 minutes  It sends results to an electronic device such as a smart phone  A CGM can be used with or without an insulin pump   You and your diabetes care team providers will decide on the best method for you  The goal for blood sugar levels before meals  is between 80 and 130 mg/dL and 2 hours after eating  is lower than 180 mg/dL  • Make healthy food choices  Work with a dietitian to develop a meal plan that works for you and your schedule  A dietitian can help you learn how to eat the right amount of carbohydrates during your meals and snacks  Carbohydrates can raise your blood sugar level if you eat too many at one time  Examples of foods that contain carbohydrates are breads, cereals, rice, pasta, and sweets  • Eat high-fiber foods as directed  Fiber helps improve blood sugar levels  Fiber also lowers your risk for heart disease and other problems diabetes can cause  Examples of high-fiber foods include vegetables, whole-grain bread, and beans such as joiner beans  Your dietitian can tell you how much fiber to have each day  • Get regular physical activity  Physical activity can help you get to your target blood sugar level goal and manage your weight  Get at least 150 minutes of moderate to vigorous aerobic physical activity each week  Do not miss more than 2 days in a row  Do not sit longer than 30 minutes at a time  Your healthcare provider can help you create an activity plan  The plan can include the best activities for you and can help you build your strength and endurance  • Maintain a healthy weight  Ask your team what a healthy weight is for you  A healthy weight can help you control diabetes and prevent heart disease  Ask your team to help you create a weight loss plan, if needed  Weight loss can help make a difference in managing diabetes  Your team will help you set a weight-loss goal, such as 10 to 15 pounds, or 5% of your extra weight  Together you and your team can set manageable weight loss goals  • Take your diabetes medicine or insulin as directed    You may need diabetes medicine, insulin, or both to help control your blood sugar levels  Your healthcare provider will teach you how and when to take your diabetes medicine or insulin  You will also be taught about side effects oral diabetes medicine can cause  Insulin may be injected or given through a pump or pen  You and your providers will decide on the best method for you:    ? An insulin pump  is an implanted device that gives your insulin 24 hours a day  An insulin pump prevents the need for multiple insulin injections in a day  ? An insulin pen  is a device prefilled with the right amount of insulin  ? You and your family members will be taught how to draw up and give insulin  if this is the best method for you  Your providers will also teach you how to dispose of needles and syringes  ? You will learn how much insulin you need  and when to give it  You will be taught when not to give insulin  You will also be taught what to do if your blood sugar level drops too low  This may happen if you take insulin and do not eat the right amount of carbohydrates  More ways to manage type 2 diabetes:   • Wear medical alert identification  Wear medical alert jewelry or carry a card that says you have diabetes  Ask your provider where to get these items  • Do not smoke  Nicotine and other chemicals in cigarettes and cigars can cause lung and blood vessel damage  It also makes it more difficult to manage your diabetes  Ask your provider for information if you currently smoke and need help to quit  Do not use e-cigarettes or smokeless tobacco in place of cigarettes or to help you quit  They still contain nicotine  • Check your feet each day for cuts, scratches, calluses, or other wounds  Look for redness and swelling, and feel for warmth  Wear shoes that fit well  Check your shoes for rocks or other objects that can hurt your feet  Do not walk barefoot or wear shoes without socks   Wear cotton socks to help keep your feet dry  • Ask about vaccines you may need  You have a higher risk for serious illness if you get the flu, pneumonia, COVID-19, or hepatitis  Ask your provider if you should get vaccines to prevent these or other diseases, and when to get the vaccines  • Talk to your provider if you become stressed about diabetes care  Sometimes being able to fit diabetes care into your life can cause increased stress  The stress can cause you not to take care of yourself properly  Your care team providers can help by offering tips about self-care  Your providers may suggest you talk to a mental health provider who can listen and offer help with self-care issues  • Have your A1c checked as directed  Your provider may check your A1c every 3 months, or 2 times each year if your diabetes is controlled  An A1c test shows the average amount of sugar in your blood over the past 2 to 3 months  Your provider will tell you what your A1c level should be  • Have screening tests as directed  Your provider may recommend screening for complications of diabetes and other conditions that may develop  Some screenings may begin right away and some may happen within the first 5 years of diagnosis:    ? Examples of diabetes complications  include kidney problems, high cholesterol, high blood pressure, blood vessel problems, eye problems, and sleep apnea  ? You may be screened for a low vitamin B level  if you take oral diabetes medicine for a long time  ? Women of childbearing years may be screened  for polycystic ovarian syndrome (PCOS)  Follow up with your doctor or diabetes care team providers as directed: You may need to have blood tests done before your follow-up visit  The test results will show if changes need to be made in your treatment or self-care  Talk to your provider if you cannot afford your medicine  Write down your questions so you remember to ask them during your visits    © Copyright [employer] 2022 Information is for End User's use only and may not be sold, redistributed or otherwise used for commercial purposes  The above information is an  only  It is not intended as medical advice for individual conditions or treatments  Talk to your doctor, nurse or pharmacist before following any medical regimen to see if it is safe and effective for you  Type 2 Diabetes Management for Adults   AMBULATORY CARE:   Type 2 diabetes  is a disease that affects how your body uses glucose (sugar)  Either your body cannot make enough insulin, or it cannot use the insulin correctly  It is important to keep diabetes controlled to prevent damage to your heart, blood vessels, and other organs  Management will help you feel well and enjoy your daily activities  Your diabetes care team providers can help you make a plan to fit diabetes care into your schedule  Your plan can change over time to fit your needs and your family's needs  Have someone call your local emergency number (911 in the [address]) if:   • You cannot be woken  • You have signs of diabetic ketoacidosis:     ? confusion, fatigue    ? vomiting    ? rapid heartbeat    ? fruity smelling breath    ? extreme thirst    ? dry mouth and skin    • You have any of the following signs of a heart attack:      ? Squeezing, pressure, or pain in your chest    ? You may  also have any of the following:     - Discomfort or pain in your back, neck, jaw, stomach, or arm    - Shortness of breath    - Nausea or vomiting    - Lightheadedness or a sudden cold sweat    • You have any of the following signs of a stroke:      ? Numbness or drooping on one side of your face     ? Weakness in an arm or leg    ? Confusion or difficulty speaking    ? Dizziness, a severe headache, or vision loss    Call your doctor or diabetes care team provider if:   • You have a sore or wound that will not heal     • You have a change in the amount you urinate      • Your blood sugar levels are higher than your target goals  • You often have lower blood sugar levels than your target goals  • Your skin is red, dry, warm, or swollen  • You have trouble coping with diabetes, or you feel anxious or depressed  • You have questions or concerns about your condition or care  What you need to know about high blood sugar levels:  High blood sugar levels may not cause any symptoms  You may feel more thirsty or urinate more often than usual  Over time, high blood sugar levels can damage your nerves, blood vessels, tissues, and organs  The following can increase your blood sugar levels:  • Large meals or large amounts of carbohydrates at one time    • Less physical activity    • Stress    • Illness    • A lower dose of diabetes medicine or insulin, or a late dose    What you need to know about low blood sugar levels:  Symptoms include feeling shaky, dizzy, irritable, or confused  You can prevent symptoms by keeping your blood sugar levels from going too low  • Treat a low blood sugar level right away:      ? Drink 4 ounces of juice or have 1 tube of glucose gel  ? Check your blood sugar level again 10 to 15 minutes later  ? When the level goes back to normal, eat a meal or snack to prevent another decrease  • Keep glucose gel, raisins, or hard candy with you at all times to treat a low blood sugar level  • Your blood sugar level can get too low if you take diabetes medicine or insulin and do not eat enough food  • If you use insulin, check your blood sugar level before you exercise  ? If your blood sugar level is below 100 mg/dL, eat 4 crackers or 2 ounces of raisins, or drink 4 ounces of juice  ? Check your level every 30 minutes if you exercise longer than 1 hour  ? You may need a snack during or after exercise  What you can do to manage your blood sugar levels:   • Check your blood sugar levels as directed and as needed    Several items are available to use to check your levels  You may need to check by testing a drop of blood in a glucose monitor  You may instead be given a continuous glucose monitoring (CGM) device  The device is worn at all times  The CGM checks your blood sugar level every 5 minutes  It sends results to an electronic device such as a smart phone  A CGM can be used with or without an insulin pump  You and your diabetes care team providers will decide on the best method for you  The goal for blood sugar levels before meals  is between 80 and 130 mg/dL and 2 hours after eating  is lower than 180 mg/dL  • Make healthy food choices  Work with a dietitian to develop a meal plan that works for you and your schedule  A dietitian can help you learn how to eat the right amount of carbohydrates during your meals and snacks  Carbohydrates can raise your blood sugar level if you eat too many at one time  Examples of foods that contain carbohydrates are breads, cereals, rice, pasta, and sweets  • Eat high-fiber foods as directed  Fiber helps improve blood sugar levels  Fiber also lowers your risk for heart disease and other problems diabetes can cause  Examples of high-fiber foods include vegetables, whole-grain bread, and beans such as joiner beans  Your dietitian can tell you how much fiber to have each day  • Get regular physical activity  Physical activity can help you get to your target blood sugar level goal and manage your weight  Get at least 150 minutes of moderate to vigorous aerobic physical activity each week  Do not miss more than 2 days in a row  Do not sit longer than 30 minutes at a time  Your healthcare provider can help you create an activity plan  The plan can include the best activities for you and can help you build your strength and endurance  • Maintain a healthy weight  Ask your team what a healthy weight is for you  A healthy weight can help you control diabetes and prevent heart disease   Ask your team to help you create a weight loss plan, if needed  Weight loss can help make a difference in managing diabetes  Your team will help you set a weight-loss goal, such as 10 to 15 pounds, or 5% of your extra weight  Together you and your team can set manageable weight loss goals  • Take your diabetes medicine or insulin as directed  You may need diabetes medicine, insulin, or both to help control your blood sugar levels  Your healthcare provider will teach you how and when to take your diabetes medicine or insulin  You will also be taught about side effects oral diabetes medicine can cause  Insulin may be injected or given through a pump or pen  You and your providers will decide on the best method for you:    ? An insulin pump  is an implanted device that gives your insulin 24 hours a day  An insulin pump prevents the need for multiple insulin injections in a day  ? An insulin pen  is a device prefilled with the right amount of insulin  ? You and your family members will be taught how to draw up and give insulin  if this is the best method for you  Your providers will also teach you how to dispose of needles and syringes  ? You will learn how much insulin you need  and when to give it  You will be taught when not to give insulin  You will also be taught what to do if your blood sugar level drops too low  This may happen if you take insulin and do not eat the right amount of carbohydrates  More ways to manage type 2 diabetes:   • Wear medical alert identification  Wear medical alert jewelry or carry a card that says you have diabetes  Ask your provider where to get these items  • Do not smoke  Nicotine and other chemicals in cigarettes and cigars can cause lung and blood vessel damage  It also makes it more difficult to manage your diabetes  Ask your provider for information if you currently smoke and need help to quit   Do not use e-cigarettes or smokeless tobacco in place of cigarettes or to help you quit  They still contain nicotine  • Check your feet each day for cuts, scratches, calluses, or other wounds  Look for redness and swelling, and feel for warmth  Wear shoes that fit well  Check your shoes for rocks or other objects that can hurt your feet  Do not walk barefoot or wear shoes without socks  Wear cotton socks to help keep your feet dry  • Ask about vaccines you may need  You have a higher risk for serious illness if you get the flu, pneumonia, COVID-19, or hepatitis  Ask your provider if you should get vaccines to prevent these or other diseases, and when to get the vaccines  • Talk to your provider if you become stressed about diabetes care  Sometimes being able to fit diabetes care into your life can cause increased stress  The stress can cause you not to take care of yourself properly  Your care team providers can help by offering tips about self-care  Your providers may suggest you talk to a mental health provider who can listen and offer help with self-care issues  • Have your A1c checked as directed  Your provider may check your A1c every 3 months, or 2 times each year if your diabetes is controlled  An A1c test shows the average amount of sugar in your blood over the past 2 to 3 months  Your provider will tell you what your A1c level should be  • Have screening tests as directed  Your provider may recommend screening for complications of diabetes and other conditions that may develop  Some screenings may begin right away and some may happen within the first 5 years of diagnosis:    ? Examples of diabetes complications  include kidney problems, high cholesterol, high blood pressure, blood vessel problems, eye problems, and sleep apnea  ? You may be screened for a low vitamin B level  if you take oral diabetes medicine for a long time  ? Women of childbearing years may be screened  for polycystic ovarian syndrome (PCOS)      Follow up with your doctor or diabetes care team providers as directed: You may need to have blood tests done before your follow-up visit  The test results will show if changes need to be made in your treatment or self-care  Talk to your provider if you cannot afford your medicine  Write down your questions so you remember to ask them during your visits  © [employer] 2022 Information is for End User's use only and may not be sold, redistributed or otherwise used for commercial purposes  The above information is an  only  It is not intended as medical advice for individual conditions or treatments  Talk to your doctor, nurse or pharmacist before following any medical regimen to see if it is safe and effective for you

## 2023-06-09 NOTE — PROGRESS NOTES
Diabetic Foot Exam    Patient's shoes and socks removed  Right Foot/Ankle   Right Foot Inspection  Skin Exam: skin normal and skin intact  No dry skin, no warmth, no callus, no erythema, no maceration, no abnormal color, no pre-ulcer, no ulcer and no callus  Toe Exam: ROM and strength within normal limits  Sensory   Monofilament testing: intact    Vascular  Capillary refills: < 3 seconds  The right DP pulse is 1+  The right PT pulse is 1+  Left Foot/Ankle  Left Foot Inspection  Skin Exam: skin normal and skin intact  No dry skin, no warmth, no erythema, no maceration, normal color, no pre-ulcer, no ulcer and no callus  Toe Exam: ROM and strength within normal limits  Sensory   Monofilament testing: intact    Vascular  Capillary refills: < 3 seconds  The left DP pulse is 1+  The left PT pulse is 1+       Assign Risk Category  No deformity present  No loss of protective sensation  Weak pulses  Risk: 2

## 2023-06-09 NOTE — PROGRESS NOTES
Assessment and Plan:     Problem List Items Addressed This Visit        Endocrine    Type 2 diabetes mellitus with stage 3 chronic kidney disease, with long-term current use of insulin, unspecified whether stage 3a or 3b CKD ([facility] [address] )     Continue follow-up with endocrinology  Lab Results   Component Value Date    HGBA1C 11 2 (H) 05/24/2023               Cardiovascular and Mediastinum    Essential hypertension     Blood pressure is well controlled  Continue current meds  Recheck BMP with the next blood work  Nervous and Auditory    Chronic midline low back pain with right-sided sciatica     He is doing well on tramadol, urine drug screen is consistent with medications that he takes  Denies any side effects  Relevant Medications    traMADol (ULTRAM) 50 mg tablet       Other    Mixed hyperlipidemia     Continue Zetia and atorvastatin  We will recheck lipid panel with the next blood work  He was not very compliant with his medications recently so his LDL went up to 170  Discussed that he needs to take meds daily  Relevant Medications    ezetimibe (ZETIA) 10 mg tablet    Other Relevant Orders    Lipid panel   Other Visit Diagnoses     Type 2 diabetes mellitus with hyperglycemia, with long-term current use of insulin ([facility] )    -  Primary    Relevant Orders    Basic metabolic panel    Screening for colorectal cancer        Relevant Orders    Ambulatory referral to Gastroenterology    Prostate cancer screening        Relevant Orders    PSA, Total Screen        BMI Counseling: Body mass index is 26 63 kg/m²  The BMI is above normal  Exercise recommendations include exercising 3-5 times per week  Rationale for BMI follow-up plan is due to patient being overweight or obese  Depression Screening and Follow-up Plan: Patient was screened for depression during today's encounter  They screened negative with a PHQ-2 score of 0        Preventive health issues were discussed with patient, and age appropriate screening tests were ordered as noted in patient's After Visit Summary  Personalized health advice and appropriate referrals for health education or preventive services given if needed, as noted in patient's After Visit Summary  History of Present Illness:     Patient presents for a Medicare Wellness Visit    Patient came today for Medicare wellness visit and to follow-up on his chronic problems  He is up-to-date on his vaccinations except of Shingrix, he would like to hold off as for now  He agreed for colonoscopy and PSA  He is trying to eat healthy, no formal exercise  Patient Care Team:  Sharif Arias MD as PCP - General (Internal Medicine)  [facility] (Podiatry)     Review of Systems:     Review of Systems   Constitutional: Negative for activity change, chills, fatigue and fever  HENT: Negative for congestion, ear pain, sinus pressure and sore throat  Eyes: Negative for pain and visual disturbance  Respiratory: Negative for cough, chest tightness, shortness of breath and wheezing  Cardiovascular: Negative for chest pain, palpitations and leg swelling  Gastrointestinal: Negative for abdominal pain, blood in stool, constipation, diarrhea, nausea and vomiting  Endocrine: Negative for polydipsia and polyuria  Genitourinary: Negative for difficulty urinating, dysuria, frequency and urgency  Musculoskeletal: Positive for arthralgias and back pain  Negative for joint swelling and myalgias  Skin: Negative for rash  Neurological: Negative for dizziness, weakness, numbness and headaches  Hematological: Negative for adenopathy  Does not bruise/bleed easily  Psychiatric/Behavioral: Negative for dysphoric mood  The patient is not nervous/anxious           Problem List:     Patient Active Problem List   Diagnosis   • Mixed hyperlipidemia   • Gastroesophageal reflux disease   • Type 2 diabetes mellitus with stage 3 chronic kidney disease, with long-term current use of insulin, unspecified whether stage 3a or 3b CKD ([place] [address] )   • Essential hypertension   • Right lower quadrant abdominal pain   • Chronic midline low back pain with right-sided sciatica   • Benign non-nodular prostatic hyperplasia without lower urinary tract symptoms   • Left varicocele   • Microhematuria   • CKD (chronic kidney disease) stage 2, GFR 60-89 ml/min   • Wellness examination   • Screening for AAA (abdominal aortic aneurysm)   • Encounter for screening colonoscopy   • Encounter for screening for lung cancer   • Hearing deficit, left   • Overweight with body mass index (BMI) of 29 to 29 9 in adult   • Diabetic polyneuropathy associated with type 2 diabetes mellitus ([place] [address] )   • Encounter for immunization   • Erectile dysfunction   • Vitamin D deficiency   • Continuous opioid dependence ([place] [address] )      Past Medical and Surgical History:     Past Medical History:   Diagnosis Date   • Diabetes mellitus ([place] [address] )      Past Surgical History:   Procedure Laterality Date   • APPENDECTOMY Right 1970    PARAMEDIAN INCISION      Family History:     No family history on file     Social History:     Social History     Socioeconomic History   • Marital status: /Civil Union     Spouse name: None   • Number of children: None   • Years of education: None   • Highest education level: None   Occupational History   • None   Tobacco Use   • Smoking status: Former     Types: Cigarettes   • Smokeless tobacco: Former   • Tobacco comments:     Quit 2001   Substance and Sexual Activity   • Alcohol use: Never   • Drug use: Never   • Sexual activity: None   Other Topics Concern   • None   Social History Narrative   • None     Social Determinants of Health     Financial Resource Strain: Low Risk  (6/9/2023)    Overall Financial Resource Strain (CARDIA)    • Difficulty of Paying Living Expenses: Not hard at all   Food Insecurity: Not on file   Transportation Needs: No Transportation Needs (6/9/2023)    PRAPARE - Transportation    • Lack of Transportation (Medical): No    • Lack of Transportation (Non-Medical): No   Physical Activity: Not on file   Stress: Not on file   Social Connections: Not on file   Intimate Partner Violence: Not on file   Housing Stability: Not on file      Medications and Allergies:     Current Outpatient Medications   Medication Sig Dispense Refill   • albuterol (PROVENTIL HFA,VENTOLIN HFA) 90 mcg/act inhaler INHALE 2 PUFFS BY MOUTH EVERY SIX HOURS AS NEEDED FOR WHEEZING 6 7 g 0   • amLODIPine (NORVASC) 10 mg tablet Take 1 tablet (10 mg total) by mouth daily 30 tablet 3   • ammonium lactate (LAC-HYDRIN) 12 % cream APPLY TOPICALLY TO AFFECTED AREA ONCE DAILY AS NEEDED FOR DRY SKIN 385 g 0   • aspirin (ECOTRIN LOW STRENGTH) 81 mg EC tablet Take 81 mg by mouth daily     • Aspirin Low Dose 81 MG EC tablet TAKE ONE TABLET BY MOUTH DAILY 30 tablet 11   • atorvastatin (LIPITOR) 80 mg tablet Take 1 tablet (80 mg total) by mouth daily with dinner 30 tablet 11   • Continuous Blood Gluc Sensor (FreeStyle Lucas 14 Day Sensor) MISC      • Continuous Blood Gluc Sensor (FreeStyle Lucas 14 Day Sensor) MISC Use 1 each every 14 (fourteen) days     • Dulaglutide 0 75 MG/0 5ML SOPN Inject 0 75 mg under the skin once a week     • EASY COMFORT PEN NEEDLES 32G X 4 MM MISC      • Empagliflozin 25 MG TABS Take 1 tablet (25 mg total) by mouth in the morning 90 tablet 2   • ergocalciferol (VITAMIN D2) 50,000 units Take 1 capsule weekly x8 weeks, then 2000 units p o  Daily   8 capsule 1   • ezetimibe (ZETIA) 10 mg tablet Take 1 tablet (10 mg total) by mouth daily 90 tablet 2   • glucose blood (FREESTYLE LITE) test strip Use 1 each 3 (three) times a day Use as instructed 300 strip 7   • ibuprofen (MOTRIN) 800 mg tablet      • insulin aspart protamine-insulin aspart (NovoLOG Mix 70/30 FlexPen) 100 Units/mL injection pen Inject 40 units subcutaneously twice a day before meals     • Insulin Pen Needle 32G X 4 MM MISC Use with flexpens BID     • losartan (COZAAR) 100 MG tablet Take 1 tablet (100 mg total) by mouth daily 90 tablet 2   • Narcan 4 MG/0 1ML nasal spray USE ONE SPRAY IN ONE NOSTRIL FOR ONE DOSE  IF THE DESIRED RESPONSE IS NOT OBTAINED AFTER 2-3 MINUTES, ADMINISTER ADDITIONAL DOSE IN OPPOSITE NOSTRIL USING NEW SPRAY 2 each 0   • pantoprazole (PROTONIX) 40 mg tablet Take 1 tablet (40 mg total) by mouth daily 90 tablet 2   • SURE COMFORT LANCETS 30G MISC use as directed     • tadalafil (CIALIS) 10 MG tablet Take 10 mg by mouth     • traMADol (ULTRAM) 50 mg tablet Take 1 tablet (50 mg total) by mouth every 12 (twelve) hours as needed for moderate pain 60 tablet 0   • triamcinolone (KENALOG) 0 1 % cream APPLY TOPICALLY TO AFFECTED AREA TWICE A DAY AS NEEDED FOR RASH 30 g 0   • UltiCare Alcohol Swabs 70 % PADS USE TO CLEAN THE AREAS BEFORE TESTING BLOOD SUGAR OR/AND INJECTING INSULIN ONCE DAILY 100 each 8   • terazosin (HYTRIN) 10 MG capsule Take 10 mg by mouth       No current facility-administered medications for this visit  Allergies   Allergen Reactions   • Iodinated Contrast Media Other (See Comments)      Immunizations:     Immunization History   Administered Date(s) Administered   • COVID-19 J&J (Eric) vaccine 0 5 mL 04/09/2021   • INFLUENZA 10/23/2008, 10/12/2009, 11/12/2010, 11/02/2011, 09/19/2012, 11/15/2013, 10/29/2014, 11/02/2015, 10/20/2016   • Influenza, high dose seasonal 0 7 mL 02/04/2019, 11/25/2019, 01/22/2021, 10/19/2021, 10/21/2022   • Pneumococcal Conjugate 13-Valent 10/20/2016   • Pneumococcal Polysaccharide PPV23 02/20/2009   • Tdap 02/20/2009, 01/22/2021      Health Maintenance:         Topic Date Due   • Colorectal Cancer Screening  Never done   • Hepatitis C Screening  Completed     There are no preventive care reminders to display for this patient  Medicare Screening Tests and Risk Assessments:     Hector Bolton is here for his Subsequent Wellness visit       Health Risk Assessment:   Patient rates overall health as very good  Patient feels that their physical health rating is same  Patient is satisfied with their life  Eyesight was rated as same  Hearing was rated as same  Patient feels that their emotional and mental health rating is same  Patients states they are sometimes angry  Patient states they are sometimes unusually tired/fatigued  Pain experienced in the last 7 days has been none  Patient states that he has experienced no weight loss or gain in last 6 months  Fall Risk Screening: In the past year, patient has experienced: no history of falling in past year      Home Safety:  Patient does not have trouble with stairs inside or outside of their home  Patient has working smoke alarms and has working carbon monoxide detector  Home safety hazards include: none  Nutrition:   Current diet is Regular  Medications:   Patient is not currently taking any over-the-counter supplements  Patient is able to manage medications  Activities of Daily Living (ADLs)/Instrumental Activities of Daily Living (IADLs):   Walk and transfer into and out of bed and chair?: Yes  Dress and groom yourself?: Yes    Bathe or shower yourself?: Yes    Feed yourself?  Yes  Do your laundry/housekeeping?: Yes  Manage your money, pay your bills and track your expenses?: Yes  Make your own meals?: Yes    Do your own shopping?: Yes    Previous Hospitalizations:   Any hospitalizations or ED visits within the last 12 months?: No      Advance Care Planning:   Living will: No    Durable POA for healthcare: No    Advanced directive: No      PREVENTIVE SCREENINGS      Cardiovascular Screening:    General: Screening Not Indicated and History Lipid Disorder      Diabetes Screening:     General: Screening Not Indicated and History Diabetes      Abdominal Aortic Aneurysm (AAA) Screening:    Risk factors include: age between 73-67 yo and tobacco use        Lung Cancer Screening:     General: Screening Not Indicated      Hepatitis C Screening:    General: Screening Current    Screening, Brief Intervention, and Referral to Treatment (SBIRT)    Screening  Typical number of drinks in a day: 0  Typical number of drinks in a week: 0  Interpretation: Low risk drinking behavior  Single Item Drug Screening:  How often have you used an illegal drug (including marijuana) or a prescription medication for non-medical reasons in the past year? never    Single Item Drug Screen Score: 0  Interpretation: Negative screen for possible drug use disorder    No results found  Physical Exam:     /80 (BP Location: Left arm, Patient Position: Sitting, Cuff Size: Standard)   Pulse 65   Wt 77 1 kg (170 lb)   SpO2 97%   BMI 26 63 kg/m²     Physical Exam  Vitals reviewed  Constitutional:       Appearance: Normal appearance  Eyes:      Pupils: Pupils are equal, round, and reactive to light  Cardiovascular:      Rate and Rhythm: Normal rate and regular rhythm  Pulses:           Posterior tibial pulses are 1+ on the right side and 1+ on the left side  Heart sounds: No murmur heard  No friction rub  No gallop  Pulmonary:      Effort: Pulmonary effort is normal  No respiratory distress  Breath sounds: Normal breath sounds  No wheezing, rhonchi or rales  Abdominal:      General: There is no distension  Tenderness: There is no abdominal tenderness  There is no guarding or rebound  Musculoskeletal:         General: Tenderness present  No swelling  Feet:      Right foot:      Skin integrity: No ulcer, skin breakdown, erythema, warmth, callus or dry skin  Left foot:      Skin integrity: No ulcer, skin breakdown, erythema, warmth, callus or dry skin  Skin:     Coloration: Skin is not jaundiced or pale  Findings: No rash  Neurological:      Mental Status: He is alert     Psychiatric:         Mood and Affect: Mood normal          Behavior: Behavior normal           [facility] [employer], MDDiabetic Foot Exam    Patient's shoes and socks removed  Right Foot/Ankle   Right Foot Inspection  Skin Exam: skin normal and skin intact  No dry skin, no warmth, no callus, no erythema, no maceration, no abnormal color, no pre-ulcer, no ulcer and no callus  Toe Exam: ROM and strength within normal limits  Sensory   Monofilament testing: intact    Vascular  The right PT pulse is 1+  Left Foot/Ankle  Left Foot Inspection  Skin Exam: skin normal and skin intact  No dry skin, no warmth, no erythema, no maceration, normal color, no pre-ulcer, no ulcer and no callus  Toe Exam: ROM and strength within normal limits  Sensory   Monofilament testing: intact    Vascular  The left PT pulse is 1+

## 2023-06-09 NOTE — ASSESSMENT & PLAN NOTE
Continue Zetia and atorvastatin  We will recheck lipid panel with the next blood work  He was not very compliant with his medications recently so his LDL went up to 170  Discussed that he needs to take meds daily

## 2023-06-09 NOTE — ASSESSMENT & PLAN NOTE
He is doing well on tramadol, urine drug screen is consistent with medications that he takes  Denies any side effects

## 2023-06-09 NOTE — ASSESSMENT & PLAN NOTE
Continue follow-up with endocrinology    Lab Results   Component Value Date    HGBA1C 11 2 (H) 05/24/2023

## 2023-06-12 ENCOUNTER — TELEPHONE (OUTPATIENT)
Dept: FAMILY MEDICINE CLINIC | Facility: CLINIC | Age: 73
End: 2023-06-12

## 2023-06-12 NOTE — TELEPHONE ENCOUNTER
[id number]  89[facility] CALLED TO INFORM DR DUNLAP THAT PATIENTS TRAMADOL 50MG TAKE 1 TAB BID NEEDS A PRIO AUTH THEY WILL ONLY COVER A 7DAY SUPPLY

## 2023-06-15 NOTE — TELEPHONE ENCOUNTER
Dr Jyoti Franks: I spoke with the pharmacy regarding this prior auth need for Tramadol Hcl 50 mg tabs  Prior Gsutavo Vásquez was sent; however, per pharmacist, insurance will cover a 7 tablet only fill first and then cover the remaining refill after  Please advise if the medication may be dispensed that way, or if we should await prior auth response to dispense 60 tablets at once  Thank you

## 2023-06-20 DIAGNOSIS — M54.41 CHRONIC MIDLINE LOW BACK PAIN WITH RIGHT-SIDED SCIATICA: ICD-10-CM

## 2023-06-20 DIAGNOSIS — G89.29 CHRONIC MIDLINE LOW BACK PAIN WITH RIGHT-SIDED SCIATICA: ICD-10-CM

## 2023-06-20 NOTE — TELEPHONE ENCOUNTER
He said he would like [facility] in [place]  I put it in to health call with the corrected pharmacy

## 2023-06-21 RX ORDER — TRAMADOL HYDROCHLORIDE 50 MG/1
50 TABLET ORAL EVERY 12 HOURS PRN
Qty: 60 TABLET | Refills: 0 | OUTPATIENT
Start: 2023-06-21

## 2023-08-16 DIAGNOSIS — I10 ESSENTIAL HYPERTENSION: ICD-10-CM

## 2023-08-16 RX ORDER — AMLODIPINE BESYLATE 10 MG/1
10 TABLET ORAL DAILY
Qty: 30 TABLET | Refills: 2 | Status: SHIPPED | OUTPATIENT
Start: 2023-08-16

## 2023-10-23 DIAGNOSIS — I10 ESSENTIAL HYPERTENSION: ICD-10-CM

## 2023-10-23 RX ORDER — AMLODIPINE BESYLATE 10 MG/1
10 TABLET ORAL DAILY
Qty: 30 TABLET | Refills: 1 | Status: SHIPPED | OUTPATIENT
Start: 2023-10-23

## 2023-11-03 DIAGNOSIS — M54.41 CHRONIC MIDLINE LOW BACK PAIN WITH RIGHT-SIDED SCIATICA: ICD-10-CM

## 2023-11-03 DIAGNOSIS — G89.29 CHRONIC MIDLINE LOW BACK PAIN WITH RIGHT-SIDED SCIATICA: ICD-10-CM

## 2023-11-06 RX ORDER — TRAMADOL HYDROCHLORIDE 50 MG/1
50 TABLET ORAL EVERY 12 HOURS PRN
Qty: 60 TABLET | Refills: 0 | Status: SHIPPED | OUTPATIENT
Start: 2023-11-06

## 2023-11-20 DIAGNOSIS — Z79.4 TYPE 2 DIABETES MELLITUS WITH HYPERGLYCEMIA, WITH LONG-TERM CURRENT USE OF INSULIN (HCC): ICD-10-CM

## 2023-11-20 DIAGNOSIS — E11.65 TYPE 2 DIABETES MELLITUS WITH HYPERGLYCEMIA, WITH LONG-TERM CURRENT USE OF INSULIN (HCC): ICD-10-CM

## 2023-11-21 RX ORDER — ALCOHOL ANTISEPTIC PADS
PADS, MEDICATED (EA) TOPICAL
Qty: 100 EACH | Refills: 7 | Status: SHIPPED | OUTPATIENT
Start: 2023-11-21

## 2023-12-11 DIAGNOSIS — I10 ESSENTIAL HYPERTENSION: ICD-10-CM

## 2023-12-11 RX ORDER — AMLODIPINE BESYLATE 10 MG/1
10 TABLET ORAL DAILY
Qty: 30 TABLET | Refills: 0 | Status: SHIPPED | OUTPATIENT
Start: 2023-12-11

## 2023-12-13 ENCOUNTER — TELEPHONE (OUTPATIENT)
Dept: ADMINISTRATIVE | Facility: OTHER | Age: 73
End: 2023-12-13

## 2023-12-13 ENCOUNTER — OFFICE VISIT (OUTPATIENT)
Dept: FAMILY MEDICINE CLINIC | Facility: CLINIC | Age: 73
End: 2023-12-13
Payer: MEDICARE

## 2023-12-13 VITALS
BODY MASS INDEX: 27 KG/M2 | WEIGHT: 172.4 LBS | RESPIRATION RATE: 12 BRPM | OXYGEN SATURATION: 98 % | HEART RATE: 85 BPM | DIASTOLIC BLOOD PRESSURE: 80 MMHG | SYSTOLIC BLOOD PRESSURE: 150 MMHG

## 2023-12-13 DIAGNOSIS — F11.90 OPIOID USE: ICD-10-CM

## 2023-12-13 DIAGNOSIS — G62.9 PERIPHERAL POLYNEUROPATHY: ICD-10-CM

## 2023-12-13 DIAGNOSIS — M54.41 CHRONIC MIDLINE LOW BACK PAIN WITH RIGHT-SIDED SCIATICA: ICD-10-CM

## 2023-12-13 DIAGNOSIS — I10 ESSENTIAL HYPERTENSION: ICD-10-CM

## 2023-12-13 DIAGNOSIS — F11.20 CONTINUOUS OPIOID DEPENDENCE (HCC): ICD-10-CM

## 2023-12-13 DIAGNOSIS — Z23 ENCOUNTER FOR IMMUNIZATION: Primary | ICD-10-CM

## 2023-12-13 DIAGNOSIS — Z79.4 TYPE 2 DIABETES MELLITUS WITH STAGE 3 CHRONIC KIDNEY DISEASE, WITH LONG-TERM CURRENT USE OF INSULIN, UNSPECIFIED WHETHER STAGE 3A OR 3B CKD (HCC): ICD-10-CM

## 2023-12-13 DIAGNOSIS — Z79.4 TYPE 2 DIABETES MELLITUS WITH HYPERGLYCEMIA, WITH LONG-TERM CURRENT USE OF INSULIN (HCC): ICD-10-CM

## 2023-12-13 DIAGNOSIS — N18.30 TYPE 2 DIABETES MELLITUS WITH STAGE 3 CHRONIC KIDNEY DISEASE, WITH LONG-TERM CURRENT USE OF INSULIN, UNSPECIFIED WHETHER STAGE 3A OR 3B CKD (HCC): ICD-10-CM

## 2023-12-13 DIAGNOSIS — Z12.11 COLON CANCER SCREENING: ICD-10-CM

## 2023-12-13 DIAGNOSIS — E11.22 TYPE 2 DIABETES MELLITUS WITH STAGE 3 CHRONIC KIDNEY DISEASE, WITH LONG-TERM CURRENT USE OF INSULIN, UNSPECIFIED WHETHER STAGE 3A OR 3B CKD (HCC): ICD-10-CM

## 2023-12-13 DIAGNOSIS — R06.02 SHORTNESS OF BREATH: ICD-10-CM

## 2023-12-13 DIAGNOSIS — E11.65 TYPE 2 DIABETES MELLITUS WITH HYPERGLYCEMIA, WITH LONG-TERM CURRENT USE OF INSULIN (HCC): ICD-10-CM

## 2023-12-13 DIAGNOSIS — G89.29 CHRONIC MIDLINE LOW BACK PAIN WITH RIGHT-SIDED SCIATICA: ICD-10-CM

## 2023-12-13 LAB — SL AMB POCT HEMOGLOBIN AIC: 11.4 (ref ?–6.5)

## 2023-12-13 PROCEDURE — 99214 OFFICE O/P EST MOD 30 MIN: CPT | Performed by: INTERNAL MEDICINE

## 2023-12-13 PROCEDURE — G0008 ADMIN INFLUENZA VIRUS VAC: HCPCS

## 2023-12-13 PROCEDURE — 90662 IIV NO PRSV INCREASED AG IM: CPT

## 2023-12-13 PROCEDURE — 83036 HEMOGLOBIN GLYCOSYLATED A1C: CPT | Performed by: INTERNAL MEDICINE

## 2023-12-13 RX ORDER — TRAMADOL HYDROCHLORIDE 50 MG/1
50 TABLET ORAL EVERY 12 HOURS PRN
Qty: 60 TABLET | Refills: 0 | Status: SHIPPED | OUTPATIENT
Start: 2023-12-13

## 2023-12-13 RX ORDER — BLOOD-GLUCOSE METER
1 KIT MISCELLANEOUS 3 TIMES DAILY
Qty: 300 STRIP | Refills: 7 | Status: SHIPPED | OUTPATIENT
Start: 2023-12-13

## 2023-12-13 RX ORDER — METFORMIN HYDROCHLORIDE 750 MG/1
750 TABLET, EXTENDED RELEASE ORAL
Qty: 90 TABLET | Refills: 0 | Status: SHIPPED | OUTPATIENT
Start: 2023-12-13

## 2023-12-13 RX ORDER — ALBUTEROL SULFATE 90 UG/1
2 AEROSOL, METERED RESPIRATORY (INHALATION) EVERY 6 HOURS PRN
Qty: 18 G | Refills: 3 | Status: SHIPPED | OUTPATIENT
Start: 2023-12-13

## 2023-12-13 RX ORDER — GABAPENTIN 300 MG/1
300 CAPSULE ORAL 3 TIMES DAILY
Qty: 180 CAPSULE | Refills: 1 | Status: SHIPPED | OUTPATIENT
Start: 2023-12-13

## 2023-12-13 NOTE — ASSESSMENT & PLAN NOTE
Lab Results   Component Value Date    HGBA1C 11.4 (A) 12/13/2023   Hemoglobin A1c is elevated, today was 11.4. He is not very compliant with his low-carb diet. He follows up with endocrinology. Currently he is on 50 units of NovoLog twice a day, Trulicity 4.43 mg and Jardiance. He reported that he could not tolerate higher dose due to diarrhea. Continue follow-up with endocrinology, he will try to be more compliant with his low-carb diet. Will also add metformin XR once a day as he could not tolerate regular metformin in the past due to diarrhea as well.

## 2023-12-13 NOTE — ASSESSMENT & PLAN NOTE
Continue current dose of amlodipine, losartan. Blood pressure is slightly elevated today. He will continue to monitor at home, limit salt intake. He will bring me his blood pressure cuff and blood pressure readings for the next appointment.

## 2023-12-13 NOTE — LETTER
Diabetic Eye Exam Form    Date Requested: 23  Patient: Wagner Ochoa  Patient : [id number]   Referring Provider: Ricardo Presley MD      DIABETIC Eye Exam Date _______________________________      Type of Exam MUST be documented for Diabetic Eye Exams. Please CHECK ONE.     Retinal Exam       Dilated Retinal Exam       OCT       Optomap-Iris Exam      Fundus Photography       Left Eye - Please check Retinopathy or No Retinopathy        Exam did show retinopathy    Exam did not show retinopathy       Right Eye - Please check Retinopathy or No Retinopathy       Exam did show retinopathy    Exam did not show retinopathy       Comments __________________________________________________________    Practice Providing Exam ______________________________________________    Exam Performed By (print name) _______________________________________      Provider Signature ___________________________________________________      These reports are needed for  compliance.  Please fax this completed form and a copy of the Diabetic Eye Exam report to our office located at [address] [place] [address] as soon as possible via Fax [phone] attention Jone: Phone [phone]  We thank you for your assistance in treating our mutual patient.

## 2023-12-13 NOTE — TELEPHONE ENCOUNTER
Upon review of the In Basket request and the patient's chart, initial outreach has been made via fax to facility. Please see Contacts section for details.     Thank you  Jone Gomez MA

## 2023-12-13 NOTE — ASSESSMENT & PLAN NOTE
He is using tramadol very sparingly, denies any side effects, reports significant benefit of medication. Will do urine drug screen. His last dose of tramadol was 2 days ago.

## 2023-12-13 NOTE — PROGRESS NOTES
Assessment/Plan:    Type 2 diabetes mellitus with stage 3 chronic kidney disease, with long-term current use of insulin, unspecified whether stage 3a or 3b CKD ([address])    Lab Results   Component Value Date    HGBA1C 11.4 (A) 12/13/2023   Hemoglobin A1c is elevated, today was 11.4. He is not very compliant with his low-carb diet. He follows up with endocrinology. Currently he is on 50 units of NovoLog twice a day, Trulicity 5.93 mg and Jardiance. He reported that he could not tolerate higher dose due to diarrhea. Continue follow-up with endocrinology, he will try to be more compliant with his low-carb diet. Will also add metformin XR once a day as he could not tolerate regular metformin in the past due to diarrhea as well. Essential hypertension  Continue current dose of amlodipine, losartan. Blood pressure is slightly elevated today. He will continue to monitor at home, limit salt intake. He will bring me his blood pressure cuff and blood pressure readings for the next appointment. Chronic midline low back pain with right-sided sciatica  He is using tramadol very sparingly, denies any side effects, reports significant benefit of medication. Will do urine drug screen. His last dose of tramadol was 2 days ago. Peripheral polyneuropathy  Will start him on gabapentin 300 mg, will taper up dose slowly, side effects discussed. Diagnoses and all orders for this visit:    Encounter for immunization  -     influenza vaccine, high-dose, PF 0.7 mL (FLUZONE HIGH-DOSE)    Type 2 diabetes mellitus with stage 3 chronic kidney disease, with long-term current use of insulin, unspecified whether stage 3a or 3b CKD ([facility])  -     POCT hemoglobin A1c  -     metFORMIN (GLUCOPHAGE-XR) 750 mg 24 hr tablet;  Take 1 tablet (750 mg total) by mouth daily with breakfast    Opioid use  -     [facility] All Prescribed Meds and Special Instructions  -     Amphetamines, Methamphetamines  -     Butalbital  - Phenobarbital  -     Secobarbital  -     Alprazolam  -     Clonazepam  -     Diazepam  -     Lorazepam  -     Gabapentin  -     Pregabalin  -     Cocaine  -     Heroin  -     Buprenorphine  -     Levorphanol  -     Meperidine  -     Naltrexone  -     Fentanyl  -     Methadone  -     Oxycodone  -     Tapentadol  -     THC  -     Tramadol  -     Codeine, Hydrocodone, Hydropmorphone, Morphine  -     Bath Salts  -     Ethyl Glucuronide/Ethyl Sulfate  -     Kratom  -     Spice  -     Methylphenidate  -     Phentermine  -     Validity Oxidant  -     Validity Creatinine  -     Validity pH  -     Validity Specific    Colon cancer screening  -     Cologuard    Chronic midline low back pain with right-sided sciatica  -     traMADol (ULTRAM) 50 mg tablet; Take 1 tablet (50 mg total) by mouth every 12 (twelve) hours as needed for moderate pain    Type 2 diabetes mellitus with hyperglycemia, with long-term current use of insulin ([facility])  -     glucose blood (FREESTYLE LITE) test strip; Use 1 each 3 (three) times a day Use as instructed    Shortness of breath  -     albuterol (PROVENTIL HFA,VENTOLIN HFA) 90 mcg/act inhaler; Inhale 2 puffs every 6 (six) hours as needed for wheezing    Peripheral polyneuropathy  -     gabapentin (Neurontin) 300 mg capsule; Take 1 capsule (300 mg total) by mouth 3 (three) times a day Start with once a day, increase to twice a day and 7 days then to 3 times a day in 7 days. Continue 1 pill 3 times a day after that. Essential hypertension    Continuous opioid dependence (HCC)          Subjective:      Patient ID: Shauna Crews is a 74 y.o. male. Came today for 6-month follow-up on his multiple chronic problems. He agreed for Cologuard.           The following portions of the patient's history were reviewed and updated as appropriate: allergies, current medications, past family history, past medical history, past social history, past surgical history, and problem list.    Review of Systems Constitutional:  Negative for activity change, chills, fatigue and fever. HENT:  Negative for congestion, ear pain, sinus pressure and sore throat. Eyes:  Negative for pain and visual disturbance. Respiratory:  Negative for cough, chest tightness, shortness of breath and wheezing. Cardiovascular:  Negative for chest pain, palpitations and leg swelling. Gastrointestinal:  Negative for abdominal pain, blood in stool, constipation, diarrhea, nausea and vomiting. Endocrine: Negative for polydipsia and polyuria. Genitourinary:  Negative for difficulty urinating, dysuria, frequency and urgency. Musculoskeletal:  Positive for arthralgias and back pain. Negative for joint swelling and myalgias. Skin:  Negative for rash. Neurological:  Negative for dizziness, weakness, numbness and headaches. Hematological:  Negative for adenopathy. Does not bruise/bleed easily. Psychiatric/Behavioral:  Negative for dysphoric mood. The patient is not nervous/anxious. Objective:      /80 (BP Location: Left arm, Patient Position: Sitting, Cuff Size: Standard)   Pulse 85   Resp 12   Wt 78.2 kg (172 lb 6.4 oz)   SpO2 98%   BMI 27.00 kg/m²     Allergies   Allergen Reactions    Iodinated Contrast Media Other (See Comments)          Current Outpatient Medications:     albuterol (PROVENTIL HFA,VENTOLIN HFA) 90 mcg/act inhaler, Inhale 2 puffs every 6 (six) hours as needed for wheezing, Disp: 18 g, Rfl: 3    gabapentin (Neurontin) 300 mg capsule, Take 1 capsule (300 mg total) by mouth 3 (three) times a day Start with once a day, increase to twice a day and 7 days then to 3 times a day in 7 days.   Continue 1 pill 3 times a day after that., Disp: 180 capsule, Rfl: 1    glucose blood (FREESTYLE LITE) test strip, Use 1 each 3 (three) times a day Use as instructed, Disp: 300 strip, Rfl: 7    metFORMIN (GLUCOPHAGE-XR) 750 mg 24 hr tablet, Take 1 tablet (750 mg total) by mouth daily with breakfast, Disp: 90 tablet, Rfl: 0    traMADol (ULTRAM) 50 mg tablet, Take 1 tablet (50 mg total) by mouth every 12 (twelve) hours as needed for moderate pain, Disp: 60 tablet, Rfl: 0    amLODIPine (NORVASC) 10 mg tablet, TAKE ONE TABLET BY MOUTH ONCE DAILY, Disp: 30 tablet, Rfl: 0    ammonium lactate (LAC-HYDRIN) 12 % cream, APPLY TOPICALLY TO AFFECTED AREA ONCE DAILY AS NEEDED FOR DRY SKIN, Disp: 385 g, Rfl: 0    aspirin (ECOTRIN LOW STRENGTH) 81 mg EC tablet, Take 81 mg by mouth daily, Disp: , Rfl:     Aspirin Low Dose 81 MG EC tablet, TAKE ONE TABLET BY MOUTH DAILY, Disp: 30 tablet, Rfl: 11    atorvastatin (LIPITOR) 80 mg tablet, Take 1 tablet (80 mg total) by mouth daily with dinner, Disp: 30 tablet, Rfl: 11    Continuous Blood Gluc Sensor (FreeStyle Lucas 14 Day Sensor) MISC, , Disp: , Rfl:     Continuous Blood Gluc Sensor (FreeStyle Lucas 14 Day Sensor) MISC, Use 1 each every 14 (fourteen) days, Disp: , Rfl:     Dulaglutide 0.75 MG/0.5ML SOPN, Inject 0.75 mg under the skin once a week, Disp: , Rfl:     EASY COMFORT PEN NEEDLES 32G X 4 MM MISC, , Disp: , Rfl:     Empagliflozin 25 MG TABS, Take 1 tablet (25 mg total) by mouth in the morning, Disp: 90 tablet, Rfl: 2    ergocalciferol (VITAMIN D2) 50,000 units, Take 1 capsule weekly x8 weeks, then 2000 units p.o. Daily. , Disp: 8 capsule, Rfl: 1    ezetimibe (ZETIA) 10 mg tablet, Take 1 tablet (10 mg total) by mouth daily, Disp: 90 tablet, Rfl: 2    ibuprofen (MOTRIN) 800 mg tablet, , Disp: , Rfl:     insulin aspart protamine-insulin aspart (NovoLOG Mix 70/30 FlexPen) 100 Units/mL injection pen, Inject 40 units subcutaneously twice a day before meals, Disp: , Rfl:     Insulin Pen Needle 32G X 4 MM MISC, Use with flexpens BID, Disp: , Rfl:     losartan (COZAAR) 100 MG tablet, Take 1 tablet (100 mg total) by mouth daily, Disp: 90 tablet, Rfl: 2    Narcan 4 MG/0.1ML nasal spray, USE ONE SPRAY IN ONE NOSTRIL FOR ONE DOSE.  IF THE DESIRED RESPONSE IS NOT OBTAINED AFTER 2-3 MINUTES, ADMINISTER ADDITIONAL DOSE IN OPPOSITE NOSTRIL USING NEW SPRAY, Disp: 2 each, Rfl: 0    pantoprazole (PROTONIX) 40 mg tablet, Take 1 tablet (40 mg total) by mouth daily, Disp: 90 tablet, Rfl: 2    SURE COMFORT LANCETS 30G MISC, use as directed, Disp: , Rfl:     tadalafil (CIALIS) 10 MG tablet, Take 10 mg by mouth, Disp: , Rfl:     terazosin (HYTRIN) 10 MG capsule, Take 10 mg by mouth, Disp: , Rfl:     triamcinolone (KENALOG) 0.1 % cream, APPLY TOPICALLY TO AFFECTED AREA TWICE A DAY AS NEEDED FOR RASH, Disp: 30 g, Rfl: 0    UltiCare Alcohol Swabs 70 % PADS, USE TO CLEAN THE AREAS BEFORE TESTING BLOOD SUGAR OR/AND INJECTING INSULIN ONCE DAILY, Disp: 100 each, Rfl: 7     Patient Instructions   +++++++++++++++++++++++++++++++++++++++++++++++++++++++++++++++++++++++  Please collect Cologuard stool test for colon cancer screening. Very important. Please do your blood work at your convenience this week, drink full bottle water before going. Start gabapentin 1 pill at night then increase by 1 pill every 7 days and continue 3 times a day, watch for drowsiness. Start metformin 750 mg daily in the morning watch for diarrhea. Please follow-up with your endocrinologist.  If you want me to help with management of your diabetes you just let me know. Please bring your blood pressure monitor next time when you will come to see me. Before coming next time check your blood pressures at home few times a day for 5 to 7 days and bring it to me. Type 2 Diabetes Management for Adults   AMBULATORY CARE:   Type 2 diabetes  is a disease that affects how your body uses glucose (sugar). Either your body cannot make enough insulin, or it cannot use the insulin correctly. It is important to keep diabetes controlled to prevent damage to your heart, blood vessels, and other organs. Management will help you feel well and enjoy your daily activities.  Your diabetes care team providers can help you make a plan to fit diabetes care into your schedule. Your plan can change over time to fit your needs and your family's needs. Have someone call your local emergency number (911 in the [address]) if:   You cannot be woken. You have signs of diabetic ketoacidosis:     confusion, fatigue    vomiting    rapid heartbeat    fruity smelling breath    extreme thirst    dry mouth and skin    You have any of the following signs of a heart attack:      Squeezing, pressure, or pain in your chest    You may  also have any of the following:     Discomfort or pain in your back, neck, jaw, stomach, or arm    Shortness of breath    Nausea or vomiting    Lightheadedness or a sudden cold sweat    You have any of the following signs of a stroke:      Numbness or drooping on one side of your face     Weakness in an arm or leg    Confusion or difficulty speaking    Dizziness, a severe headache, or vision loss    Call your doctor or diabetes care team provider if:   You have a sore or wound that will not heal.    You have a change in the amount you urinate. Your blood sugar levels are higher than your target goals. You often have lower blood sugar levels than your target goals. Your skin is red, dry, warm, or swollen. You have trouble coping with diabetes, or you feel anxious or depressed. You have trouble following any part of your care plan, such as your meal plan. You have questions or concerns about your condition or care. What you need to know about high blood sugar levels:  High blood sugar levels may not cause any symptoms. You may feel more thirsty or urinate more often than usual. Over time, high blood sugar levels can damage your nerves, blood vessels, tissues, and organs.  The following can increase your blood sugar levels:  Large meals or large amounts of carbohydrates at one time    Less physical activity    Stress    Illness    A lower dose of diabetes medicine or insulin, or a late dose    What you need to know about low blood sugar levels:  Symptoms include feeling shaky, dizzy, irritable, or confused. You can prevent symptoms by keeping your blood sugar levels from going too low. Treat a low blood sugar level right away:      Drink 4 ounces of juice or have 1 tube of glucose gel. Check your blood sugar level again 10 to 15 minutes later. When the level goes back to normal, eat a meal or snack to prevent another decrease. Keep glucose gel, raisins, or hard candy with you at all times to treat a low blood sugar level. Your blood sugar level can get too low if you take diabetes medicine or insulin and do not eat enough food. If you use insulin, check your blood sugar level before you exercise. If your blood sugar level is below 100 mg/dL, eat 4 crackers or 2 ounces of raisins, or drink 4 ounces of juice. Check your level every 30 minutes if you exercise longer than 1 hour. You may need a snack during or after exercise. What you can do to manage your blood sugar levels:   Check your blood sugar levels as directed and as needed. Several items are available to use to check your levels. You may need to check by testing a drop of blood in a glucose monitor. You may instead be given a continuous glucose monitoring (CGM) device. The device is worn at all times. The CGM checks your blood sugar level every 5 minutes. It sends results to an electronic device such as a smart phone. A CGM can be used with or without an insulin pump. You and your diabetes care team providers will decide on the best method for you. The goal for blood sugar levels before meals  is between 80 and 130 mg/dL and 2 hours after eating  is lower than 180 mg/dL. Make healthy food choices. Work with a dietitian to create a meal plan that works for you and your schedule. A dietitian can help you learn how to eat the right amount of carbohydrates (sugar and starchy foods) during your meals and snacks.  Examples of carbohydrates are breads, cereals, rice, pasta, fruit, low-fat dairy, and sweets. Carbohydrates can raise your blood sugar level if you eat too many at one time. Eat high-fiber foods as directed. Fiber helps improve blood sugar levels. Fiber also lowers your risk for heart disease and other problems diabetes can cause. Examples of high-fiber foods include vegetables, whole-grain bread, and beans such as joiner beans. Your dietitian can tell you how much fiber to have each day. Get regular physical activity. Physical activity can help you get to your target blood sugar level goal and manage your weight. Get at least 150 minutes of moderate to vigorous aerobic physical activity each week. Resistance training, such as lifting weights, should be done 3 times each week. Do not miss more than 2 days of physical activity in a row. Do not sit longer than 30 minutes at a time. Your healthcare provider can help you create an activity plan. The plan can include the best activities for you and can help you build your strength and endurance. Maintain a healthy weight. Ask your team what a healthy weight is for you. A healthy weight can help you control diabetes and prevent heart disease. Ask your team to help you create a weight-loss plan, if needed. Even a loss of 3% to 7% of your excess body weight can help make a difference in managing diabetes. Your team will help you set a weight-loss goal, such as 10 to 15 pounds, or 5% of your extra weight. Together you and your team can set manageable weight-loss goals. Take your diabetes medicine or insulin as directed. You may need diabetes medicine, insulin, or both to help control your blood sugar levels. Your healthcare provider will teach you how and when to take your diabetes medicine or insulin. You will also be taught about side effects oral diabetes medicine can cause. Insulin may be injected or given through a pump or pen.  You and your providers will decide on the best method for you: An insulin pump  is an implanted device that gives your insulin 24 hours a day. An insulin pump prevents the need for multiple insulin injections in a day. An insulin pen  is a device prefilled with the right amount of insulin. You and your family members will be taught how to draw up and give insulin  if this is the best method for you. Your providers will also teach you how to dispose of needles and syringes. You will learn how much insulin you need  and when to give it. You will be taught when not to give insulin. You will also be taught what to do if your blood sugar level drops too low. This may happen if you take insulin and do not eat the right amount of carbohydrates. More ways to manage type 2 diabetes:   Wear medical alert identification. Wear medical alert jewelry or carry a card that says you have diabetes. Ask your provider where to get these items. Do not smoke. Nicotine and other chemicals in cigarettes and cigars can cause lung and blood vessel damage. It also makes it more difficult to manage your diabetes. Ask your provider for information if you currently smoke and need help to quit. Do not use e-cigarettes or smokeless tobacco in place of cigarettes or to help you quit. They still contain nicotine. Check your feet each day for cuts, scratches, calluses, or other wounds. Look for redness and swelling, and feel for warmth. Wear shoes that fit well. Check your shoes for rocks or other objects that can hurt your feet. Do not walk barefoot or wear shoes without socks. Wear cotton socks to help keep your feet dry. Ask about vaccines you may need. You have a higher risk for serious illness if you get the flu, pneumonia, COVID-19, or hepatitis. Ask your provider if you should get vaccines to prevent these or other diseases, and when to get the vaccines. Talk to your provider if you become stressed about diabetes care.   Sometimes being able to fit diabetes care into your life can cause increased stress. The stress can cause you not to take care of yourself properly. Your provider can help by offering tips about self-care. A mental health provider can listen and offer help with self-care issues. Other types of counseling can help you make nutrition or physical activity changes. Have your A1c checked as directed. Your provider may check your A1c every 3 months, or 2 times each year if your diabetes is controlled. An A1c test shows the average amount of sugar in your blood over the past 2 to 3 months. Your provider will tell you what your A1c level should be. Have screening tests as directed. Your provider may recommend screening for complications of diabetes and other conditions that may develop. Some screenings may begin right away and some may happen within the first 5 years of diagnosis:    Examples of diabetes complications  include kidney problems, high cholesterol, high blood pressure, blood vessel problems, eye problems, and sleep apnea. You may be screened for a low vitamin B level  if you take oral diabetes medicine for a long time. You may be screened for polycystic ovarian syndrome (PCOS)  if you are of childbearing age. Follow up with your doctor or diabetes care team providers as directed: You may need to have blood tests done before your follow-up visit. The test results will show if changes need to be made in your treatment or self-care. Talk to your provider if you cannot afford your medicine. Write down your questions so you remember to ask them during your visits. © Copyright [employer] 2023 Information is for End User's use only and may not be sold, redistributed or otherwise used for commercial purposes. The above information is an  only. It is not intended as medical advice for individual conditions or treatments.  Talk to your doctor, nurse or pharmacist before following any medical regimen to see if it is safe and effective for you. Physical Exam  Vitals reviewed. Constitutional:       General: He is not in acute distress. Appearance: He is not toxic-appearing. HENT:      Head: Normocephalic. Cardiovascular:      Rate and Rhythm: Normal rate. Pulses: Normal pulses. Heart sounds: No murmur heard. No gallop. Pulmonary:      Effort: Pulmonary effort is normal. No respiratory distress. Breath sounds: No wheezing or rales. Skin:     General: Skin is warm. Neurological:      General: No focal deficit present. Mental Status: He is alert.    Psychiatric:         Mood and Affect: Mood normal.         Behavior: Behavior normal.

## 2023-12-13 NOTE — PATIENT INSTRUCTIONS
+++++++++++++++++++++++++++++++++++++++++++++++++++++++++++++++++++++++  Please collect Cologuard stool test for colon cancer screening. Very important. Please do your blood work at your convenience this week, drink full bottle water before going. Start gabapentin 1 pill at night then increase by 1 pill every 7 days and continue 3 times a day, watch for drowsiness. Start metformin 750 mg daily in the morning watch for diarrhea. Please follow-up with your endocrinologist.  If you want me to help with management of your diabetes you just let me know. Please bring your blood pressure monitor next time when you will come to see me. Before coming next time check your blood pressures at home few times a day for 5 to 7 days and bring it to me. Type 2 Diabetes Management for Adults   AMBULATORY CARE:   Type 2 diabetes  is a disease that affects how your body uses glucose (sugar). Either your body cannot make enough insulin, or it cannot use the insulin correctly. It is important to keep diabetes controlled to prevent damage to your heart, blood vessels, and other organs. Management will help you feel well and enjoy your daily activities. Your diabetes care team providers can help you make a plan to fit diabetes care into your schedule. Your plan can change over time to fit your needs and your family's needs. Have someone call your local emergency number (911 in the [address]) if:   You cannot be woken.     You have signs of diabetic ketoacidosis:     confusion, fatigue    vomiting    rapid heartbeat    fruity smelling breath    extreme thirst    dry mouth and skin    You have any of the following signs of a heart attack:      Squeezing, pressure, or pain in your chest    You may  also have any of the following:     Discomfort or pain in your back, neck, jaw, stomach, or arm    Shortness of breath    Nausea or vomiting    Lightheadedness or a sudden cold sweat    You have any of the following signs of a stroke: Numbness or drooping on one side of your face     Weakness in an arm or leg    Confusion or difficulty speaking    Dizziness, a severe headache, or vision loss    Call your doctor or diabetes care team provider if:   You have a sore or wound that will not heal.    You have a change in the amount you urinate. Your blood sugar levels are higher than your target goals. You often have lower blood sugar levels than your target goals. Your skin is red, dry, warm, or swollen. You have trouble coping with diabetes, or you feel anxious or depressed. You have trouble following any part of your care plan, such as your meal plan. You have questions or concerns about your condition or care. What you need to know about high blood sugar levels:  High blood sugar levels may not cause any symptoms. You may feel more thirsty or urinate more often than usual. Over time, high blood sugar levels can damage your nerves, blood vessels, tissues, and organs. The following can increase your blood sugar levels:  Large meals or large amounts of carbohydrates at one time    Less physical activity    Stress    Illness    A lower dose of diabetes medicine or insulin, or a late dose    What you need to know about low blood sugar levels:  Symptoms include feeling shaky, dizzy, irritable, or confused. You can prevent symptoms by keeping your blood sugar levels from going too low. Treat a low blood sugar level right away:      Drink 4 ounces of juice or have 1 tube of glucose gel. Check your blood sugar level again 10 to 15 minutes later. When the level goes back to normal, eat a meal or snack to prevent another decrease. Keep glucose gel, raisins, or hard candy with you at all times to treat a low blood sugar level. Your blood sugar level can get too low if you take diabetes medicine or insulin and do not eat enough food. If you use insulin, check your blood sugar level before you exercise.       If your blood sugar level is below 100 mg/dL, eat 4 crackers or 2 ounces of raisins, or drink 4 ounces of juice. Check your level every 30 minutes if you exercise longer than 1 hour. You may need a snack during or after exercise. What you can do to manage your blood sugar levels:   Check your blood sugar levels as directed and as needed. Several items are available to use to check your levels. You may need to check by testing a drop of blood in a glucose monitor. You may instead be given a continuous glucose monitoring (CGM) device. The device is worn at all times. The CGM checks your blood sugar level every 5 minutes. It sends results to an electronic device such as a smart phone. A CGM can be used with or without an insulin pump. You and your diabetes care team providers will decide on the best method for you. The goal for blood sugar levels before meals  is between 80 and 130 mg/dL and 2 hours after eating  is lower than 180 mg/dL. Make healthy food choices. Work with a dietitian to create a meal plan that works for you and your schedule. A dietitian can help you learn how to eat the right amount of carbohydrates (sugar and starchy foods) during your meals and snacks. Examples of carbohydrates are breads, cereals, rice, pasta, fruit, low-fat dairy, and sweets. Carbohydrates can raise your blood sugar level if you eat too many at one time. Eat high-fiber foods as directed. Fiber helps improve blood sugar levels. Fiber also lowers your risk for heart disease and other problems diabetes can cause. Examples of high-fiber foods include vegetables, whole-grain bread, and beans such as joiner beans. Your dietitian can tell you how much fiber to have each day. Get regular physical activity. Physical activity can help you get to your target blood sugar level goal and manage your weight. Get at least 150 minutes of moderate to vigorous aerobic physical activity each week.  Resistance training, such as lifting weights, should be done 3 times each week. Do not miss more than 2 days of physical activity in a row. Do not sit longer than 30 minutes at a time. Your healthcare provider can help you create an activity plan. The plan can include the best activities for you and can help you build your strength and endurance. Maintain a healthy weight. Ask your team what a healthy weight is for you. A healthy weight can help you control diabetes and prevent heart disease. Ask your team to help you create a weight-loss plan, if needed. Even a loss of 3% to 7% of your excess body weight can help make a difference in managing diabetes. Your team will help you set a weight-loss goal, such as 10 to 15 pounds, or 5% of your extra weight. Together you and your team can set manageable weight-loss goals. Take your diabetes medicine or insulin as directed. You may need diabetes medicine, insulin, or both to help control your blood sugar levels. Your healthcare provider will teach you how and when to take your diabetes medicine or insulin. You will also be taught about side effects oral diabetes medicine can cause. Insulin may be injected or given through a pump or pen. You and your providers will decide on the best method for you: An insulin pump  is an implanted device that gives your insulin 24 hours a day. An insulin pump prevents the need for multiple insulin injections in a day. An insulin pen  is a device prefilled with the right amount of insulin. You and your family members will be taught how to draw up and give insulin  if this is the best method for you. Your providers will also teach you how to dispose of needles and syringes. You will learn how much insulin you need  and when to give it. You will be taught when not to give insulin. You will also be taught what to do if your blood sugar level drops too low.  This may happen if you take insulin and do not eat the right amount of carbohydrates. More ways to manage type 2 diabetes:   Wear medical alert identification. Wear medical alert jewelry or carry a card that says you have diabetes. Ask your provider where to get these items. Do not smoke. Nicotine and other chemicals in cigarettes and cigars can cause lung and blood vessel damage. It also makes it more difficult to manage your diabetes. Ask your provider for information if you currently smoke and need help to quit. Do not use e-cigarettes or smokeless tobacco in place of cigarettes or to help you quit. They still contain nicotine. Check your feet each day for cuts, scratches, calluses, or other wounds. Look for redness and swelling, and feel for warmth. Wear shoes that fit well. Check your shoes for rocks or other objects that can hurt your feet. Do not walk barefoot or wear shoes without socks. Wear cotton socks to help keep your feet dry. Ask about vaccines you may need. You have a higher risk for serious illness if you get the flu, pneumonia, COVID-19, or hepatitis. Ask your provider if you should get vaccines to prevent these or other diseases, and when to get the vaccines. Talk to your provider if you become stressed about diabetes care. Sometimes being able to fit diabetes care into your life can cause increased stress. The stress can cause you not to take care of yourself properly. Your provider can help by offering tips about self-care. A mental health provider can listen and offer help with self-care issues. Other types of counseling can help you make nutrition or physical activity changes. Have your A1c checked as directed. Your provider may check your A1c every 3 months, or 2 times each year if your diabetes is controlled. An A1c test shows the average amount of sugar in your blood over the past 2 to 3 months. Your provider will tell you what your A1c level should be. Have screening tests as directed.   Your provider may recommend screening for complications of diabetes and other conditions that may develop. Some screenings may begin right away and some may happen within the first 5 years of diagnosis:    Examples of diabetes complications  include kidney problems, high cholesterol, high blood pressure, blood vessel problems, eye problems, and sleep apnea. You may be screened for a low vitamin B level  if you take oral diabetes medicine for a long time. You may be screened for polycystic ovarian syndrome (PCOS)  if you are of childbearing age. Follow up with your doctor or diabetes care team providers as directed: You may need to have blood tests done before your follow-up visit. The test results will show if changes need to be made in your treatment or self-care. Talk to your provider if you cannot afford your medicine. Write down your questions so you remember to ask them during your visits. © Copyright Jorge Edwards 2023 Information is for End User's use only and may not be sold, redistributed or otherwise used for commercial purposes. The above information is an  only. It is not intended as medical advice for individual conditions or treatments. Talk to your doctor, nurse or pharmacist before following any medical regimen to see if it is safe and effective for you.

## 2023-12-13 NOTE — LETTER
Diabetic Eye Exam Form    Date Requested: 23  Patient: Wagner Ochoa  Patient : [id number]   Referring Provider: Ricardo Presley MD   2nd Request      DIABETIC Eye Exam Date _______________________________      Type of Exam MUST be documented for Diabetic Eye Exams. Please CHECK ONE.     Retinal Exam       Dilated Retinal Exam       OCT       Optomap-Iris Exam      Fundus Photography       Left Eye - Please check Retinopathy or No Retinopathy        Exam did show retinopathy    Exam did not show retinopathy       Right Eye - Please check Retinopathy or No Retinopathy       Exam did show retinopathy    Exam did not show retinopathy       Comments __________________________________________________________    Practice Providing Exam ______________________________________________    Exam Performed By (print name) _______________________________________      Provider Signature ___________________________________________________      These reports are needed for  compliance.  Please fax this completed form and a copy of the Diabetic Eye Exam report to our office located at [address] [place] [address] as soon as possible via Fax [phone] attention Jone: Phone [phone]  We thank you for your assistance in treating our mutual patient.

## 2023-12-13 NOTE — TELEPHONE ENCOUNTER
----- Message from Marija Denny sent at 12/13/2023 11:30 AM EST -----  Regarding: DM eye exam  12/13/23 11:30 AM    Hello, our patient Wagner Ochoa has had Diabetic Eye Exam completed/performed. Please assist in updating the patient chart by making an External outreach to [facility] facility located in [address] [place] [address] [place], [place] [address]     Thank you,  Marija Denny  [facility] PRIMARY [facility]

## 2023-12-15 LAB
6MAM UR QL CFM: NEGATIVE NG/ML
7AMINOCLONAZEPAM UR QL CFM: NEGATIVE NG/ML
A-OH ALPRAZ UR QL CFM: NEGATIVE NG/ML
ACCEPTABLE CREAT UR QL: NORMAL MG/DL
ACCEPTIBLE SP GR UR QL: NORMAL
AMPHET UR QL CFM: NEGATIVE NG/ML
BUPRENORPHINE UR QL CFM: NEGATIVE NG/ML
BUTALBITAL UR QL CFM: NEGATIVE NG/ML
BZE UR QL CFM: NEGATIVE NG/ML
CODEINE UR QL CFM: NEGATIVE NG/ML
EDDP UR QL CFM: NEGATIVE NG/ML
ETHYL GLUCURONIDE UR QL CFM: NEGATIVE NG/ML
ETHYL SULFATE UR QL SCN: NEGATIVE NG/ML
EUTYLONE UR QL: NEGATIVE NG/ML
FENTANYL UR QL CFM: NEGATIVE NG/ML
GLIADIN IGG SER IA-ACNC: NEGATIVE NG/ML
HYDROCODONE UR QL CFM: NEGATIVE NG/ML
HYDROMORPHONE UR QL CFM: NEGATIVE NG/ML
LORAZEPAM UR QL CFM: NEGATIVE NG/ML
ME-PHENIDATE UR QL CFM: NEGATIVE NG/ML
MEPERIDINE UR QL CFM: NEGATIVE NG/ML
METHADONE UR QL CFM: NEGATIVE NG/ML
METHAMPHET UR QL CFM: NEGATIVE NG/ML
MORPHINE UR QL CFM: NEGATIVE NG/ML
NALTREXONE UR QL CFM: NEGATIVE NG/ML
NITRITE UR QL: NORMAL UG/ML
NORBUPRENORPHINE UR QL CFM: NEGATIVE NG/ML
NORDIAZEPAM UR QL CFM: NEGATIVE NG/ML
NORFENTANYL UR QL CFM: NEGATIVE NG/ML
NORHYDROCODONE UR QL CFM: NEGATIVE NG/ML
NORMEPERIDINE UR QL CFM: NEGATIVE NG/ML
NOROXYCODONE UR QL CFM: NEGATIVE NG/ML
OXAZEPAM UR QL CFM: NEGATIVE NG/ML
OXYCODONE UR QL CFM: NEGATIVE NG/ML
OXYMORPHONE UR QL CFM: NEGATIVE NG/ML
PARA-FLUOROFENTANYL QUANTIFICATION: NORMAL NG/ML
PHENOBARB UR QL CFM: NEGATIVE NG/ML
RESULT ALL_PRESCRIBED MEDS AND SPECIAL INSTRUCTIONS: NORMAL
SECOBARBITAL UR QL CFM: NEGATIVE NG/ML
SL AMB 4-ANPP QUANTIFICATION: NORMAL NG/ML
SL AMB 5F-ADB-M7 METABOLITE QUANTIFICATION: NEGATIVE NG/ML
SL AMB 7-OH-MITRAGYNINE (KRATOM ALKALOID) QUANTIFICATION: NEGATIVE NG/ML
SL AMB AB-FUBINACA-M3 METABOLITE QUANTIFICATION: NEGATIVE NG/ML
SL AMB ACETYL FENTANYL QUANTIFICATION: NORMAL NG/ML
SL AMB ACETYL NORFENTANYL QUANTIFICATION: NORMAL NG/ML
SL AMB ACRYL FENTANYL QUANTIFICATION: NORMAL NG/ML
SL AMB CARFENTANIL QUANTIFICATION: NORMAL NG/ML
SL AMB CTHC (MARIJUANA METABOLITE) QUANTIFICATION: NEGATIVE NG/ML
SL AMB DEXTRORPHAN (DEXTROMETHORPHAN METABOLITE) QUANT: NEGATIVE NG/ML
SL AMB GABAPENTIN QUANTIFICATION: ABNORMAL
SL AMB JWH018 METABOLITE QUANTIFICATION: NEGATIVE NG/ML
SL AMB JWH073 METABOLITE QUANTIFICATION: NEGATIVE NG/ML
SL AMB MDMB-FUBINACA-M1 METABOLITE QUANTIFICATION: NEGATIVE NG/ML
SL AMB METHYLONE QUANTIFICATION: NEGATIVE NG/ML
SL AMB N-DESMETHYL-TRAMADOL QUANTIFICATION: NORMAL NG/ML
SL AMB PHENTERMINE QUANTIFICATION: NEGATIVE NG/ML
SL AMB PREGABALIN QUANTIFICATION: NEGATIVE
SL AMB RCS4 METABOLITE QUANTIFICATION: NEGATIVE NG/ML
SL AMB RITALINIC ACID QUANTIFICATION: NEGATIVE NG/ML
SMOOTH MUSCLE AB TITR SER IF: NEGATIVE NG/ML
SPECIMEN DRAWN SERPL: NEGATIVE NG/ML
SPECIMEN PH ACCEPTABLE UR: NORMAL
TAPENTADOL UR QL CFM: NEGATIVE NG/ML
TEMAZEPAM UR QL CFM: NEGATIVE NG/ML
TRAMADOL UR QL CFM: NORMAL NG/ML
URATE/CREAT 24H UR: NORMAL NG/ML

## 2023-12-20 NOTE — TELEPHONE ENCOUNTER
As a follow-up, a second attempt has been made for outreach via fax to facility. Please see Contacts section for details.    Thank you  Jone Gomez MA

## 2023-12-22 LAB — COLOGUARD RESULT REPORTABLE: NORMAL

## 2023-12-27 NOTE — TELEPHONE ENCOUNTER
As a final attempt, a third outreach has been made via telephone call to facility. Please see Contacts section for details. This encounter will be closed and completed by end of day. Should we receive the requested information because of previous outreach attempts, the requested patient's chart will be updated appropriately.     Thank you  Jone Gomez MA

## 2024-02-09 DIAGNOSIS — M54.41 CHRONIC MIDLINE LOW BACK PAIN WITH RIGHT-SIDED SCIATICA: ICD-10-CM

## 2024-02-09 DIAGNOSIS — G89.29 CHRONIC MIDLINE LOW BACK PAIN WITH RIGHT-SIDED SCIATICA: ICD-10-CM

## 2024-02-09 NOTE — TELEPHONE ENCOUNTER
2/9 11:34am: PT walked in to request refill of Tramadol (Ultram) 50mg tablets. Last AWV was 6/2023. Next AWV 6/17/2024.

## 2024-02-12 DIAGNOSIS — E78.2 MIXED HYPERLIPIDEMIA: ICD-10-CM

## 2024-02-12 RX ORDER — ATORVASTATIN CALCIUM 80 MG/1
80 TABLET, FILM COATED ORAL
Qty: 90 TABLET | Refills: 1 | Status: SHIPPED | OUTPATIENT
Start: 2024-02-12

## 2024-02-12 RX ORDER — TRAMADOL HYDROCHLORIDE 50 MG/1
50 TABLET ORAL EVERY 12 HOURS PRN
Qty: 60 TABLET | Refills: 0 | Status: SHIPPED | OUTPATIENT
Start: 2024-02-12

## 2024-02-12 RX ORDER — EZETIMIBE 10 MG/1
10 TABLET ORAL DAILY
Qty: 90 TABLET | Refills: 1 | Status: SHIPPED | OUTPATIENT
Start: 2024-02-12

## 2024-02-19 DIAGNOSIS — Z79.4 TYPE 2 DIABETES MELLITUS WITH STAGE 3 CHRONIC KIDNEY DISEASE, WITH LONG-TERM CURRENT USE OF INSULIN, UNSPECIFIED WHETHER STAGE 3A OR 3B CKD (HCC): ICD-10-CM

## 2024-02-19 DIAGNOSIS — R06.02 SHORTNESS OF BREATH: ICD-10-CM

## 2024-02-19 DIAGNOSIS — N18.30 TYPE 2 DIABETES MELLITUS WITH STAGE 3 CHRONIC KIDNEY DISEASE, WITH LONG-TERM CURRENT USE OF INSULIN, UNSPECIFIED WHETHER STAGE 3A OR 3B CKD (HCC): ICD-10-CM

## 2024-02-19 DIAGNOSIS — E11.22 TYPE 2 DIABETES MELLITUS WITH STAGE 3 CHRONIC KIDNEY DISEASE, WITH LONG-TERM CURRENT USE OF INSULIN, UNSPECIFIED WHETHER STAGE 3A OR 3B CKD (HCC): ICD-10-CM

## 2024-02-19 RX ORDER — METFORMIN HYDROCHLORIDE 750 MG/1
TABLET, EXTENDED RELEASE ORAL
Qty: 90 TABLET | Refills: 1 | Status: SHIPPED | OUTPATIENT
Start: 2024-02-19

## 2024-02-19 RX ORDER — ALBUTEROL SULFATE 90 UG/1
AEROSOL, METERED RESPIRATORY (INHALATION)
Qty: 6.7 G | Refills: 5 | Status: SHIPPED | OUTPATIENT
Start: 2024-02-19

## 2024-03-04 DIAGNOSIS — G62.9 PERIPHERAL POLYNEUROPATHY: ICD-10-CM

## 2024-03-04 RX ORDER — GABAPENTIN 300 MG/1
CAPSULE ORAL
Qty: 180 CAPSULE | Refills: 1 | Status: SHIPPED | OUTPATIENT
Start: 2024-03-04

## 2024-04-17 DIAGNOSIS — G89.29 CHRONIC MIDLINE LOW BACK PAIN WITH RIGHT-SIDED SCIATICA: ICD-10-CM

## 2024-04-17 DIAGNOSIS — M54.41 CHRONIC MIDLINE LOW BACK PAIN WITH RIGHT-SIDED SCIATICA: ICD-10-CM

## 2024-04-17 RX ORDER — TRAMADOL HYDROCHLORIDE 50 MG/1
50 TABLET ORAL EVERY 12 HOURS PRN
Qty: 60 TABLET | Refills: 0 | Status: SHIPPED | OUTPATIENT
Start: 2024-04-17

## 2024-05-02 ENCOUNTER — HOSPITAL ENCOUNTER (EMERGENCY)
Facility: HOSPITAL | Age: 74
Discharge: HOME | End: 2024-05-02
Attending: EMERGENCY MEDICINE | Admitting: EMERGENCY MEDICINE
Payer: MEDICARE

## 2024-05-02 VITALS
SYSTOLIC BLOOD PRESSURE: 219 MMHG | TEMPERATURE: 97.8 F | DIASTOLIC BLOOD PRESSURE: 119 MMHG | RESPIRATION RATE: 18 BRPM | HEART RATE: 73 BPM | WEIGHT: 173 LBS | BODY MASS INDEX: 27.1 KG/M2 | OXYGEN SATURATION: 97 %

## 2024-05-02 DIAGNOSIS — H61.21 IMPACTED CERUMEN OF RIGHT EAR: Primary | ICD-10-CM

## 2024-05-02 PROCEDURE — 99283 EMERGENCY DEPT VISIT LOW MDM: CPT | Performed by: EMERGENCY MEDICINE

## 2024-05-02 PROCEDURE — 99282 EMERGENCY DEPT VISIT SF MDM: CPT

## 2024-05-02 NOTE — ED PROVIDER NOTES
History  Chief Complaint   Patient presents with    Foreign Body in Ear     Rt ear     73-year-old male presenting to the emerged department with right ear hearing loss.  Feels like there may be a piece of hearing aid left behind.        Prior to Admission Medications   Prescriptions Last Dose Informant Patient Reported? Taking?   Aspirin Low Dose 81 MG EC tablet  Self No No   Sig: TAKE ONE TABLET BY MOUTH DAILY   Continuous Blood Gluc Sensor (FreeStyle Lucas 14 Day Sensor) MISC  Self Yes No   Continuous Blood Gluc Sensor (FreeStyle Lucas 14 Day Sensor) MISC  Self Yes No   Sig: Use 1 each every 14 (fourteen) days   Dulaglutide 0.75 MG/0.5ML SOPN  Self Yes No   Sig: Inject 0.75 mg under the skin once a week   EASY COMFORT PEN NEEDLES 32G X 4 MM MISC  Self Yes No   Empagliflozin 25 MG TABS   No No   Sig: Take 1 tablet (25 mg total) by mouth in the morning   Insulin Pen Needle 32G X 4 MM MISC  Self Yes No   Sig: Use with flexpens BID   Narcan 4 MG/0.1ML nasal spray  Self No No   Sig: USE ONE SPRAY IN ONE NOSTRIL FOR ONE DOSE. IF THE DESIRED RESPONSE IS NOT OBTAINED AFTER 2-3 MINUTES, ADMINISTER ADDITIONAL DOSE IN OPPOSITE NOSTRIL USING NEW SPRAY   SURE COMFORT LANCETS 30G MISC  Self Yes No   Sig: use as directed   UltiCare Alcohol Swabs 70 % PADS   No No   Sig: USE TO CLEAN THE AREAS BEFORE TESTING BLOOD SUGAR OR/AND INJECTING INSULIN ONCE DAILY   albuterol (PROVENTIL HFA,VENTOLIN HFA) 90 mcg/act inhaler   No No   Sig: INHALE 2 PUFFS EVERY 6 HOURS AS NEEDED FOR WHEEZING   amLODIPine (NORVASC) 10 mg tablet   No No   Sig: TAKE ONE TABLET BY MOUTH ONCE DAILY   ammonium lactate (LAC-HYDRIN) 12 % cream  Self No No   Sig: APPLY TOPICALLY TO AFFECTED AREA ONCE DAILY AS NEEDED FOR DRY SKIN   aspirin (ECOTRIN LOW STRENGTH) 81 mg EC tablet  Self Yes No   Sig: Take 81 mg by mouth daily   atorvastatin (LIPITOR) 80 mg tablet   No No   Sig: TAKE ONE TABLET BY MOUTH ONCE DAILY WITH DINNER   ergocalciferol (VITAMIN D2) 50,000 units   Self No No   Sig: Take 1 capsule weekly x8 weeks, then 2000 units p.o. Daily.   ezetimibe (ZETIA) 10 mg tablet   No No   Sig: TAKE ONE TABLET BY MOUTH ONCE DAILY   gabapentin (NEURONTIN) 300 mg capsule   No No   Sig: TAKE ONE CAPSULE BY MOUTH THREE TIMES A DAY STAR WITH ONCE DAILY INCREASE TO TWICE A DAY AND 7 DAYS THEN TO THREE TIMES A DAY IN 7 DAYS CONTINUE THREE TIMES A DAY AFTER THAT   glucose blood (FREESTYLE LITE) test strip   No No   Sig: Use 1 each 3 (three) times a day Use as instructed   ibuprofen (MOTRIN) 800 mg tablet  Self Yes No   insulin aspart protamine-insulin aspart (NovoLOG Mix 70/30 FlexPen) 100 Units/mL injection pen  Self Yes No   Sig: Inject 40 units subcutaneously twice a day before meals   losartan (COZAAR) 100 MG tablet   No No   Sig: Take 1 tablet (100 mg total) by mouth daily   metFORMIN (GLUCOPHAGE-XR) 750 mg 24 hr tablet   No No   Sig: TAKE ONE TABLET BY MOUTH WITH BREAKFAST ONCE DAILY   pantoprazole (PROTONIX) 40 mg tablet   No No   Sig: Take 1 tablet (40 mg total) by mouth daily   tadalafil (CIALIS) 10 MG tablet  Self Yes No   Sig: Take 10 mg by mouth   terazosin (HYTRIN) 10 MG capsule  Self Yes No   Sig: Take 10 mg by mouth   traMADol (ULTRAM) 50 mg tablet   No No   Sig: Take 1 tablet (50 mg total) by mouth every 12 (twelve) hours as needed for moderate pain   triamcinolone (KENALOG) 0.1 % cream  Self No No   Sig: APPLY TOPICALLY TO AFFECTED AREA TWICE A DAY AS NEEDED FOR RASH      Facility-Administered Medications: None       Past Medical History:   Diagnosis Date    Diabetes mellitus (HCC)        Past Surgical History:   Procedure Laterality Date    APPENDECTOMY Right 1970    PARAMEDIAN INCISION       History reviewed. No pertinent family history.  I have reviewed and agree with the history as documented.    E-Cigarette/Vaping     E-Cigarette/Vaping Substances     Social History     Tobacco Use    Smoking status: Former     Types: Cigarettes    Smokeless tobacco: Former    Tobacco  comments:     Quit 2001   Substance Use Topics    Alcohol use: Never    Drug use: Never       Review of Systems   Constitutional:  Negative for chills and fever.   HENT:  Negative for ear pain and sore throat.    Eyes:  Negative for pain and visual disturbance.   Respiratory:  Negative for cough and shortness of breath.    Cardiovascular:  Negative for chest pain and palpitations.   Gastrointestinal:  Negative for abdominal pain and vomiting.   Genitourinary:  Negative for dysuria and hematuria.   Musculoskeletal:  Negative for arthralgias and back pain.   Skin:  Negative for color change and rash.   Neurological:  Negative for seizures and syncope.   All other systems reviewed and are negative.      Physical Exam  Physical Exam  Vitals and nursing note reviewed.   Constitutional:       General: He is not in acute distress.     Appearance: He is well-developed.   HENT:      Head: Normocephalic and atraumatic.      Ears:      Comments: Right TM not visible due to hard cerumen impaction  Eyes:      Conjunctiva/sclera: Conjunctivae normal.   Cardiovascular:      Rate and Rhythm: Normal rate and regular rhythm.      Heart sounds: No murmur heard.  Pulmonary:      Effort: Pulmonary effort is normal. No respiratory distress.      Breath sounds: Normal breath sounds.   Abdominal:      Palpations: Abdomen is soft.      Tenderness: There is no abdominal tenderness.   Musculoskeletal:         General: No swelling.      Cervical back: Neck supple.   Skin:     General: Skin is warm and dry.      Capillary Refill: Capillary refill takes less than 2 seconds.   Neurological:      Mental Status: He is alert.   Psychiatric:         Mood and Affect: Mood normal.         Vital Signs  ED Triage Vitals [05/02/24 0741]   Temperature Pulse Respirations Blood Pressure SpO2   97.8 °F (36.6 °C) 73 18 (!) 219/119 97 %      Temp Source Heart Rate Source Patient Position - Orthostatic VS BP Location FiO2 (%)   Oral Monitor Sitting Right arm --       Pain Score       --           Vitals:    05/02/24 0741   BP: (!) 219/119   Pulse: 73   Patient Position - Orthostatic VS: Sitting         Visual Acuity      ED Medications  Medications - No data to display    Diagnostic Studies  Results Reviewed       None                   No orders to display              Procedures  Procedures         ED Course                                             Medical Decision Making  73-year-old male with right ear hearing loss secondary to cerumen impaction.  Will prescribe Debrox.  PCP follow-up.    Risk  OTC drugs.             Disposition  Final diagnoses:   Impacted cerumen of right ear     Time reflects when diagnosis was documented in both MDM as applicable and the Disposition within this note       Time User Action Codes Description Comment    5/2/2024  7:55 AM Gertrudis Odom Add [H61.21] Impacted cerumen of right ear           ED Disposition       ED Disposition   Discharge    Condition   Stable    Date/Time   Thu May 2, 2024  7:54 AM    Comment   Wagner Ochoa discharge to home/self care.                   Follow-up Information       Follow up With Specialties Details Why Contact Info    Ricardo Presley MD Internal Medicine   [address] [place] [address]  [address]  [facility] [address] [phone]              Patient's Medications   Discharge Prescriptions    CARBAMIDE PEROXIDE (DEBROX) 6.5 % OTIC SOLUTION    Administer 5 drops into ears 2 (two) times a day for 4 days       Start Date: 5/2/2024  End Date: 5/6/2024       Order Dose: 5 drops       Quantity: 15 mL    Refills: 0       No discharge procedures on file.    PDMP Review         Value Time User    PDMP Reviewed  Yes 4/20/2022  8:30 AM Fatoumata Mcelroy MD            ED Provider  Electronically Signed by             Gertrudis Odom MD  05/02/24 0756

## 2024-05-03 ENCOUNTER — VBI (OUTPATIENT)
Dept: FAMILY MEDICINE CLINIC | Facility: CLINIC | Age: 74
End: 2024-05-03

## 2024-05-03 NOTE — TELEPHONE ENCOUNTER
05/03/24 10:12 AM    Patient contacted post ED visit, [facility] department spoke with patient/caregiver and outreach was successful.    Thank you.  Sagar Parkinson MA  [facility]

## 2024-05-11 DIAGNOSIS — I10 ESSENTIAL HYPERTENSION: ICD-10-CM

## 2024-05-13 RX ORDER — AMLODIPINE BESYLATE 10 MG/1
10 TABLET ORAL DAILY
Qty: 90 TABLET | Refills: 1 | Status: SHIPPED | OUTPATIENT
Start: 2024-05-13

## 2024-06-01 DIAGNOSIS — G62.9 PERIPHERAL POLYNEUROPATHY: ICD-10-CM

## 2024-06-03 RX ORDER — GABAPENTIN 300 MG/1
CAPSULE ORAL
Qty: 180 CAPSULE | Refills: 2 | Status: SHIPPED | OUTPATIENT
Start: 2024-06-03

## 2024-06-08 DIAGNOSIS — Z79.4 TYPE 2 DIABETES MELLITUS WITH HYPERGLYCEMIA, WITH LONG-TERM CURRENT USE OF INSULIN (HCC): ICD-10-CM

## 2024-06-08 DIAGNOSIS — E11.65 TYPE 2 DIABETES MELLITUS WITH HYPERGLYCEMIA, WITH LONG-TERM CURRENT USE OF INSULIN (HCC): ICD-10-CM

## 2024-06-10 RX ORDER — ALCOHOL ANTISEPTIC PADS
PADS, MEDICATED (EA) TOPICAL
Refills: 6 | OUTPATIENT
Start: 2024-06-10

## 2024-06-17 ENCOUNTER — OFFICE VISIT (OUTPATIENT)
Dept: FAMILY MEDICINE CLINIC | Facility: CLINIC | Age: 74
End: 2024-06-17
Payer: MEDICARE

## 2024-06-17 ENCOUNTER — TELEPHONE (OUTPATIENT)
Dept: FAMILY MEDICINE CLINIC | Facility: CLINIC | Age: 74
End: 2024-06-17

## 2024-06-17 VITALS
BODY MASS INDEX: 26.15 KG/M2 | HEART RATE: 67 BPM | WEIGHT: 166.6 LBS | RESPIRATION RATE: 20 BRPM | DIASTOLIC BLOOD PRESSURE: 64 MMHG | TEMPERATURE: 97 F | HEIGHT: 67 IN | OXYGEN SATURATION: 100 % | SYSTOLIC BLOOD PRESSURE: 132 MMHG

## 2024-06-17 DIAGNOSIS — M79.671 PAIN IN BOTH FEET: ICD-10-CM

## 2024-06-17 DIAGNOSIS — Z79.4 TYPE 2 DIABETES MELLITUS WITH STAGE 3 CHRONIC KIDNEY DISEASE, WITH LONG-TERM CURRENT USE OF INSULIN, UNSPECIFIED WHETHER STAGE 3A OR 3B CKD (HCC): ICD-10-CM

## 2024-06-17 DIAGNOSIS — N18.30 TYPE 2 DIABETES MELLITUS WITH STAGE 3 CHRONIC KIDNEY DISEASE, WITH LONG-TERM CURRENT USE OF INSULIN, UNSPECIFIED WHETHER STAGE 3A OR 3B CKD (HCC): ICD-10-CM

## 2024-06-17 DIAGNOSIS — Z12.5 SCREENING FOR PROSTATE CANCER: ICD-10-CM

## 2024-06-17 DIAGNOSIS — E11.65 TYPE 2 DIABETES MELLITUS WITH HYPERGLYCEMIA, WITH LONG-TERM CURRENT USE OF INSULIN (HCC): ICD-10-CM

## 2024-06-17 DIAGNOSIS — Z13.89 SCREENING FOR BLOOD OR PROTEIN IN URINE: ICD-10-CM

## 2024-06-17 DIAGNOSIS — G89.29 CHRONIC MIDLINE LOW BACK PAIN WITH RIGHT-SIDED SCIATICA: ICD-10-CM

## 2024-06-17 DIAGNOSIS — E78.2 MIXED HYPERLIPIDEMIA: ICD-10-CM

## 2024-06-17 DIAGNOSIS — M79.672 PAIN IN BOTH FEET: ICD-10-CM

## 2024-06-17 DIAGNOSIS — Z79.4 TYPE 2 DIABETES MELLITUS WITH HYPERGLYCEMIA, WITH LONG-TERM CURRENT USE OF INSULIN (HCC): ICD-10-CM

## 2024-06-17 DIAGNOSIS — I10 ESSENTIAL HYPERTENSION: ICD-10-CM

## 2024-06-17 DIAGNOSIS — Z00.00 MEDICARE ANNUAL WELLNESS VISIT, SUBSEQUENT: Primary | ICD-10-CM

## 2024-06-17 DIAGNOSIS — M54.41 CHRONIC MIDLINE LOW BACK PAIN WITH RIGHT-SIDED SCIATICA: ICD-10-CM

## 2024-06-17 DIAGNOSIS — Z13.0 SCREENING FOR DEFICIENCY ANEMIA: ICD-10-CM

## 2024-06-17 DIAGNOSIS — Z13.29 SCREENING FOR HYPOTHYROIDISM: ICD-10-CM

## 2024-06-17 DIAGNOSIS — Z12.11 COLON CANCER SCREENING: ICD-10-CM

## 2024-06-17 DIAGNOSIS — F11.20 CONTINUOUS OPIOID DEPENDENCE (HCC): ICD-10-CM

## 2024-06-17 DIAGNOSIS — E11.22 TYPE 2 DIABETES MELLITUS WITH STAGE 3 CHRONIC KIDNEY DISEASE, WITH LONG-TERM CURRENT USE OF INSULIN, UNSPECIFIED WHETHER STAGE 3A OR 3B CKD (HCC): ICD-10-CM

## 2024-06-17 LAB — SL AMB POCT HEMOGLOBIN AIC: 14.7 (ref ?–6.5)

## 2024-06-17 PROCEDURE — 83036 HEMOGLOBIN GLYCOSYLATED A1C: CPT | Performed by: INTERNAL MEDICINE

## 2024-06-17 PROCEDURE — 99214 OFFICE O/P EST MOD 30 MIN: CPT | Performed by: INTERNAL MEDICINE

## 2024-06-17 PROCEDURE — G0439 PPPS, SUBSEQ VISIT: HCPCS | Performed by: INTERNAL MEDICINE

## 2024-06-17 NOTE — ASSESSMENT & PLAN NOTE
Blood pressures are very well-controlled on current dose of amlodipine and losartan.  Will recheck his kidney function and electrolytes.

## 2024-06-17 NOTE — ASSESSMENT & PLAN NOTE
Controlled on tramadol, he uses it very sparingly.  Denies any side effects.  Will run urine drug screen.  Last dose of tramadol was 48 hours ago.

## 2024-06-17 NOTE — ASSESSMENT & PLAN NOTE
Likely due to peripheral polyneuropathy due to uncontrolled diabetes.  He complains of bilateral foot pain that limits his ambulation.  Will refer to podiatry.

## 2024-06-17 NOTE — PATIENT INSTRUCTIONS
Medicare Preventive Visit Patient Instructions  Thank you for completing your Welcome to Medicare Visit or Medicare Annual Wellness Visit today. Your next wellness visit will be due in one year (6/18/2025).  The screening/preventive services that you may require over the next 5-10 years are detailed below. Some tests may not apply to you based off risk factors and/or age. Screening tests ordered at today's visit but not completed yet may show as past due. Also, please note that scanned in results may not display below.  Preventive Screenings:  Service Recommendations Previous Testing/Comments   Colorectal Cancer Screening  Colonoscopy    Fecal Occult Blood Test (FOBT)/Fecal Immunochemical Test (FIT)  Fecal DNA/Cologuard Test  Flexible Sigmoidoscopy Age: 45-75 years old   Colonoscopy: every 10 years (May be performed more frequently if at higher risk)  OR  FOBT/FIT: every 1 year  OR  Cologuard: every 3 years  OR  Sigmoidoscopy: every 5 years  Screening may be recommended earlier than age 45 if at higher risk for colorectal cancer. Also, an individualized decision between you and your healthcare provider will decide whether screening between the ages of 76-85 would be appropriate. Colonoscopy: Not on file  FOBT/FIT: Not on file  Cologuard: Not on file  Sigmoidoscopy: Not on file          Prostate Cancer Screening Individualized decision between patient and health care provider in men between ages of 55-69   Medicare will cover every 12 months beginning on the day after your 50th birthday PSA: No results in last 5 years     Screening Not Indicated     Hepatitis C Screening Once for adults born between 1945 and 1965  More frequently in patients at high risk for Hepatitis C Hep C Antibody: 03/07/2020    Screening Current   Diabetes Screening 1-2 times per year if you're at risk for diabetes or have pre-diabetes Fasting glucose: 309 mg/dL (5/24/2023)  A1C: 11.4 (12/13/2023)  Screening Not Indicated  History Diabetes    Cholesterol Screening Once every 5 years if you don't have a lipid disorder. May order more often based on risk factors. Lipid panel: 05/24/2023  Screening Not Indicated  History Lipid Disorder      Other Preventive Screenings Covered by Medicare:  Abdominal Aortic Aneurysm (AAA) Screening: covered once if your at risk. You're considered to be at risk if you have a family history of AAA or a male between the age of 65-75 who smoking at least 100 cigarettes in your lifetime.  Lung Cancer Screening: covers low dose CT scan once per year if you meet all of the following conditions: (1) Age 55-77; (2) No signs or symptoms of lung cancer; (3) Current smoker or have quit smoking within the last 15 years; (4) You have a tobacco smoking history of at least 20 pack years (packs per day x number of years you smoked); (5) You get a written order from a healthcare provider.  Glaucoma Screening: covered annually if you're considered high risk: (1) You have diabetes OR (2) Family history of glaucoma OR (3)  aged 50 and older OR (4)  American aged 65 and older  Osteoporosis Screening: covered every 2 years if you meet one of the following conditions: (1) Have a vertebral abnormality; (2) On glucocorticoid therapy for more than 3 months; (3) Have primary hyperparathyroidism; (4) On osteoporosis medications and need to assess response to drug therapy.  HIV Screening: covered annually if you're between the age of 15-65. Also covered annually if you are younger than 15 and older than 65 with risk factors for HIV infection. For pregnant patients, it is covered up to 3 times per pregnancy.    Immunizations:  Immunization Recommendations   Influenza Vaccine Annual influenza vaccination during flu season is recommended for all persons aged >= 6 months who do not have contraindications   Pneumococcal Vaccine   * Pneumococcal conjugate vaccine = PCV13 (Prevnar 13), PCV15 (Vaxneuvance), PCV20 (Prevnar 20)  *  Pneumococcal polysaccharide vaccine = PPSV23 (Pneumovax) Adults 19-63 yo with certain risk factors or if 65+ yo  If never received any pneumonia vaccine: recommend Prevnar 20 (PCV20)  Give PCV20 if previously received 1 dose of PCV13 or PPSV23   Hepatitis B Vaccine 3 dose series if at intermediate or high risk (ex: diabetes, end stage renal disease, liver disease)   Respiratory syncytial virus (RSV) Vaccine - COVERED BY MEDICARE PART D  * RSVPreF3 (Arexvy) CDC recommends that adults 60 years of age and older may receive a single dose of RSV vaccine using shared clinical decision-making (SCDM)   Tetanus (Td) Vaccine - COST NOT COVERED BY MEDICARE PART B Following completion of primary series, a booster dose should be given every 10 years to maintain immunity against tetanus. Td may also be given as tetanus wound prophylaxis.   Tdap Vaccine - COST NOT COVERED BY MEDICARE PART B Recommended at least once for all adults. For pregnant patients, recommended with each pregnancy.   Shingles Vaccine (Shingrix) - COST NOT COVERED BY MEDICARE PART B  2 shot series recommended in those 19 years and older who have or will have weakened immune systems or those 50 years and older     Health Maintenance Due:      Topic Date Due   • Colorectal Cancer Screening  Never done   • Hepatitis C Screening  Completed     Immunizations Due:      Topic Date Due   • COVID-19 Vaccine (2 - 2023-24 season) 09/01/2023     Advance Directives   What are advance directives?  Advance directives are legal documents that state your wishes and plans for medical care. These plans are made ahead of time in case you lose your ability to make decisions for yourself. Advance directives can apply to any medical decision, such as the treatments you want, and if you want to donate organs.   What are the types of advance directives?  There are many types of advance directives, and each state has rules about how to use them. You may choose a combination of any of  the following:  Living will:  This is a written record of the treatment you want. You can also choose which treatments you do not want, which to limit, and which to stop at a certain time. This includes surgery, medicine, IV fluid, and tube feedings.   Durable power of  for healthcare (DPAHC):  This is a written record that states who you want to make healthcare choices for you when you are unable to make them for yourself. This person, called a proxy, is usually a family member or a friend. You may choose more than 1 proxy.  Do not resuscitate (DNR) order:  A DNR order is used in case your heart stops beating or you stop breathing. It is a request not to have certain forms of treatment, such as CPR. A DNR order may be included in other types of advance directives.  Medical directive:  This covers the care that you want if you are in a coma, near death, or unable to make decisions for yourself. You can list the treatments you want for each condition. Treatment may include pain medicine, surgery, blood transfusions, dialysis, IV or tube feedings, and a ventilator (breathing machine).  Values history:  This document has questions about your views, beliefs, and how you feel and think about life. This information can help others choose the care that you would choose.  Why are advance directives important?  An advance directive helps you control your care. Although spoken wishes may be used, it is better to have your wishes written down. Spoken wishes can be misunderstood, or not followed. Treatments may be given even if you do not want them. An advance directive may make it easier for your family to make difficult choices about your care.   Weight Management   Why it is important to manage your weight:  Being overweight increases your risk of health conditions such as heart disease, high blood pressure, type 2 diabetes, and certain types of cancer. It can also increase your risk for osteoarthritis, sleep apnea,  and other respiratory problems. Aim for a slow, steady weight loss. Even a small amount of weight loss can lower your risk of health problems.  How to lose weight safely:  A safe and healthy way to lose weight is to eat fewer calories and get regular exercise. You can lose up about 1 pound a week by decreasing the number of calories you eat by 500 calories each day.   Healthy meal plan for weight management:  A healthy meal plan includes a variety of foods, contains fewer calories, and helps you stay healthy. A healthy meal plan includes the following:  Eat whole-grain foods more often.  A healthy meal plan should contain fiber. Fiber is the part of grains, fruits, and vegetables that is not broken down by your body. Whole-grain foods are healthy and provide extra fiber in your diet. Some examples of whole-grain foods are whole-wheat breads and pastas, oatmeal, brown rice, and bulgur.  Eat a variety of vegetables every day.  Include dark, leafy greens such as spinach, kale, virgie greens, and mustard greens. Eat yellow and orange vegetables such as carrots, sweet potatoes, and winter squash.   Eat a variety of fruits every day.  Choose fresh or canned fruit (canned in its own juice or light syrup) instead of juice. Fruit juice has very little or no fiber.  Eat low-fat dairy foods.  Drink fat-free (skim) milk or 1% milk. Eat fat-free yogurt and low-fat cottage cheese. Try low-fat cheeses such as mozzarella and other reduced-fat cheeses.  Choose meat and other protein foods that are low in fat.  Choose beans or other legumes such as split peas or lentils. Choose fish, skinless poultry (chicken or turkey), or lean cuts of red meat (beef or pork). Before you cook meat or poultry, cut off any visible fat.   Use less fat and oil.  Try baking foods instead of frying them. Add less fat, such as margarine, sour cream, regular salad dressing and mayonnaise to foods. Eat fewer high-fat foods. Some examples of high-fat foods  include french fries, doughnuts, ice cream, and cakes.  Eat fewer sweets.  Limit foods and drinks that are high in sugar. This includes candy, cookies, regular soda, and sweetened drinks.  Exercise:  Exercise at least 30 minutes per day on most days of the week. Some examples of exercise include walking, biking, dancing, and swimming. You can also fit in more physical activity by taking the stairs instead of the elevator or parking farther away from stores. Ask your healthcare provider about the best exercise plan for you.    © Copyright [employer] 2018 Information is for End User's use only and may not be sold, redistributed or otherwise used for commercial purposes. All illustrations and images included in CareNotes® are the copyrighted property of [employer] Inc[employer] or e-channel    Control de la diabetes tipo 2 en los adultos   CUIDADO AMBULATORIO:   La diabetes tipo 2 es michael enfermedad que afecta la forma en que el cuerpo utiliza la glucosa (azúcar). El cuerpo no puede producir suficiente insulina o es incapaz de usarla adecuadamente. Es importante controlar la diabetes para evitar el daño al corazón, los vasos sanguíneos y otros órganos. El control lo ayudará a sentirse janey y a disfrutar de diana actividades diarias. Diana médicos del equipo de cuidado de la diabetes pueden ayudarlo a hacer un plan para que el cuidado de la diabetes encaje en rider horario. Rider plan puede cambiar con el tiempo para adaptarse a diana necesidades y a las de rider ronaldo.       Pídale a alguien que llame al número de emergencias local (911 en los [place]) si:  No es posible despertarlo.    Tiene signos de cetoacidosis diabética:     confusión, fatiga    vómitos    latidos cardíacos rápidos    aliento con olor a frutas    sed extrema    sequedad en la boca y la piel    Tiene alguno de los siguientes signos de un ataque cardíaco:      Estrujamiento, presión o tensión en rider pecho    Usted también podría presentar alguno de los  siguientes:     Malestar o dolor en rider espalda, marquis, mandíbula, abdomen, o brazo    Falta de aliento    Náuseas o vómitos    Desvanecimiento o sudor frío repentino    Usted tiene alguno de los siguientes signos de derrame cerebral:      Adormecimiento o caída de un lado de rider lucas    Debilidad en un brazo o michael pierna    Confusión o debilidad para hablar    Mareos o dolor de rosa intenso, o pérdida de la visión.    Llame a rider médico o al equipo de cuidado de la diabetes si:  Tiene michael llaga o michael herida que no cicatrizan.    Tiene un cambio en la cantidad de orina.    Ann niveles de azúcar en la arun son superiores a las metas fijadas.    Usted a menudo tiene niveles de azúcar en la arun más bajos que ann metas fijadas.    Rider piel está enrojecida, seca, caliente al tacto o inflamada.    Usted tiene problemas para sobrellevar rider diabetes o se siente ansioso o deprimido.    Tiene problemas para seguir alguna parte de rider plan de atención, makayla el plan de comidas.    Usted tiene preguntas o inquietudes acerca de rider condición o cuidado.    Lo que usted necesita saber sobre los niveles altos de azúcar en la arun: El azúcar alto en la arun puede no causar ningún síntoma. Puede sentir más sed u orinar con más frecuencia de lo habitual. Con el tiempo, los niveles altos de azúcar en la arun pueden dañar ann nervios, vasos sanguíneos, tejidos y órganos. Lo siguiente aumenta los niveles de azúcar en la arun:  Comidas copiosas o grandes cantidades de carbohidratos de michael john vez    Menos actividad física    Estrés    Enfermedad    Michael dosis yazmin de medicamento para la diabetes o insulina, o michael dosis tardía    Lo que usted necesita saber sobre los niveles bajos de azúcar en la arun: Los síntomas incluyen sensación de temblores, mareos, irritabilidad o confusión. Puedes prevenir los síntomas evitando que los niveles de azúcar en arun bajen demasiado.  Trate los niveles bajos de azúcar en la arun  inmediatamente:     Brielle 4 onzas de jugo o 1 tubo de gel de glucosa.    Revise nuevamente rider nivel de azúcar en la arun en 10 a 15 minutos.    Cuando el nivel regrese a la normalidad, coma un alimento o refrigerio para prevenir otro bajón.       Lleve siempre consigo gel de glucosa, uvas pasas o caramelos duros para tratar los niveles bajos de azúcar en la arun.     Rider azúcar en la arun puede bajar demasiado si caitlin un medicamento para la diabetes o la insulina y no consume suficientes alimentos.     Si usa insulina, verifique rider nivel de azúcar en la arun antes de hacer ejercicio.     Si rider nivel de azúcar en la arun es inferior a 100 mg/dL, coma 4 galletas, 2 onzas de uvas pasas o brielle 4 onzas de jugo.    Compruebe rider nivel cada 30 minutos si hace ejercicio roma más de 1 hora.    Puede que necesite michael merienda roma o después de hacer ejercicio.    Qué puede hacer para manejar ann niveles de azúcar en la arun:  Revise ann niveles de azúcar en la arun según las indicaciones y según sea necesario. Hay varios elementos disponibles que puede utilizar para comprobar ann niveles. Puede que tenga que comprobarlo probando michael gota de arun en un medidor de glucosa. En rider lugar, es posible que le den un monitor continuo de glucosa (MCG). El dispositivo se lleva puesto en todo momento. El MCG revisa rider nivel de azúcar en la arun cada 5 minutos. Envía los resultados a un dispositivo electrónico, makayla un teléfono inteligente. Un MCG puede utilizarse con o sin michael bomba de insulina. Usted y los médicos de rider equipo de atención de la diabetes decidirán cuál es el mejor método para usted. El objetivo es lograr que los niveles de azúcar en arun antes de las comidas estén  entre 80 y 130 mg/dL y 2 horas después de comer  maykel inferiores a 180 mg/dL.            Elija opciones de alimentos saludables. Consulte con rider dietista para crear un plan de comidas que funcione para usted y ann horarios. Un dietista  puede ayudarlo para que aprenda cómo alimentarse janey con la cantidad adecuada de carbohidratos (azúcar y los alimentos que contienen almidón) roma las comidas y meriendas. Algunos ejemplos de carbohidratos son el pan, los cereales, el arroz, la pasta, la fruta, los lácteos bajos en grasa y los dulces. Los carbohidratos pueden subir rider azúcar en la arun si usted come demasiado a la vez.         Coma alimentos altos en fibras, según las indicaciones. La fibra ayuda a mejorar los niveles de azúcar en la arun. La fibra también reduce el riesgo de padecer enfermedades cardíacas y otros problemas que puede causar la diabetes. Por ejemplo, los alimentos ricos en fibra verduras, pan integral y frijoles, makayla los frijoles pintos. Rider dietista puede indicarle cuánta fibra debe consumir cada día.         Realice actividad física regularmente. La actividad física puede ayudarlo a alcanzar rider objetivo de nivel de azúcar en arun y a controlar rider peso. Theo al menos 150 minutos de actividad física aeróbica de moderada a vigorosa cada semana. El entrenamiento de resistencia, makayla el levantamiento de pesas, debe realizarse 3 veces por semana. No deje de realizarla roma más de 2 días seguidos. No permanezca sentada por más de 30 minutos cada vez. Rider médico puede ayudarle a crear un plan de actividades. El plan puede incluir las mejores actividades para usted y puede ayudarlo a desarrollar rider fuerza y resistencia.            Mantenga un peso saludable. Pregúntele a rider equipo cuál es el peso ideal para usted. El peso saludable puede ayudarle a controlar rider diabetes y a evitar michael enfermedad cardíaca. Pídale a rider equipo que lo ayude a elaborar un plan para perder peso, si lo necesita. Incluso reducir del 3% al 7% de rider peso corporal puede ayudarlo a hacer michael diferencia en el control de rider diabetes. Rider equipo establecerá michael meta de pérdida de peso, de entre 10 a 15 libras o de un 5% de rider sobrepeso. Juntos, usted y rider  equipo, podrán fijar metas de pérdida de peso alcanzables.    Tómese rider medicamento para la diabetes o la insulina según las indicaciones. Es posible que necesite medicamento para la diabetes, insulina o ambos para controlar diana niveles de glucosa en arun. Rider médico le indicará cómo y cuándo se debe rylee rider medicamento de diabetes o la insulina. También se le enseñarán los efectos secundarios que pueden causar los medicamentos orales para la diabetes. La insulina puede administrarse mediante inyección o michael bomba o michael pluma. Usted y los médicos decidirán cuál es el mejor método para usted:    La bomba de insulina es un dispositivo implantado que le da insulina las 24 horas del día. Michael bomba de insulina previene la necesidad de inyecciones múltiples de insulina en un día.         La pluma para insulina es un dispositivo precargado con la cantidad adecuada de insulina.         A usted y rider ronaldo les enseñarán cómo preparar y administrar la insulina si rosette es el mejor método para usted. Diana médicos también le enseñarán cómo desechar las agujas y jeringas.    Aprenderá la cantidad de insulina que necesita y cuándo administrarla. Se le enseñará cuándo no administrar la insulina. También se le enseñará lo que debe hacer si rider nivel de azúcar en la arun baja demasiado. [place] puede suceder si usted caitlin insulina y no come la cantidad adecuada de carbohidratos.    Más maneras para controlar la diabetes tipo 2:  Use identificación de alerta médica. Use un brazalete o collar de alerta médica o lleve consigo michael tarjeta que indique que tiene diabetes. Pregunte a rider médico dónde puede conseguir esos artículos.         No fume. La nicotina y otras sustancias químicas de los cigarrillos y los cigarros pueden dañar el pulmón y los vasos sanguíneos. También dificulta el control de la diabetes. Pida información a rider médico si usted actualmente fuma y necesita ayuda para dejar de fumar. No use cigarrillos electrónicos o  tabaco sin humo en vez de cigarrillos o para tratar de dejar de fumar. Todos estos aún contienen nicotina.    Revise ann pies todos los días por cortadas, raspones, callos u otras heridas. Shannan pendiente de enrojecimiento e inflamación y de calor al tacto. Use zapatos que le calcen janey. Compruebe que no haya piedras u otros objetos dentro de ann zapatos que le podrían lastimar los pies. No camine descalzo ni use zapatos sin calcetines. Use calcetines de algodón para ayudar a mantener secos los pies.         Pregunte sobre las vacunas que pudiera necesitar. Usted corre un mayor riesgo de presentar enfermedades graves si se contagia gripe, neumonía, COVID-19 o hepatitis. Pregunte a rider médico si debe vacunarse para prevenir estas u otras enfermedades, y cuándo debe hacerlo.    Hable con rider médico si se siente estresado por el cuidado de la diabetes. A veces, encajar el cuidado de la diabetes en rider lubna puede provocar un aumento del estrés. El estrés puede hacer que no se cuide adecuadamente. Rider médico puede ayudarlo con consejos sobre el autocuidado. El profesional de la dylan mental puede escuchar y ofrecer ayuda en cuestiones de cuidado personal. Otros tipos de terapia pueden ayudarlo a realizar cambios nutricionales o en rider actividad física.    Hágase un control de la A1c según las indicaciones. Rider médico puede comprobar rider A1c cada 3 meses, o 2 veces al año si rider diabetes está controlada. El examen de A1c muestra la cantidad promedio de azúcar en rider arun roma los últimos 2 a 3 meses. Rider médico le dirá cuál debe ser rider nivel de A1c.    Hágase las pruebas de detección makayla se le indique. Rider médico podría recomendarle que se faith pruebas para detectar complicaciones de la diabetes y otras condiciones que puedan desarrollarse. Algunas pruebas de detección pueden comenzar inmediatamente y otros pueden ocurrir dentro de los primeros 5 años del diagnóstico:    Los ejemplos de complicaciones de la diabetes incluyen  problemas renales, colesterol alto, presión arterial bryson, problemas vasculares, problemas oculares y apnea del sueño.    Se le puede hacer michael prueba para detectar niveles bajos de vitamina B si caitlin medicamentos orales para la diabetes roma mucho tiempo.    Pueden hacerle pruebas para detectar el síndrome de ovario poliquístico (SOPQ) si tiene edad fecunda.    Theo un seguimiento con rider médico o con los proveedores del equipo de cuidado de la diabetes según las instrucciones: Es posible que a usted le angel análisis de arun antes de la gurpreet de control. Los resultados de las pruebas mostrarán si es necesario hacer cambios en el tratamiento o en los cuidados personales. Hable con rider médico si no puede pagar ann medicamentos. Anote ann preguntas para que se acuerde de hacerlas roma ann visitas.  © Copyright [employer] 2023 Information is for End User's use only and may not be sold, redistributed or otherwise used for commercial purposes.  Esta información es sólo para uso en educación. Rider intención no es darle un consejo médico sobre enfermedades o tratamientos. Colsulte con rider médico, enfermera o farmacéutico antes de seguir cualquier régimen médico para saber si es seguro y efectivo para usted.

## 2024-06-17 NOTE — ASSESSMENT & PLAN NOTE
Diabetes is completely uncontrolled, hemoglobin A1c of 14.7 today.  He will follow-up with his endocrinologist.  He is compliant with his current meds.  He is currently on metformin 750 mg daily,  Trulicity 0.75 mg weekly, NovoLog 70/30 50 units twice a day, Jardiance 25 mg daily.  He was not compliant with his low-carb diet.  He will try to be more consistent with that.

## 2024-06-17 NOTE — PROGRESS NOTES
Ambulatory Visit  Name: Wagner Ochoa      : 1950      MRN: [id number]  Encounter Provider: Ricardo Presley MD  Encounter Date: 2024   Encounter department: [facility] PRIMARY CARE    Assessment & Plan   1. Medicare annual wellness visit, subsequent  2. Type 2 diabetes mellitus with hyperglycemia, with long-term current use of insulin (HCC)  -     Albumin / creatinine urine ratio; Future  -     IRIS Diabetic eye exam  -     POCT hemoglobin A1c  3. Essential hypertension  Assessment & Plan:  Blood pressures are very well-controlled on current dose of amlodipine and losartan.  Will recheck his kidney function and electrolytes.  Orders:  -     Comprehensive metabolic panel; Future  4. Mixed hyperlipidemia  Assessment & Plan:  Recheck lipid panel.  Continue current dose of atorvastatin and Zetia.  Orders:  -     Lipid panel; Future  5. Colon cancer screening  -     Cologuard  6. Screening for blood or protein in urine  -     UA (URINE) with reflex to Scope; Future  7. Screening for prostate cancer  -     PSA, Total Screen; Future  8. Screening for deficiency anemia  -     CBC and differential; Future  9. Screening for hypothyroidism  -     TSH, 3rd generation with Free T4 reflex; Future  10. Continuous opioid dependence (HCC)  Assessment & Plan:  Will run urine drug screen.  11. Type 2 diabetes mellitus with stage 3 chronic kidney disease, with long-term current use of insulin, unspecified whether stage 3a or 3b CKD (HCC)  Assessment & Plan:  Diabetes is completely uncontrolled, hemoglobin A1c of 14.7 today.  He will follow-up with his endocrinologist.  He is compliant with his current meds.  He is currently on metformin 750 mg daily,  Trulicity 0.75 mg weekly, NovoLog 70/30 50 units twice a day, Jardiance 25 mg daily.  He was not compliant with his low-carb diet.  He will try to be more consistent with that.    12. Pain in both feet  Assessment & Plan:  Likely due to  peripheral polyneuropathy due to uncontrolled diabetes.  He complains of bilateral foot pain that limits his ambulation.  Will refer to podiatry.  Orders:  -     Ambulatory Referral to Podiatry; Future  13. Chronic midline low back pain with right-sided sciatica  Assessment & Plan:  Controlled on tramadol, he uses it very sparingly.  Denies any side effects.  Will run urine drug screen.  Last dose of tramadol was 48 hours ago.  Orders:  -     [facility] All Prescribed Meds and Special Instructions  -     Amphetamines, Methamphetamines  -     Butalbital  -     Phenobarbital  -     Secobarbital  -     Alprazolam  -     Clonazepam  -     Diazepam  -     Lorazepam  -     Gabapentin  -     Pregabalin  -     Cocaine  -     Heroin  -     Buprenorphine  -     Levorphanol  -     Meperidine  -     Naltrexone  -     Fentanyl  -     Methadone  -     Oxycodone  -     Tapentadol  -     THC  -     Tramadol  -     Codeine, Hydrocodone, Hydropmorphone, Morphine  -     Bath Salts  -     Ethyl Glucuronide/Ethyl Sulfate  -     Kratom  -     Spice  -     Methylphenidate  -     Phentermine  -     Validity Oxidant  -     Validity Creatinine  -     Validity pH  -     Validity Specific  -     Xylazine Definitive Test       Preventive health issues were discussed with patient, and age appropriate screening tests were ordered as noted in patient's After Visit Summary. Personalized health advice and appropriate referrals for health education or preventive services given if needed, as noted in patient's After Visit Summary.    History of Present Illness     Patient came today for Medicare wellness visit and to follow-up on his chronic problems.  He is up-to-date on his vaccinations except of Shingrix, he would like to hold off as for now.  He agreed for cologuard and PSA.  He does not follow a low-carb diet.  He is trying to be physically active.         Patient Care Team:  Ricardo Presley MD as PCP - General (Internal Medicine)  [facility] (Podiatry)    Review of Systems   Constitutional:  Negative for activity change, chills, fatigue and fever.   HENT:  Negative for congestion, ear pain, sinus pressure and sore throat.    Eyes:  Negative for pain and visual disturbance.   Respiratory:  Negative for cough, chest tightness, shortness of breath and wheezing.    Cardiovascular:  Negative for chest pain, palpitations and leg swelling.   Gastrointestinal:  Negative for abdominal pain, blood in stool, constipation, diarrhea, nausea and vomiting.   Endocrine: Negative for polydipsia and polyuria.   Genitourinary:  Negative for difficulty urinating, dysuria, frequency and urgency.   Musculoskeletal:  Positive for arthralgias and back pain. Negative for joint swelling and myalgias.   Skin:  Negative for rash.   Neurological:  Negative for dizziness, weakness, numbness and headaches.   Hematological:  Negative for adenopathy. Does not bruise/bleed easily.   Psychiatric/Behavioral:  Negative for dysphoric mood. The patient is not nervous/anxious.      Medical History Reviewed by provider this encounter:       Annual Wellness Visit Questionnaire   Wagner is here for his Subsequent Wellness visit.     Health Risk Assessment:   Patient rates overall health as fair. Patient feels that their physical health rating is slightly worse. Patient is satisfied with their life. Eyesight was rated as same. Hearing was rated as slightly worse. Patient feels that their emotional and mental health rating is same. Patients states they are never, rarely angry. Patient states they are often unusually tired/fatigued. Pain experienced in the last 7 days has been a lot. Patient's pain rating has been 10/10. Patient states that he has experienced weight loss or gain in last 6 months.     Depression Screening:   PHQ-2 Score: 0      Fall Risk Screening:   In the past year, patient has experienced: no history of falling in past year      Home Safety:  Patient has  trouble with stairs inside or outside of their home. Patient has working smoke alarms and has working carbon monoxide detector. Home safety hazards include: none.     Nutrition:   Current diet is Diabetic.     Medications:   Patient is not currently taking any over-the-counter supplements. Patient is able to manage medications.     Activities of Daily Living (ADLs)/Instrumental Activities of Daily Living (IADLs):   Walk and transfer into and out of bed and chair?: No  Dress and groom yourself?: Yes    Bathe or shower yourself?: Yes    Feed yourself? Yes  Do your laundry/housekeeping?: Yes  Manage your money, pay your bills and track your expenses?: Yes  Make your own meals?: Yes    Do your own shopping?: Yes    Advance Care Planning:   Living will: No    Durable POA for healthcare: No    Advanced directive: No      PREVENTIVE SCREENINGS      Cardiovascular Screening:    General: Screening Not Indicated and History Lipid Disorder      Diabetes Screening:     General: Screening Not Indicated and History Diabetes      Prostate Cancer Screening:    General: Screening Not Indicated      Abdominal Aortic Aneurysm (AAA) Screening:    Risk factors include: age between 65-74 yo and tobacco use        Lung Cancer Screening:     General: Screening Not Indicated      Hepatitis C Screening:    General: Screening Current    Screening, Brief Intervention, and Referral to Treatment (SBIRT)    Screening  Typical number of drinks in a day: 0  Typical number of drinks in a week: 0  Interpretation: Low risk drinking behavior.    Single Item Drug Screening:  How often have you used an illegal drug (including marijuana) or a prescription medication for non-medical reasons in the past year? never    Single Item Drug Screen Score: 0  Interpretation: Negative screen for possible drug use disorder    Social Determinants of Health     Financial Resource Strain: Low Risk  (6/9/2023)    Overall Financial Resource Strain (CARDIA)     Difficulty  "of Paying Living Expenses: Not hard at all   Food Insecurity: No Food Insecurity (6/17/2024)    Hunger Vital Sign     Worried About Running Out of Food in the Last Year: Never true     Ran Out of Food in the Last Year: Never true   Transportation Needs: No Transportation Needs (6/17/2024)    PRAPARE - Transportation     Lack of Transportation (Medical): No     Lack of Transportation (Non-Medical): No   Housing Stability: Unknown (6/17/2024)    Housing Stability Vital Sign     Unable to Pay for Housing in the Last Year: No   Utilities: Not At Risk (6/17/2024)    [facility]     Threatened with loss of utilities: No     No results found.    Objective     /64 (BP Location: Left arm, Patient Position: Sitting, Cuff Size: Standard)   Pulse 67   Temp (!) 97 °F (36.1 °C) (Tympanic)   Resp 20   Ht 5' 7\" (1.702 m)   Wt 75.6 kg (166 lb 9.6 oz)   SpO2 100%   BMI 26.09 kg/m²     Physical Exam  Vitals reviewed.   Constitutional:       Appearance: Normal appearance.   Eyes:      Pupils: Pupils are equal, round, and reactive to light.   Cardiovascular:      Rate and Rhythm: Normal rate and regular rhythm.      Pulses:           Posterior tibial pulses are 1+ on the right side and 1+ on the left side.      Heart sounds: No murmur heard.     No friction rub. No gallop.   Pulmonary:      Effort: Pulmonary effort is normal. No respiratory distress.      Breath sounds: Normal breath sounds. No wheezing, rhonchi or rales.   Abdominal:      General: There is no distension.      Tenderness: There is no abdominal tenderness. There is no guarding or rebound.   Musculoskeletal:         General: Tenderness present. No swelling.   Feet:      Right foot:      Skin integrity: No ulcer, skin breakdown, erythema, warmth, callus or dry skin.      Left foot:      Skin integrity: No ulcer, skin breakdown, erythema, warmth, callus or dry skin.   Skin:     Coloration: Skin is not jaundiced or pale.      Findings: No rash.   Neurological: "      Mental Status: He is alert.   Psychiatric:         Mood and Affect: Mood normal.         Behavior: Behavior normal.       Administrative Statements

## 2024-06-17 NOTE — TELEPHONE ENCOUNTER
Please ask him to come back today if he can as we need a urine sample. The one that was collected is not enough for the tests requested. You may try to find Rasheeda Salazar as the pt is fluent is [place].

## 2024-06-19 DIAGNOSIS — R53.83 OTHER FATIGUE: Primary | ICD-10-CM

## 2024-06-19 RX ORDER — FOLIC ACID 30; 120; 200; 20; 35; 8; 1000; 200; 2.3; 45; 20; 20; 3.4; 55; 3; 1500; 25 MG/1; MG/1; MG/1; UG/1; UG/1; UG/1; UG/1; MG/1; MG/1; UG/1; MG/1; MG/1; MG/1; UG/1; MG/1; UG/1; MG/1
1 TABLET ORAL DAILY
Qty: 90 TABLET | Refills: 3 | Status: SHIPPED | OUTPATIENT
Start: 2024-06-19

## 2024-06-21 DIAGNOSIS — Z79.4 TYPE 2 DIABETES MELLITUS WITH HYPERGLYCEMIA, WITH LONG-TERM CURRENT USE OF INSULIN (HCC): ICD-10-CM

## 2024-06-21 DIAGNOSIS — E11.65 TYPE 2 DIABETES MELLITUS WITH HYPERGLYCEMIA, WITH LONG-TERM CURRENT USE OF INSULIN (HCC): ICD-10-CM

## 2024-06-21 RX ORDER — ALCOHOL ANTISEPTIC PADS
PADS, MEDICATED (EA) TOPICAL
Qty: 100 EACH | Refills: 6 | Status: SHIPPED | OUTPATIENT
Start: 2024-06-21

## 2024-06-22 DIAGNOSIS — N18.30 TYPE 2 DIABETES MELLITUS WITH STAGE 3 CHRONIC KIDNEY DISEASE, WITH LONG-TERM CURRENT USE OF INSULIN, UNSPECIFIED WHETHER STAGE 3A OR 3B CKD (HCC): ICD-10-CM

## 2024-06-22 DIAGNOSIS — E11.22 TYPE 2 DIABETES MELLITUS WITH STAGE 3 CHRONIC KIDNEY DISEASE, WITH LONG-TERM CURRENT USE OF INSULIN, UNSPECIFIED WHETHER STAGE 3A OR 3B CKD (HCC): ICD-10-CM

## 2024-06-22 DIAGNOSIS — Z79.4 TYPE 2 DIABETES MELLITUS WITH STAGE 3 CHRONIC KIDNEY DISEASE, WITH LONG-TERM CURRENT USE OF INSULIN, UNSPECIFIED WHETHER STAGE 3A OR 3B CKD (HCC): ICD-10-CM

## 2024-06-22 RX ORDER — METFORMIN HYDROCHLORIDE 750 MG/1
750 TABLET, EXTENDED RELEASE ORAL
Qty: 60 TABLET | Refills: 0 | Status: SHIPPED | OUTPATIENT
Start: 2024-06-22

## 2024-06-22 NOTE — TELEPHONE ENCOUNTER
Lab orders just placed 6/14 - giving 60 day supply to give patient time to get labs done before September appointment

## 2024-06-25 LAB
4OH-XYLAZINE UR QL CFM: NEGATIVE NG/ML
6MAM UR QL CFM: NEGATIVE NG/ML
7AMINOCLONAZEPAM UR QL CFM: NEGATIVE NG/ML
A-OH ALPRAZ UR QL CFM: NEGATIVE NG/ML
ACCEPTABLE CREAT UR QL: NORMAL MG/DL
ACCEPTIBLE SP GR UR QL: NORMAL
AMPHET UR QL CFM: NEGATIVE NG/ML
BUPRENORPHINE UR QL CFM: NEGATIVE NG/ML
BUTALBITAL UR QL CFM: NEGATIVE NG/ML
BZE UR QL CFM: NEGATIVE NG/ML
CODEINE UR QL CFM: NEGATIVE NG/ML
EDDP UR QL CFM: NEGATIVE NG/ML
ETHYL GLUCURONIDE UR QL CFM: NEGATIVE NG/ML
ETHYL SULFATE UR QL SCN: NEGATIVE NG/ML
EUTYLONE UR QL: NEGATIVE NG/ML
FENTANYL UR QL CFM: NEGATIVE NG/ML
GLIADIN IGG SER IA-ACNC: NEGATIVE NG/ML
HYDROCODONE UR QL CFM: NEGATIVE NG/ML
HYDROMORPHONE UR QL CFM: NEGATIVE NG/ML
LORAZEPAM UR QL CFM: NEGATIVE NG/ML
ME-PHENIDATE UR QL CFM: NEGATIVE NG/ML
MEPERIDINE UR QL CFM: NEGATIVE NG/ML
METHADONE UR QL CFM: NEGATIVE NG/ML
METHAMPHET UR QL CFM: NEGATIVE NG/ML
MORPHINE UR QL CFM: NEGATIVE NG/ML
NALTREXONE UR QL CFM: NEGATIVE NG/ML
NITRITE UR QL: NORMAL UG/ML
NORBUPRENORPHINE UR QL CFM: NEGATIVE NG/ML
NORDIAZEPAM UR QL CFM: NEGATIVE NG/ML
NORFENTANYL UR QL CFM: NEGATIVE NG/ML
NORHYDROCODONE UR QL CFM: NEGATIVE NG/ML
NORMEPERIDINE UR QL CFM: NEGATIVE NG/ML
NOROXYCODONE UR QL CFM: NEGATIVE NG/ML
OXAZEPAM UR QL CFM: NEGATIVE NG/ML
OXYCODONE UR QL CFM: NEGATIVE NG/ML
OXYMORPHONE UR QL CFM: NEGATIVE NG/ML
PARA-FLUOROFENTANYL QUANTIFICATION: NORMAL NG/ML
PHENOBARB UR QL CFM: NEGATIVE NG/ML
RESULT ALL_PRESCRIBED MEDS AND SPECIAL INSTRUCTIONS: NORMAL
SECOBARBITAL UR QL CFM: NEGATIVE NG/ML
SL AMB 4-ANPP QUANTIFICATION: NORMAL NG/ML
SL AMB 5F-ADB-M7 METABOLITE QUANTIFICATION: NEGATIVE NG/ML
SL AMB 7-OH-MITRAGYNINE (KRATOM ALKALOID) QUANTIFICATION: NEGATIVE NG/ML
SL AMB AB-FUBINACA-M3 METABOLITE QUANTIFICATION: NEGATIVE NG/ML
SL AMB ACETYL FENTANYL QUANTIFICATION: NORMAL NG/ML
SL AMB ACETYL NORFENTANYL QUANTIFICATION: NORMAL NG/ML
SL AMB ACRYL FENTANYL QUANTIFICATION: NORMAL NG/ML
SL AMB CARFENTANIL QUANTIFICATION: NORMAL NG/ML
SL AMB CTHC (MARIJUANA METABOLITE) QUANTIFICATION: NEGATIVE NG/ML
SL AMB DEXTRORPHAN (DEXTROMETHORPHAN METABOLITE) QUANT: NEGATIVE NG/ML
SL AMB GABAPENTIN QUANTIFICATION: NORMAL NG/ML
SL AMB JWH018 METABOLITE QUANTIFICATION: NEGATIVE NG/ML
SL AMB JWH073 METABOLITE QUANTIFICATION: NEGATIVE NG/ML
SL AMB MDMB-FUBINACA-M1 METABOLITE QUANTIFICATION: NEGATIVE NG/ML
SL AMB METHYLONE QUANTIFICATION: NEGATIVE NG/ML
SL AMB N-DESMETHYL-TRAMADOL QUANTIFICATION: NORMAL NG/ML
SL AMB PHENTERMINE QUANTIFICATION: NEGATIVE NG/ML
SL AMB PREGABALIN QUANTIFICATION: NEGATIVE NG/ML
SL AMB RCS4 METABOLITE QUANTIFICATION: NEGATIVE NG/ML
SL AMB RITALINIC ACID QUANTIFICATION: NEGATIVE NG/ML
SMOOTH MUSCLE AB TITR SER IF: NEGATIVE NG/ML
SPECIMEN DRAWN SERPL: NEGATIVE NG/ML
SPECIMEN PH ACCEPTABLE UR: NORMAL
TAPENTADOL UR QL CFM: NEGATIVE NG/ML
TEMAZEPAM UR QL CFM: NEGATIVE NG/ML
TRAMADOL UR QL CFM: NORMAL NG/ML
URATE/CREAT 24H UR: NORMAL NG/ML
XYLAZINE UR QL CFM: NEGATIVE NG/ML

## 2024-06-28 DIAGNOSIS — M54.41 CHRONIC MIDLINE LOW BACK PAIN WITH RIGHT-SIDED SCIATICA: ICD-10-CM

## 2024-06-28 DIAGNOSIS — G89.29 CHRONIC MIDLINE LOW BACK PAIN WITH RIGHT-SIDED SCIATICA: ICD-10-CM

## 2024-07-01 ENCOUNTER — APPOINTMENT (OUTPATIENT)
Dept: LAB | Facility: HOSPITAL | Age: 74
End: 2024-07-01
Payer: MEDICARE

## 2024-07-01 DIAGNOSIS — E78.2 MIXED HYPERLIPIDEMIA: ICD-10-CM

## 2024-07-01 DIAGNOSIS — I10 ESSENTIAL HYPERTENSION: ICD-10-CM

## 2024-07-01 DIAGNOSIS — Z79.4 TYPE 2 DIABETES MELLITUS WITH HYPERGLYCEMIA, WITH LONG-TERM CURRENT USE OF INSULIN (HCC): ICD-10-CM

## 2024-07-01 DIAGNOSIS — E11.65 TYPE 2 DIABETES MELLITUS WITH HYPERGLYCEMIA, WITH LONG-TERM CURRENT USE OF INSULIN (HCC): ICD-10-CM

## 2024-07-01 DIAGNOSIS — Z13.89 SCREENING FOR BLOOD OR PROTEIN IN URINE: ICD-10-CM

## 2024-07-01 DIAGNOSIS — Z12.5 SCREENING FOR PROSTATE CANCER: ICD-10-CM

## 2024-07-01 DIAGNOSIS — Z13.0 SCREENING FOR DEFICIENCY ANEMIA: ICD-10-CM

## 2024-07-01 DIAGNOSIS — Z13.29 SCREENING FOR HYPOTHYROIDISM: ICD-10-CM

## 2024-07-01 LAB
ALBUMIN SERPL BCG-MCNC: 3.9 G/DL (ref 3.5–5)
ALP SERPL-CCNC: 75 U/L (ref 34–104)
ALT SERPL W P-5'-P-CCNC: 10 U/L (ref 7–52)
ANION GAP SERPL CALCULATED.3IONS-SCNC: 8 MMOL/L (ref 4–13)
AST SERPL W P-5'-P-CCNC: 15 U/L (ref 13–39)
BACTERIA UR QL AUTO: ABNORMAL /HPF
BASOPHILS # BLD AUTO: 0.05 THOUSANDS/ÂΜL (ref 0–0.1)
BASOPHILS NFR BLD AUTO: 1 % (ref 0–1)
BILIRUB SERPL-MCNC: 0.43 MG/DL (ref 0.2–1)
BILIRUB UR QL STRIP: NEGATIVE
BUN SERPL-MCNC: 17 MG/DL (ref 5–25)
CALCIUM SERPL-MCNC: 9.7 MG/DL (ref 8.4–10.2)
CAOX CRY URNS QL MICRO: ABNORMAL /HPF
CHLORIDE SERPL-SCNC: 100 MMOL/L (ref 96–108)
CHOLEST SERPL-MCNC: 240 MG/DL
CLARITY UR: CLEAR
CO2 SERPL-SCNC: 30 MMOL/L (ref 21–32)
COLOR UR: ABNORMAL
CREAT SERPL-MCNC: 0.89 MG/DL (ref 0.6–1.3)
CREAT UR-MCNC: 223.8 MG/DL
EOSINOPHIL # BLD AUTO: 0.07 THOUSAND/ÂΜL (ref 0–0.61)
EOSINOPHIL NFR BLD AUTO: 1 % (ref 0–6)
ERYTHROCYTE [DISTWIDTH] IN BLOOD BY AUTOMATED COUNT: 11.9 % (ref 11.6–15.1)
GFR SERPL CREATININE-BSD FRML MDRD: 83 ML/MIN/1.73SQ M
GLUCOSE P FAST SERPL-MCNC: 187 MG/DL (ref 65–99)
GLUCOSE UR STRIP-MCNC: ABNORMAL MG/DL
HCT VFR BLD AUTO: 41.6 % (ref 36.5–49.3)
HDLC SERPL-MCNC: 45 MG/DL
HGB BLD-MCNC: 13.6 G/DL (ref 12–17)
HGB UR QL STRIP.AUTO: NEGATIVE
IMM GRANULOCYTES # BLD AUTO: 0.02 THOUSAND/UL (ref 0–0.2)
IMM GRANULOCYTES NFR BLD AUTO: 0 % (ref 0–2)
KETONES UR STRIP-MCNC: NEGATIVE MG/DL
LDLC SERPL CALC-MCNC: 167 MG/DL (ref 0–100)
LEUKOCYTE ESTERASE UR QL STRIP: NEGATIVE
LYMPHOCYTES # BLD AUTO: 2.79 THOUSANDS/ÂΜL (ref 0.6–4.47)
LYMPHOCYTES NFR BLD AUTO: 36 % (ref 14–44)
MCH RBC QN AUTO: 30 PG (ref 26.8–34.3)
MCHC RBC AUTO-ENTMCNC: 32.7 G/DL (ref 31.4–37.4)
MCV RBC AUTO: 92 FL (ref 82–98)
MICROALBUMIN UR-MCNC: 68.5 MG/L
MICROALBUMIN/CREAT 24H UR: 31 MG/G CREATININE (ref 0–30)
MONOCYTES # BLD AUTO: 0.54 THOUSAND/ÂΜL (ref 0.17–1.22)
MONOCYTES NFR BLD AUTO: 7 % (ref 4–12)
NEUTROPHILS # BLD AUTO: 4.3 THOUSANDS/ÂΜL (ref 1.85–7.62)
NEUTS SEG NFR BLD AUTO: 55 % (ref 43–75)
NITRITE UR QL STRIP: NEGATIVE
NON-SQ EPI CELLS URNS QL MICRO: ABNORMAL /HPF
NONHDLC SERPL-MCNC: 195 MG/DL
NRBC BLD AUTO-RTO: 0 /100 WBCS
PH UR STRIP.AUTO: 5 [PH]
PLATELET # BLD AUTO: 218 THOUSANDS/UL (ref 149–390)
PMV BLD AUTO: 10.7 FL (ref 8.9–12.7)
POTASSIUM SERPL-SCNC: 4.2 MMOL/L (ref 3.5–5.3)
PROT SERPL-MCNC: 7.7 G/DL (ref 6.4–8.4)
PROT UR STRIP-MCNC: ABNORMAL MG/DL
PSA SERPL-MCNC: 0.65 NG/ML (ref 0–4)
RBC # BLD AUTO: 4.53 MILLION/UL (ref 3.88–5.62)
RBC #/AREA URNS AUTO: ABNORMAL /HPF
SODIUM SERPL-SCNC: 138 MMOL/L (ref 135–147)
SP GR UR STRIP.AUTO: 1.02 (ref 1–1.04)
TRIGL SERPL-MCNC: 139 MG/DL
TSH SERPL DL<=0.05 MIU/L-ACNC: 2.18 UIU/ML (ref 0.45–4.5)
UROBILINOGEN UA: NEGATIVE MG/DL
WBC # BLD AUTO: 7.77 THOUSAND/UL (ref 4.31–10.16)
WBC #/AREA URNS AUTO: ABNORMAL /HPF

## 2024-07-01 PROCEDURE — 80061 LIPID PANEL: CPT

## 2024-07-01 PROCEDURE — 36415 COLL VENOUS BLD VENIPUNCTURE: CPT

## 2024-07-01 PROCEDURE — 84443 ASSAY THYROID STIM HORMONE: CPT

## 2024-07-01 PROCEDURE — 81001 URINALYSIS AUTO W/SCOPE: CPT

## 2024-07-01 PROCEDURE — 85025 COMPLETE CBC W/AUTO DIFF WBC: CPT

## 2024-07-01 PROCEDURE — G0103 PSA SCREENING: HCPCS

## 2024-07-01 PROCEDURE — 82570 ASSAY OF URINE CREATININE: CPT

## 2024-07-01 PROCEDURE — 82043 UR ALBUMIN QUANTITATIVE: CPT

## 2024-07-01 PROCEDURE — 80053 COMPREHEN METABOLIC PANEL: CPT

## 2024-07-01 RX ORDER — TRAMADOL HYDROCHLORIDE 50 MG/1
50 TABLET ORAL EVERY 12 HOURS PRN
Qty: 60 TABLET | Refills: 0 | Status: SHIPPED | OUTPATIENT
Start: 2024-07-01

## 2024-08-09 DIAGNOSIS — Z79.4 TYPE 2 DIABETES MELLITUS WITH STAGE 3 CHRONIC KIDNEY DISEASE, WITH LONG-TERM CURRENT USE OF INSULIN, UNSPECIFIED WHETHER STAGE 3A OR 3B CKD (HCC): ICD-10-CM

## 2024-08-09 DIAGNOSIS — E11.22 TYPE 2 DIABETES MELLITUS WITH STAGE 3 CHRONIC KIDNEY DISEASE, WITH LONG-TERM CURRENT USE OF INSULIN, UNSPECIFIED WHETHER STAGE 3A OR 3B CKD (HCC): ICD-10-CM

## 2024-08-09 DIAGNOSIS — N18.30 TYPE 2 DIABETES MELLITUS WITH STAGE 3 CHRONIC KIDNEY DISEASE, WITH LONG-TERM CURRENT USE OF INSULIN, UNSPECIFIED WHETHER STAGE 3A OR 3B CKD (HCC): ICD-10-CM

## 2024-08-09 RX ORDER — METFORMIN HYDROCHLORIDE 750 MG/1
750 TABLET, EXTENDED RELEASE ORAL
Qty: 60 TABLET | Refills: 1 | Status: SHIPPED | OUTPATIENT
Start: 2024-08-09

## 2024-08-21 ENCOUNTER — APPOINTMENT (OUTPATIENT)
Dept: LAB | Facility: HOSPITAL | Age: 74
End: 2024-08-21
Payer: MEDICARE

## 2024-08-21 DIAGNOSIS — E55.9 VITAMIN D DEFICIENCY: ICD-10-CM

## 2024-08-21 DIAGNOSIS — Z79.4 TYPE 2 DIABETES MELLITUS WITH DIABETIC POLYNEUROPATHY, WITH LONG-TERM CURRENT USE OF INSULIN (HCC): Primary | ICD-10-CM

## 2024-08-21 DIAGNOSIS — N52.1 ERECTILE DYSFUNCTION DUE TO DISEASES CLASSIFIED ELSEWHERE: ICD-10-CM

## 2024-08-21 DIAGNOSIS — E11.42 TYPE 2 DIABETES MELLITUS WITH DIABETIC POLYNEUROPATHY, WITH LONG-TERM CURRENT USE OF INSULIN (HCC): Primary | ICD-10-CM

## 2024-08-21 LAB
25(OH)D3 SERPL-MCNC: 15.2 NG/ML (ref 30–100)
ALBUMIN SERPL BCG-MCNC: 3.7 G/DL (ref 3.5–5)
ALP SERPL-CCNC: 69 U/L (ref 34–104)
ALT SERPL W P-5'-P-CCNC: 8 U/L (ref 7–52)
ANION GAP SERPL CALCULATED.3IONS-SCNC: 8 MMOL/L (ref 4–13)
AST SERPL W P-5'-P-CCNC: 12 U/L (ref 13–39)
BILIRUB SERPL-MCNC: 0.62 MG/DL (ref 0.2–1)
BUN SERPL-MCNC: 15 MG/DL (ref 5–25)
CALCIUM SERPL-MCNC: 9.7 MG/DL (ref 8.4–10.2)
CHLORIDE SERPL-SCNC: 101 MMOL/L (ref 96–108)
CO2 SERPL-SCNC: 29 MMOL/L (ref 21–32)
CREAT SERPL-MCNC: 0.88 MG/DL (ref 0.6–1.3)
EST. AVERAGE GLUCOSE BLD GHB EST-MCNC: 315 MG/DL
GFR SERPL CREATININE-BSD FRML MDRD: 84 ML/MIN/1.73SQ M
GLUCOSE P FAST SERPL-MCNC: 309 MG/DL (ref 65–99)
HBA1C MFR BLD: 12.6 %
POTASSIUM SERPL-SCNC: 4.4 MMOL/L (ref 3.5–5.3)
PROT SERPL-MCNC: 7.5 G/DL (ref 6.4–8.4)
SODIUM SERPL-SCNC: 138 MMOL/L (ref 135–147)
TESTOST SERPL-MSCNC: 90 NG/DL

## 2024-08-21 PROCEDURE — 36415 COLL VENOUS BLD VENIPUNCTURE: CPT

## 2024-08-21 PROCEDURE — 80053 COMPREHEN METABOLIC PANEL: CPT

## 2024-08-21 PROCEDURE — 84403 ASSAY OF TOTAL TESTOSTERONE: CPT

## 2024-08-21 PROCEDURE — 82306 VITAMIN D 25 HYDROXY: CPT

## 2024-08-21 PROCEDURE — 83036 HEMOGLOBIN GLYCOSYLATED A1C: CPT

## 2024-08-30 DIAGNOSIS — Z79.4 TYPE 2 DIABETES MELLITUS WITH HYPERGLYCEMIA, WITH LONG-TERM CURRENT USE OF INSULIN (HCC): ICD-10-CM

## 2024-08-30 DIAGNOSIS — M54.41 CHRONIC MIDLINE LOW BACK PAIN WITH RIGHT-SIDED SCIATICA: ICD-10-CM

## 2024-08-30 DIAGNOSIS — E11.65 TYPE 2 DIABETES MELLITUS WITH HYPERGLYCEMIA, WITH LONG-TERM CURRENT USE OF INSULIN (HCC): ICD-10-CM

## 2024-08-30 DIAGNOSIS — G89.29 CHRONIC MIDLINE LOW BACK PAIN WITH RIGHT-SIDED SCIATICA: ICD-10-CM

## 2024-08-30 DIAGNOSIS — R06.02 SHORTNESS OF BREATH: ICD-10-CM

## 2024-08-30 RX ORDER — TRAMADOL HYDROCHLORIDE 50 MG/1
50 TABLET ORAL EVERY 12 HOURS PRN
Qty: 60 TABLET | Refills: 0 | Status: SHIPPED | OUTPATIENT
Start: 2024-08-30

## 2024-08-30 RX ORDER — INSULIN ASPART 100 [IU]/ML
INJECTION, SUSPENSION SUBCUTANEOUS
Qty: 15 ML | Status: CANCELLED | OUTPATIENT
Start: 2024-08-30

## 2024-08-30 RX ORDER — ALBUTEROL SULFATE 90 UG/1
AEROSOL, METERED RESPIRATORY (INHALATION)
Qty: 6.7 G | Refills: 5 | Status: SHIPPED | OUTPATIENT
Start: 2024-08-30

## 2024-08-30 RX ORDER — BLOOD-GLUCOSE METER
1 KIT MISCELLANEOUS 3 TIMES DAILY
Qty: 300 STRIP | Refills: 1 | Status: SHIPPED | OUTPATIENT
Start: 2024-08-30

## 2024-09-13 ENCOUNTER — RA CDI HCC (OUTPATIENT)
Dept: OTHER | Facility: HOSPITAL | Age: 74
End: 2024-09-13

## 2024-09-13 NOTE — PROGRESS NOTES
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

## 2024-09-17 ENCOUNTER — OFFICE VISIT (OUTPATIENT)
Dept: FAMILY MEDICINE CLINIC | Facility: CLINIC | Age: 74
End: 2024-09-17
Payer: MEDICARE

## 2024-09-17 VITALS
OXYGEN SATURATION: 98 % | HEIGHT: 67 IN | BODY MASS INDEX: 25.27 KG/M2 | WEIGHT: 161 LBS | HEART RATE: 65 BPM | DIASTOLIC BLOOD PRESSURE: 76 MMHG | RESPIRATION RATE: 16 BRPM | TEMPERATURE: 97.8 F | SYSTOLIC BLOOD PRESSURE: 139 MMHG

## 2024-09-17 DIAGNOSIS — E11.42 TYPE 2 DIABETES MELLITUS WITH DIABETIC POLYNEUROPATHY, WITH LONG-TERM CURRENT USE OF INSULIN (HCC): Primary | ICD-10-CM

## 2024-09-17 DIAGNOSIS — G89.29 CHRONIC MIDLINE LOW BACK PAIN WITH RIGHT-SIDED SCIATICA: ICD-10-CM

## 2024-09-17 DIAGNOSIS — E78.2 MIXED HYPERLIPIDEMIA: ICD-10-CM

## 2024-09-17 DIAGNOSIS — N18.2 CKD (CHRONIC KIDNEY DISEASE) STAGE 2, GFR 60-89 ML/MIN: ICD-10-CM

## 2024-09-17 DIAGNOSIS — M54.41 CHRONIC MIDLINE LOW BACK PAIN WITH RIGHT-SIDED SCIATICA: ICD-10-CM

## 2024-09-17 DIAGNOSIS — E11.65 TYPE 2 DIABETES MELLITUS WITH HYPERGLYCEMIA, WITH LONG-TERM CURRENT USE OF INSULIN (HCC): ICD-10-CM

## 2024-09-17 DIAGNOSIS — E11.22 TYPE 2 DIABETES MELLITUS WITH STAGE 3 CHRONIC KIDNEY DISEASE, WITH LONG-TERM CURRENT USE OF INSULIN, UNSPECIFIED WHETHER STAGE 3A OR 3B CKD (HCC): ICD-10-CM

## 2024-09-17 DIAGNOSIS — Z79.4 TYPE 2 DIABETES MELLITUS WITH STAGE 3 CHRONIC KIDNEY DISEASE, WITH LONG-TERM CURRENT USE OF INSULIN, UNSPECIFIED WHETHER STAGE 3A OR 3B CKD (HCC): ICD-10-CM

## 2024-09-17 DIAGNOSIS — N18.30 TYPE 2 DIABETES MELLITUS WITH STAGE 3 CHRONIC KIDNEY DISEASE, WITH LONG-TERM CURRENT USE OF INSULIN, UNSPECIFIED WHETHER STAGE 3A OR 3B CKD (HCC): ICD-10-CM

## 2024-09-17 DIAGNOSIS — Z79.4 TYPE 2 DIABETES MELLITUS WITH DIABETIC POLYNEUROPATHY, WITH LONG-TERM CURRENT USE OF INSULIN (HCC): Primary | ICD-10-CM

## 2024-09-17 DIAGNOSIS — I10 ESSENTIAL HYPERTENSION: ICD-10-CM

## 2024-09-17 DIAGNOSIS — Z79.4 TYPE 2 DIABETES MELLITUS WITH HYPERGLYCEMIA, WITH LONG-TERM CURRENT USE OF INSULIN (HCC): ICD-10-CM

## 2024-09-17 PROCEDURE — 99214 OFFICE O/P EST MOD 30 MIN: CPT | Performed by: INTERNAL MEDICINE

## 2024-09-17 PROCEDURE — G2211 COMPLEX E/M VISIT ADD ON: HCPCS | Performed by: INTERNAL MEDICINE

## 2024-09-17 RX ORDER — CLONIDINE HYDROCHLORIDE 0.2 MG/1
0.2 TABLET ORAL 2 TIMES DAILY
COMMUNITY
Start: 2024-08-07 | End: 2025-08-07

## 2024-09-17 RX ORDER — TIRZEPATIDE 5 MG/.5ML
5 INJECTION, SOLUTION SUBCUTANEOUS
COMMUNITY
Start: 2024-08-07

## 2024-09-17 NOTE — ASSESSMENT & PLAN NOTE
Continue follow-up with endocrinology team.  His diabetes is still not very well-controlled.  He was recently switched to Mounjaro, tolerated well.  Continue current dose of insulin, Jardiance.  Lab Results   Component Value Date    HGBA1C 12.6 (H) 08/21/2024

## 2024-09-17 NOTE — ASSESSMENT & PLAN NOTE
His LDL was highly elevated, he is on maximum dose of atorvastatin and Zetia.  He will benefit from PCSK9 inhibitors.  Will start him on Repatha.  Side effects discussed.  Check his lipid panel in 3 months.

## 2024-09-17 NOTE — PROGRESS NOTES
Assessment/Plan:    Essential hypertension  Blood pressures are overall acceptable.  Continue current meds.    Type 2 diabetes mellitus with stage 3 chronic kidney disease, with long-term current use of insulin, unspecified whether stage 3a or 3b CKD (HCC)  Continue follow-up with endocrinology team.  His diabetes is still not very well-controlled.  He was recently switched to Mounjaro, tolerated well.  Continue current dose of insulin, Jardiance.  Lab Results   Component Value Date    HGBA1C 12.6 (H) 08/21/2024       CKD (chronic kidney disease) stage 2, GFR 60-89 ml/min  Lab Results   Component Value Date    EGFR 84 08/21/2024    EGFR 83 07/01/2024    EGFR 76 05/24/2023    CREATININE 0.88 08/21/2024    CREATININE 0.89 07/01/2024    CREATININE 0.97 05/24/2023   stable.    Mixed hyperlipidemia  His LDL was highly elevated, he is on maximum dose of atorvastatin and Zetia.  He will benefit from PCSK9 inhibitors.  Will start him on Repatha.  Side effects discussed.  Check his lipid panel in 3 months.    Chronic midline low back pain with right-sided sciatica  Doing okay on current dose of tramadol, denies any side effects.  Follow-up in 3 months.       Diagnoses and all orders for this visit:    Type 2 diabetes mellitus with diabetic polyneuropathy, with long-term current use of insulin (HCC)    Type 2 diabetes mellitus with hyperglycemia, with long-term current use of insulin ([facility])    Mixed hyperlipidemia  -     Evolocumab 140 MG/ML SOAJ; Inject 1 mL (140 mg total) under the skin every 14 (fourteen) days    Essential hypertension    Type 2 diabetes mellitus with stage 3 chronic kidney disease, with long-term current use of insulin, unspecified whether stage 3a or 3b CKD (HCC)    CKD (chronic kidney disease) stage 2, GFR 60-89 ml/min    Chronic midline low back pain with right-sided sciatica    Other orders  -     Mounjaro 5 MG/0.5ML; Inject 5 mg under the skin every 7 days  -     cloNIDine (CATAPRES) 0.2 mg tablet;  "Take 0.2 mg by mouth 2 (two) times a day          Subjective:      Patient ID: Wagner Ochoa is a 73 y.o. male.    Patient came today for follow-up on his chronic medical problems.        The following portions of the patient's history were reviewed and updated as appropriate: allergies, current medications, past family history, past medical history, past social history, past surgical history, and problem list.    Review of Systems   Constitutional:  Negative for activity change, chills, fatigue and fever.   HENT:  Negative for congestion, ear pain, sinus pressure and sore throat.    Eyes:  Negative for pain and visual disturbance.   Respiratory:  Negative for cough, chest tightness, shortness of breath and wheezing.    Cardiovascular:  Negative for chest pain, palpitations and leg swelling.   Gastrointestinal:  Negative for abdominal pain, blood in stool, constipation, diarrhea, nausea and vomiting.   Endocrine: Negative for polydipsia and polyuria.   Genitourinary:  Negative for difficulty urinating, dysuria, frequency and urgency.   Musculoskeletal:  Positive for arthralgias and back pain. Negative for joint swelling and myalgias.   Skin:  Negative for rash.   Neurological:  Negative for dizziness, weakness, numbness and headaches.   Hematological:  Negative for adenopathy. Does not bruise/bleed easily.   Psychiatric/Behavioral:  Negative for dysphoric mood. The patient is not nervous/anxious.          Objective:      /76 (BP Location: Left arm, Patient Position: Sitting, Cuff Size: Standard)   Pulse 65   Temp 97.8 °F (36.6 °C) (Temporal)   Resp 16   Ht 5' 7\" (1.702 m)   Wt 73 kg (161 lb)   SpO2 98%   BMI 25.22 kg/m²     Allergies   Allergen Reactions    Iodinated Contrast Media Other (See Comments)          Current Outpatient Medications:     albuterol (PROVENTIL HFA,VENTOLIN HFA) 90 mcg/act inhaler, INHALE 2 PUFFS EVERY 6 HOURS AS NEEDED FOR WHEEZING Strength: 108 (90 Base) MCG/ACT, Disp: 6.7 " g, Rfl: 5    amLODIPine (NORVASC) 10 mg tablet, TAKE ONE TABLET BY MOUTH ONCE DAILY, Disp: 90 tablet, Rfl: 1    ammonium lactate (LAC-HYDRIN) 12 % cream, APPLY TOPICALLY TO AFFECTED AREA ONCE DAILY AS NEEDED FOR DRY SKIN, Disp: 385 g, Rfl: 0    aspirin (ECOTRIN LOW STRENGTH) 81 mg EC tablet, Take 81 mg by mouth daily, Disp: , Rfl:     Aspirin Low Dose 81 MG EC tablet, TAKE ONE TABLET BY MOUTH DAILY, Disp: 30 tablet, Rfl: 11    atorvastatin (LIPITOR) 80 mg tablet, TAKE ONE TABLET BY MOUTH ONCE DAILY WITH DINNER, Disp: 90 tablet, Rfl: 1    cloNIDine (CATAPRES) 0.2 mg tablet, Take 0.2 mg by mouth 2 (two) times a day, Disp: , Rfl:     Continuous Blood Gluc Sensor (FreeStyle Lucas 14 Day Sensor) MISC, , Disp: , Rfl:     Continuous Blood Gluc Sensor (FreeStyle Lucas 14 Day Sensor) MISC, Use 1 each every 14 (fourteen) days, Disp: , Rfl:     EASY COMFORT PEN NEEDLES 32G X 4 MM MISC, , Disp: , Rfl:     ergocalciferol (VITAMIN D2) 50,000 units, Take 1 capsule weekly x8 weeks, then 2000 units p.o. Daily., Disp: 8 capsule, Rfl: 1    Evolocumab 140 MG/ML SOAJ, Inject 1 mL (140 mg total) under the skin every 14 (fourteen) days, Disp: 2 mL, Rfl: 5    ezetimibe (ZETIA) 10 mg tablet, TAKE ONE TABLET BY MOUTH ONCE DAILY, Disp: 90 tablet, Rfl: 1    gabapentin (NEURONTIN) 300 mg capsule, TAKE ONE CAPSULE BY MOUTH THREE TIMES A DAY . START WITH ONCE DAILY , INCREASE TO TWICE A DAY FOR 7 DAYS , THEN CONTINUE THREE TIMES A DAY, Disp: 180 capsule, Rfl: 2    glucose blood (FREESTYLE LITE) test strip, Use 1 each 3 (three) times a day Use as instructed, Disp: 300 strip, Rfl: 1    ibuprofen (MOTRIN) 800 mg tablet, , Disp: , Rfl:     insulin aspart protamine-insulin aspart (NovoLOG Mix 70/30 FlexPen) 100 Units/mL injection pen, Inject 40 units subcutaneously twice a day before meals, Disp: , Rfl:     Insulin Pen Needle 32G X 4 MM MISC, Use with flexpens BID, Disp: , Rfl:     losartan (COZAAR) 100 MG tablet, Take 1 tablet (100 mg total) by mouth  daily, Disp: 90 tablet, Rfl: 2    metFORMIN (GLUCOPHAGE-XR) 750 mg 24 hr tablet, TAKE ONE TABLET BY MOUTH ONCE DAILY WITH BREAKFAST, Disp: 60 tablet, Rfl: 1    Mounjaro 5 MG/0.5ML, Inject 5 mg under the skin every 7 days, Disp: , Rfl:     Multiple Vitamins-Minerals (Vitramyn) TABS, TAKE ONE TABLET BY MOUTH ONCE DAILY, Disp: 90 tablet, Rfl: 3    Narcan 4 MG/0.1ML nasal spray, USE ONE SPRAY IN ONE NOSTRIL FOR ONE DOSE. IF THE DESIRED RESPONSE IS NOT OBTAINED AFTER 2-3 MINUTES, ADMINISTER ADDITIONAL DOSE IN OPPOSITE NOSTRIL USING NEW SPRAY, Disp: 2 each, Rfl: 0    pantoprazole (PROTONIX) 40 mg tablet, Take 1 tablet (40 mg total) by mouth daily, Disp: 90 tablet, Rfl: 2    SURE COMFORT LANCETS 30G MISC, use as directed, Disp: , Rfl:     tadalafil (CIALIS) 10 MG tablet, Take 10 mg by mouth, Disp: , Rfl:     traMADol (ULTRAM) 50 mg tablet, Take 1 tablet (50 mg total) by mouth every 12 (twelve) hours as needed for moderate pain, Disp: 60 tablet, Rfl: 0    triamcinolone (KENALOG) 0.1 % cream, APPLY TOPICALLY TO AFFECTED AREA TWICE A DAY AS NEEDED FOR RASH, Disp: 30 g, Rfl: 0    UltiCare Alcohol Swabs 70 % PADS, USE TO CLEAN THE AREAS BEFORE TESTING BLOOD SUGAR OR/AND INJECTING INSULIN ONCE DAILY, Disp: 100 each, Rfl: 6    carbamide peroxide (DEBROX) 6.5 % otic solution, Administer 5 drops into ears 2 (two) times a day for 4 days, Disp: 15 mL, Rfl: 0    Empagliflozin 25 MG TABS, Take 1 tablet (25 mg total) by mouth in the morning, Disp: 90 tablet, Rfl: 2    terazosin (HYTRIN) 10 MG capsule, Take 10 mg by mouth, Disp: , Rfl:      There are no Patient Instructions on file for this visit.        Physical Exam  Constitutional:       Appearance: He is not ill-appearing.   HENT:      Head: Normocephalic and atraumatic.      Nose: No congestion or rhinorrhea.   Eyes:      Pupils: Pupils are equal, round, and reactive to light.   Cardiovascular:      Rate and Rhythm: Normal rate and regular rhythm.      Pulses: Normal pulses.       Heart sounds: Normal heart sounds. No murmur heard.     No friction rub. No gallop.   Pulmonary:      Effort: Pulmonary effort is normal. No respiratory distress.      Breath sounds: Normal breath sounds. No wheezing or rales.   Chest:      Chest wall: No tenderness.   Abdominal:      General: Bowel sounds are normal. There is no distension.      Palpations: Abdomen is soft. There is no mass.      Tenderness: There is no abdominal tenderness. There is no rebound.      Hernia: No hernia is present.   Musculoskeletal:         General: No swelling, tenderness or deformity.      Cervical back: No rigidity. No muscular tenderness.   Skin:     Coloration: Skin is not pale.      Findings: No erythema, lesion or rash.   Neurological:      Mental Status: He is alert and oriented to person, place, and time.      Sensory: No sensory deficit.      Motor: No weakness.   Psychiatric:         Mood and Affect: Mood normal.         Behavior: Behavior normal.

## 2024-09-17 NOTE — PROGRESS NOTES
Diabetic Foot Exam    Patient's shoes and socks removed.    Right Foot/Ankle   Right Foot Inspection  Skin Exam: skin normal, skin intact and dry skin. No warmth, no callus, no erythema, no maceration, no abnormal color, no pre-ulcer, no ulcer and no callus.     Toe Exam: ROM and strength within normal limits and swelling.     Sensory   Monofilament testing: intact    Vascular  Capillary refills: < 3 seconds  The right DP pulse is 1+. The right PT pulse is 1+.     Left Foot/Ankle  Left Foot Inspection  Skin Exam: skin normal, skin intact and dry skin. No warmth, no erythema, no maceration, normal color, no pre-ulcer, no ulcer and no callus.     Toe Exam: ROM and strength within normal limits and swelling.     Sensory   Monofilament testing: intact    Vascular  Capillary refills: < 3 seconds  The left DP pulse is 1+. The left PT pulse is 1+.     Assign Risk Category  Deformity present  No loss of protective sensation  No weak pulses  Risk: 0

## 2024-09-17 NOTE — ASSESSMENT & PLAN NOTE
Lab Results   Component Value Date    EGFR 84 08/21/2024    EGFR 83 07/01/2024    EGFR 76 05/24/2023    CREATININE 0.88 08/21/2024    CREATININE 0.89 07/01/2024    CREATININE 0.97 05/24/2023   stable.

## 2024-09-18 ENCOUNTER — TELEPHONE (OUTPATIENT)
Dept: FAMILY MEDICINE CLINIC | Facility: CLINIC | Age: 74
End: 2024-09-18

## 2024-10-17 DIAGNOSIS — Z79.4 TYPE 2 DIABETES MELLITUS WITH HYPERGLYCEMIA, WITH LONG-TERM CURRENT USE OF INSULIN (HCC): ICD-10-CM

## 2024-10-17 DIAGNOSIS — E11.65 TYPE 2 DIABETES MELLITUS WITH HYPERGLYCEMIA, WITH LONG-TERM CURRENT USE OF INSULIN (HCC): ICD-10-CM

## 2024-10-17 DIAGNOSIS — M54.41 CHRONIC MIDLINE LOW BACK PAIN WITH RIGHT-SIDED SCIATICA: ICD-10-CM

## 2024-10-17 DIAGNOSIS — G89.29 CHRONIC MIDLINE LOW BACK PAIN WITH RIGHT-SIDED SCIATICA: ICD-10-CM

## 2024-10-18 RX ORDER — TRAMADOL HYDROCHLORIDE 50 MG/1
50 TABLET ORAL EVERY 12 HOURS PRN
Qty: 60 TABLET | Refills: 0 | Status: SHIPPED | OUTPATIENT
Start: 2024-10-18

## 2024-10-18 RX ORDER — BLOOD-GLUCOSE METER
1 KIT MISCELLANEOUS 3 TIMES DAILY
Qty: 300 STRIP | Refills: 1 | Status: SHIPPED | OUTPATIENT
Start: 2024-10-18

## 2024-11-07 DIAGNOSIS — G62.9 PERIPHERAL POLYNEUROPATHY: ICD-10-CM

## 2024-11-07 DIAGNOSIS — E11.22 TYPE 2 DIABETES MELLITUS WITH STAGE 3 CHRONIC KIDNEY DISEASE, WITH LONG-TERM CURRENT USE OF INSULIN, UNSPECIFIED WHETHER STAGE 3A OR 3B CKD (HCC): ICD-10-CM

## 2024-11-07 DIAGNOSIS — N18.30 TYPE 2 DIABETES MELLITUS WITH STAGE 3 CHRONIC KIDNEY DISEASE, WITH LONG-TERM CURRENT USE OF INSULIN, UNSPECIFIED WHETHER STAGE 3A OR 3B CKD (HCC): ICD-10-CM

## 2024-11-07 DIAGNOSIS — Z79.4 TYPE 2 DIABETES MELLITUS WITH STAGE 3 CHRONIC KIDNEY DISEASE, WITH LONG-TERM CURRENT USE OF INSULIN, UNSPECIFIED WHETHER STAGE 3A OR 3B CKD (HCC): ICD-10-CM

## 2024-11-08 RX ORDER — METFORMIN HYDROCHLORIDE 750 MG/1
750 TABLET, EXTENDED RELEASE ORAL
Qty: 90 TABLET | Refills: 1 | Status: SHIPPED | OUTPATIENT
Start: 2024-11-08

## 2024-11-08 RX ORDER — GABAPENTIN 300 MG/1
CAPSULE ORAL
Qty: 180 CAPSULE | Refills: 1 | Status: SHIPPED | OUTPATIENT
Start: 2024-11-08

## 2024-12-10 ENCOUNTER — RA CDI HCC (OUTPATIENT)
Dept: OTHER | Facility: HOSPITAL | Age: 74
End: 2024-12-10

## 2024-12-10 NOTE — PROGRESS NOTES
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

## 2024-12-17 ENCOUNTER — OFFICE VISIT (OUTPATIENT)
Dept: FAMILY MEDICINE CLINIC | Facility: CLINIC | Age: 74
End: 2024-12-17
Payer: MEDICARE

## 2024-12-17 VITALS
DIASTOLIC BLOOD PRESSURE: 68 MMHG | OXYGEN SATURATION: 98 % | HEIGHT: 67 IN | SYSTOLIC BLOOD PRESSURE: 118 MMHG | RESPIRATION RATE: 16 BRPM | HEART RATE: 75 BPM | BODY MASS INDEX: 26.02 KG/M2 | TEMPERATURE: 97.5 F | WEIGHT: 165.8 LBS

## 2024-12-17 DIAGNOSIS — N18.30 TYPE 2 DIABETES MELLITUS WITH STAGE 3 CHRONIC KIDNEY DISEASE, WITH LONG-TERM CURRENT USE OF INSULIN, UNSPECIFIED WHETHER STAGE 3A OR 3B CKD (HCC): ICD-10-CM

## 2024-12-17 DIAGNOSIS — E78.2 MIXED HYPERLIPIDEMIA: ICD-10-CM

## 2024-12-17 DIAGNOSIS — Z79.4 TYPE 2 DIABETES MELLITUS WITH STAGE 3 CHRONIC KIDNEY DISEASE, WITH LONG-TERM CURRENT USE OF INSULIN, UNSPECIFIED WHETHER STAGE 3A OR 3B CKD (HCC): ICD-10-CM

## 2024-12-17 DIAGNOSIS — E11.22 TYPE 2 DIABETES MELLITUS WITH STAGE 3 CHRONIC KIDNEY DISEASE, WITH LONG-TERM CURRENT USE OF INSULIN, UNSPECIFIED WHETHER STAGE 3A OR 3B CKD (HCC): ICD-10-CM

## 2024-12-17 DIAGNOSIS — M54.41 CHRONIC MIDLINE LOW BACK PAIN WITH RIGHT-SIDED SCIATICA: ICD-10-CM

## 2024-12-17 DIAGNOSIS — Z79.4 TYPE 2 DIABETES MELLITUS WITH HYPERGLYCEMIA, WITH LONG-TERM CURRENT USE OF INSULIN (HCC): Primary | ICD-10-CM

## 2024-12-17 DIAGNOSIS — G89.29 CHRONIC MIDLINE LOW BACK PAIN WITH RIGHT-SIDED SCIATICA: ICD-10-CM

## 2024-12-17 DIAGNOSIS — E11.65 TYPE 2 DIABETES MELLITUS WITH HYPERGLYCEMIA, WITH LONG-TERM CURRENT USE OF INSULIN (HCC): Primary | ICD-10-CM

## 2024-12-17 DIAGNOSIS — I10 ESSENTIAL HYPERTENSION: ICD-10-CM

## 2024-12-17 LAB
LEFT EYE DIABETIC RETINOPATHY: ABNORMAL
LEFT EYE IMAGE QUALITY: ABNORMAL
LEFT EYE MACULAR EDEMA: ABNORMAL
LEFT EYE OTHER RETINOPATHY: ABNORMAL
RIGHT EYE DIABETIC RETINOPATHY: ABNORMAL
RIGHT EYE IMAGE QUALITY: ABNORMAL
RIGHT EYE MACULAR EDEMA: ABNORMAL
RIGHT EYE OTHER RETINOPATHY: ABNORMAL
SEVERITY (EYE EXAM): ABNORMAL
SL AMB POCT HEMOGLOBIN AIC: 11.4 (ref ?–6.5)

## 2024-12-17 PROCEDURE — 99214 OFFICE O/P EST MOD 30 MIN: CPT | Performed by: INTERNAL MEDICINE

## 2024-12-17 PROCEDURE — 83036 HEMOGLOBIN GLYCOSYLATED A1C: CPT | Performed by: INTERNAL MEDICINE

## 2024-12-17 RX ORDER — TIRZEPATIDE 7.5 MG/.5ML
7.5 INJECTION, SOLUTION SUBCUTANEOUS WEEKLY
Qty: 2 ML | Refills: 0 | Status: SHIPPED | OUTPATIENT
Start: 2024-12-17

## 2024-12-17 RX ORDER — TRAMADOL HYDROCHLORIDE 50 MG/1
50 TABLET ORAL EVERY 12 HOURS PRN
Qty: 60 TABLET | Refills: 0 | Status: SHIPPED | OUTPATIENT
Start: 2024-12-17

## 2024-12-17 NOTE — ASSESSMENT & PLAN NOTE
Hemoglobin A1c is slowly improving.  Will increase dose of his Mounjaro up to 7.5 mg weekly, follow-up in 1 month.  Continue current dose of NovoLog, he takes 40 units twice a day.  Continue metformin 750 mg daily.  Lab Results   Component Value Date    HGBA1C 11.4 (A) 12/17/2024

## 2024-12-17 NOTE — ASSESSMENT & PLAN NOTE
He is on atorvastatin and Zetia, his cholesterol level was still highly elevated.  Will start him on Repatha to give him more benefit due to his uncontrolled hyperlipidemia associated with type 2 diabetes.  Side effects discussed.  Prescription sent to the pharmacy.

## 2024-12-17 NOTE — PROGRESS NOTES
Assessment/Plan:    Essential hypertension  Blood pressures are very well-controlled on current meds.  Continue the same.    Type 2 diabetes mellitus with stage 3 chronic kidney disease, with long-term current use of insulin, unspecified whether stage 3a or 3b CKD (HCC)  Hemoglobin A1c is slowly improving.  Will increase dose of his Mounjaro up to 7.5 mg weekly, follow-up in 1 month.  Continue current dose of NovoLog, he takes 40 units twice a day.  Continue metformin 750 mg daily.  Lab Results   Component Value Date    HGBA1C 11.4 (A) 12/17/2024       Chronic midline low back pain with right-sided sciatica  He is doing really well on current dose of tramadol, denies any side effects.    Mixed hyperlipidemia  He is on atorvastatin and Zetia, his cholesterol level was still highly elevated.  Will start him on Repatha to give him more benefit due to his uncontrolled hyperlipidemia associated with type 2 diabetes.  Side effects discussed.  Prescription sent to the pharmacy.       Diagnoses and all orders for this visit:    Type 2 diabetes mellitus with hyperglycemia, with long-term current use of insulin (HCC)  -     IRIS Diabetic eye exam  -     POCT hemoglobin A1c  -     Tirzepatide (Mounjaro) 7.5 MG/0.5ML SOAJ; Inject 7.5 mg under the skin once a week    Mixed hyperlipidemia  -     Evolocumab 140 MG/ML SOAJ; Inject 1 mL (140 mg total) under the skin every 14 (fourteen) days    Chronic midline low back pain with right-sided sciatica  -     traMADol (ULTRAM) 50 mg tablet; Take 1 tablet (50 mg total) by mouth every 12 (twelve) hours as needed for moderate pain    Essential hypertension    Type 2 diabetes mellitus with stage 3 chronic kidney disease, with long-term current use of insulin, unspecified whether stage 3a or 3b CKD (HCC)          Subjective:      Patient ID: Wagner Ochoa is a 74 y.o. male.    Patient came today for follow-up on his chronic medical problems.        The following portions of the patient's  "history were reviewed and updated as appropriate: allergies, current medications, past family history, past medical history, past social history, past surgical history, and problem list.    Review of Systems   Constitutional:  Negative for chills and fever.   Respiratory:  Negative for cough, shortness of breath and wheezing.    Cardiovascular:  Negative for chest pain, palpitations and leg swelling.   Gastrointestinal:  Negative for abdominal distention, abdominal pain and anal bleeding.   Skin:  Negative for color change.         Objective:      /68 (BP Location: Left arm, Patient Position: Sitting, Cuff Size: Large)   Pulse 75   Temp 97.5 °F (36.4 °C) (Temporal)   Resp 16   Ht 5' 7\" (1.702 m)   Wt 75.2 kg (165 lb 12.8 oz)   SpO2 98%   BMI 25.97 kg/m²     Allergies   Allergen Reactions    Iodinated Contrast Media Other (See Comments)          Current Outpatient Medications:     albuterol (PROVENTIL HFA,VENTOLIN HFA) 90 mcg/act inhaler, INHALE 2 PUFFS EVERY 6 HOURS AS NEEDED FOR WHEEZING Strength: 108 (90 Base) MCG/ACT, Disp: 6.7 g, Rfl: 5    amLODIPine (NORVASC) 10 mg tablet, TAKE ONE TABLET BY MOUTH ONCE DAILY, Disp: 90 tablet, Rfl: 1    ammonium lactate (LAC-HYDRIN) 12 % cream, APPLY TOPICALLY TO AFFECTED AREA ONCE DAILY AS NEEDED FOR DRY SKIN, Disp: 385 g, Rfl: 0    aspirin (ECOTRIN LOW STRENGTH) 81 mg EC tablet, Take 81 mg by mouth daily, Disp: , Rfl:     Aspirin Low Dose 81 MG EC tablet, TAKE ONE TABLET BY MOUTH DAILY, Disp: 30 tablet, Rfl: 11    atorvastatin (LIPITOR) 80 mg tablet, TAKE ONE TABLET BY MOUTH ONCE DAILY WITH DINNER, Disp: 90 tablet, Rfl: 1    cloNIDine (CATAPRES) 0.2 mg tablet, Take 0.2 mg by mouth 2 (two) times a day, Disp: , Rfl:     Continuous Blood Gluc Sensor (FreeStyle Lucas 14 Day Sensor) MISC, , Disp: , Rfl:     Continuous Blood Gluc Sensor (FreeStyle Lucas 14 Day Sensor) MISC, Use 1 each every 14 (fourteen) days, Disp: , Rfl:     EASY COMFORT PEN NEEDLES 32G X 4 MM MISC, , " Disp: , Rfl:     Empagliflozin 25 MG TABS, Take 1 tablet (25 mg total) by mouth in the morning, Disp: 90 tablet, Rfl: 2    ergocalciferol (VITAMIN D2) 50,000 units, Take 1 capsule weekly x8 weeks, then 2000 units p.o. Daily., Disp: 8 capsule, Rfl: 1    Evolocumab 140 MG/ML SOAJ, Inject 1 mL (140 mg total) under the skin every 14 (fourteen) days, Disp: 2 mL, Rfl: 5    ezetimibe (ZETIA) 10 mg tablet, TAKE ONE TABLET BY MOUTH ONCE DAILY, Disp: 90 tablet, Rfl: 1    gabapentin (NEURONTIN) 300 mg capsule, TAKE ONE CAPSULE BY MOUTH THREE TIMES A DAY .START WITH ONCE DAILY .INCREASE TO TWICE A DAY FOR 7 DAY .THEN CONTINUE THREE TIMES A DAY, Disp: 180 capsule, Rfl: 1    glucose blood (FREESTYLE LITE) test strip, Use 1 each 3 (three) times a day Use as instructed, Disp: 300 strip, Rfl: 1    ibuprofen (MOTRIN) 800 mg tablet, , Disp: , Rfl:     insulin aspart protamine-insulin aspart (NovoLOG Mix 70/30 FlexPen) 100 Units/mL injection pen, Inject 40 units subcutaneously twice a day before meals, Disp: , Rfl:     Insulin Pen Needle 32G X 4 MM MISC, Use with flexpens BID, Disp: , Rfl:     losartan (COZAAR) 100 MG tablet, Take 1 tablet (100 mg total) by mouth daily, Disp: 90 tablet, Rfl: 2    metFORMIN (GLUCOPHAGE-XR) 750 mg 24 hr tablet, TAKE ONE TABLET BY MOUTH ONCE DAILY WITH BREAKFAST, Disp: 90 tablet, Rfl: 1    Multiple Vitamins-Minerals (Vitramyn) TABS, TAKE ONE TABLET BY MOUTH ONCE DAILY, Disp: 90 tablet, Rfl: 3    Narcan 4 MG/0.1ML nasal spray, USE ONE SPRAY IN ONE NOSTRIL FOR ONE DOSE. IF THE DESIRED RESPONSE IS NOT OBTAINED AFTER 2-3 MINUTES, ADMINISTER ADDITIONAL DOSE IN OPPOSITE NOSTRIL USING NEW SPRAY, Disp: 2 each, Rfl: 0    pantoprazole (PROTONIX) 40 mg tablet, Take 1 tablet (40 mg total) by mouth daily, Disp: 90 tablet, Rfl: 2    SURE COMFORT LANCETS 30G MISC, use as directed, Disp: , Rfl:     tadalafil (CIALIS) 10 MG tablet, Take 10 mg by mouth, Disp: , Rfl:     Tirzepatide (Mounjaro) 7.5 MG/0.5ML SOAJ, Inject 7.5  mg under the skin once a week, Disp: 2 mL, Rfl: 0    traMADol (ULTRAM) 50 mg tablet, Take 1 tablet (50 mg total) by mouth every 12 (twelve) hours as needed for moderate pain, Disp: 60 tablet, Rfl: 0    triamcinolone (KENALOG) 0.1 % cream, APPLY TOPICALLY TO AFFECTED AREA TWICE A DAY AS NEEDED FOR RASH, Disp: 30 g, Rfl: 0    UltiCare Alcohol Swabs 70 % PADS, USE TO CLEAN THE AREAS BEFORE TESTING BLOOD SUGAR OR/AND INJECTING INSULIN ONCE DAILY, Disp: 100 each, Rfl: 6    carbamide peroxide (DEBROX) 6.5 % otic solution, Administer 5 drops into ears 2 (two) times a day for 4 days, Disp: 15 mL, Rfl: 0    terazosin (HYTRIN) 10 MG capsule, Take 10 mg by mouth, Disp: , Rfl:      There are no Patient Instructions on file for this visit.        Physical Exam  Constitutional:       General: He is not in acute distress.     Appearance: He is not ill-appearing or toxic-appearing.   Cardiovascular:      Heart sounds: No murmur heard.     No gallop.   Pulmonary:      Effort: No respiratory distress.      Breath sounds: No wheezing or rales.

## 2024-12-20 ENCOUNTER — TELEPHONE (OUTPATIENT)
Age: 74
End: 2024-12-20

## 2024-12-20 NOTE — TELEPHONE ENCOUNTER
Reason for call:   [] Prior Auth  [] Other:     Caller:  [] Patient  [x] Pharmacy  Name:   Address:   Callback Number:     Medication: evolocumab 140 MG/L    Dose/Frequency: inject 1ml     Quantity: 2 mL    Ordering Provider:   [x] PCP/Provider -   [] Speciality/Provider -     Has the patient tried other medications and failed? If failed, which medications did they fail?    [] No   [] Yes -     Is the patient's insurance updated in EPIC?   [x] Yes   [] No     Is a copy of the patient's insurance scanned in EPIC?   [x] Yes   [] No

## 2024-12-20 NOTE — TELEPHONE ENCOUNTER
PA for Repatha 140MG/ML SUBMITTED to [employer]    via    [x]CMM-KEY: [id number]  []Surescripts-Case ID #   []Availity-Auth ID # NDC #   []Faxed to plan   []Other website   []Phone call Case ID #     []PA sent as URGENT    All office notes, labs and other pertaining documents and studies sent. Clinical questions answered. Awaiting determination from insurance company.     Turnaround time for your insurance to make a decision on your Prior Authorization can take 7-21 business days.

## 2024-12-23 NOTE — TELEPHONE ENCOUNTER
PA for Evolocumab 140 MG/ML DENIED    Reason:(Screenshot if applicable)        Message sent to office clinical pool Yes    Denial letter scanned into Media Yes    Appeal started No (Provider will need to decide if appeal is warranted and send clinical documentation to Prior Authorization Team for initiation.)    **Please follow up with your patient regarding denial and next steps**

## 2024-12-30 DIAGNOSIS — E11.65 TYPE 2 DIABETES MELLITUS WITH HYPERGLYCEMIA, WITH LONG-TERM CURRENT USE OF INSULIN (HCC): ICD-10-CM

## 2024-12-30 DIAGNOSIS — Z79.4 TYPE 2 DIABETES MELLITUS WITH HYPERGLYCEMIA, WITH LONG-TERM CURRENT USE OF INSULIN (HCC): ICD-10-CM

## 2024-12-31 RX ORDER — TIRZEPATIDE 7.5 MG/.5ML
INJECTION, SOLUTION SUBCUTANEOUS
Qty: 2 ML | Refills: 0 | Status: SHIPPED | OUTPATIENT
Start: 2024-12-31 | End: 2025-01-10

## 2025-01-07 ENCOUNTER — RA CDI HCC (OUTPATIENT)
Dept: OTHER | Facility: HOSPITAL | Age: 75
End: 2025-01-07

## 2025-01-09 DIAGNOSIS — E78.2 MIXED HYPERLIPIDEMIA: ICD-10-CM

## 2025-01-09 DIAGNOSIS — E11.65 TYPE 2 DIABETES MELLITUS WITH HYPERGLYCEMIA, WITH LONG-TERM CURRENT USE OF INSULIN (HCC): ICD-10-CM

## 2025-01-09 DIAGNOSIS — Z79.4 TYPE 2 DIABETES MELLITUS WITH HYPERGLYCEMIA, WITH LONG-TERM CURRENT USE OF INSULIN (HCC): ICD-10-CM

## 2025-01-10 RX ORDER — EZETIMIBE 10 MG/1
10 TABLET ORAL DAILY
Qty: 90 TABLET | Refills: 1 | Status: SHIPPED | OUTPATIENT
Start: 2025-01-10

## 2025-01-10 RX ORDER — TIRZEPATIDE 7.5 MG/.5ML
INJECTION, SOLUTION SUBCUTANEOUS
Qty: 2 ML | Refills: 0 | Status: SHIPPED | OUTPATIENT
Start: 2025-01-10 | End: 2025-01-15

## 2025-01-15 ENCOUNTER — OFFICE VISIT (OUTPATIENT)
Dept: FAMILY MEDICINE CLINIC | Facility: CLINIC | Age: 75
End: 2025-01-15
Payer: MEDICARE

## 2025-01-15 VITALS
DIASTOLIC BLOOD PRESSURE: 89 MMHG | BODY MASS INDEX: 26.06 KG/M2 | HEIGHT: 67 IN | SYSTOLIC BLOOD PRESSURE: 138 MMHG | WEIGHT: 166 LBS

## 2025-01-15 DIAGNOSIS — E11.65 TYPE 2 DIABETES MELLITUS WITH HYPERGLYCEMIA, WITH LONG-TERM CURRENT USE OF INSULIN (HCC): Primary | ICD-10-CM

## 2025-01-15 DIAGNOSIS — F11.20 CONTINUOUS OPIOID DEPENDENCE (HCC): ICD-10-CM

## 2025-01-15 DIAGNOSIS — Z79.4 TYPE 2 DIABETES MELLITUS WITH STAGE 3 CHRONIC KIDNEY DISEASE, WITH LONG-TERM CURRENT USE OF INSULIN, UNSPECIFIED WHETHER STAGE 3A OR 3B CKD (HCC): ICD-10-CM

## 2025-01-15 DIAGNOSIS — M54.41 CHRONIC MIDLINE LOW BACK PAIN WITH RIGHT-SIDED SCIATICA: ICD-10-CM

## 2025-01-15 DIAGNOSIS — N18.30 TYPE 2 DIABETES MELLITUS WITH STAGE 3 CHRONIC KIDNEY DISEASE, WITH LONG-TERM CURRENT USE OF INSULIN, UNSPECIFIED WHETHER STAGE 3A OR 3B CKD (HCC): ICD-10-CM

## 2025-01-15 DIAGNOSIS — G89.29 CHRONIC MIDLINE LOW BACK PAIN WITH RIGHT-SIDED SCIATICA: ICD-10-CM

## 2025-01-15 DIAGNOSIS — E78.2 MIXED HYPERLIPIDEMIA: ICD-10-CM

## 2025-01-15 DIAGNOSIS — I73.9 PVD (PERIPHERAL VASCULAR DISEASE) (HCC): ICD-10-CM

## 2025-01-15 DIAGNOSIS — I73.9 CLAUDICATION (HCC): ICD-10-CM

## 2025-01-15 DIAGNOSIS — I10 ESSENTIAL HYPERTENSION: ICD-10-CM

## 2025-01-15 DIAGNOSIS — Z79.4 TYPE 2 DIABETES MELLITUS WITH HYPERGLYCEMIA, WITH LONG-TERM CURRENT USE OF INSULIN (HCC): Primary | ICD-10-CM

## 2025-01-15 DIAGNOSIS — F11.20 OPIOID DEPENDENCE, UNCOMPLICATED (HCC): ICD-10-CM

## 2025-01-15 DIAGNOSIS — E11.22 TYPE 2 DIABETES MELLITUS WITH STAGE 3 CHRONIC KIDNEY DISEASE, WITH LONG-TERM CURRENT USE OF INSULIN, UNSPECIFIED WHETHER STAGE 3A OR 3B CKD (HCC): ICD-10-CM

## 2025-01-15 PROCEDURE — 99214 OFFICE O/P EST MOD 30 MIN: CPT | Performed by: INTERNAL MEDICINE

## 2025-01-15 RX ORDER — TRAMADOL HYDROCHLORIDE 50 MG/1
50 TABLET ORAL EVERY 12 HOURS PRN
Qty: 60 TABLET | Refills: 0 | Status: SHIPPED | OUTPATIENT
Start: 2025-01-15

## 2025-01-15 RX ORDER — TIRZEPATIDE 10 MG/.5ML
10 INJECTION, SOLUTION SUBCUTANEOUS WEEKLY
Qty: 2 ML | Refills: 1 | Status: SHIPPED | OUTPATIENT
Start: 2025-01-15

## 2025-01-15 NOTE — ASSESSMENT & PLAN NOTE
Patient reports symptoms consistent with claudication, reports coldness of the right lower extremity.  Pulses are decreased.  Will send for arterial Doppler of the lower extremities to get more information on that.    Orders:    VAS ARTERIAL DUPLEX- LOWER LIMB BILATERAL; Future

## 2025-01-15 NOTE — PROGRESS NOTES
Name: Wagner Ochoa      : 1950      MRN: [id number]  Encounter Provider: Ricardo Presley MD  Encounter Date: 1/15/2025   Encounter department: [facility]    Assessment & Plan  Type 2 diabetes mellitus with hyperglycemia, with long-term current use of insulin (HCC)    Lab Results   Component Value Date    HGBA1C 11.4 (A) 2024       Orders:    Tirzepatide (Mounjaro) 10 MG/0.5ML SOAJ; Inject 10 mg under the skin once a week    Continuous opioid dependence (HCC)    Orders:    [facility] All Prescribed Meds and Special Instructions    Amphetamines, Methamphetamines    Butalbital    Phenobarbital    Secobarbital    Alprazolam    Clonazepam    Diazepam    Lorazepam    Gabapentin    Pregabalin    Cocaine    Heroin    Buprenorphine    Levorphanol    Meperidine    Naltrexone    Fentanyl    Methadone    Oxycodone    Tapentadol    THC    Tramadol    Codeine, Hydrocodone, Hydropmorphone, Morphine    Bath Salts    Ethyl Glucuronide/Ethyl Sulfate    Kratom    Spice    Methylphenidate    Phentermine    Validity Oxidant    Validity Creatinine    Validity pH    Validity Specific    Xylazine Definitive Test    Type 2 diabetes mellitus with stage 3 chronic kidney disease, with long-term current use of insulin, unspecified whether stage 3a or 3b CKD (HCC)  Continue to taper up Mounjaro.  Continue current dose of NovoLog, metformin, Jardiance.  Recheck hemoglobin A1c with the next blood work.  Lab Results   Component Value Date    HGBA1C 11.4 (A) 2024            Opioid dependence, uncomplicated (HCC)         PVD (peripheral vascular disease) (HCC)         Claudication (HCC)  Patient reports symptoms consistent with claudication, reports coldness of the right lower extremity.  Pulses are decreased.  Will send for arterial Doppler of the lower extremities to get more information on that.    Orders:    VAS ARTERIAL DUPLEX- LOWER LIMB BILATERAL; Future    Chronic midline low  back pain with right-sided sciatica  He is doing good on current dose of tramadol, denies any side effects.  Urine drug screen was collected today.    Orders:    traMADol (ULTRAM) 50 mg tablet; Take 1 tablet (50 mg total) by mouth every 12 (twelve) hours as needed for moderate pain    Essential hypertension  Blood pressure is very well-controlled on current meds.  Continue the same.         Mixed hyperlipidemia  He is LDL is way above range, he is currently on maximum dose of atorvastatin and Zetia.  Repatha was not covered by his insurance.              History of Present Illness     Patient came today for follow-up on his chronic medical problems and with a new concern of pain in his muscles with walking and reporting that his right lower extremity feels cold.  He noticed that few months ago and it seems like it is stable.      Review of Systems   Constitutional:  Negative for chills and fever.   Respiratory:  Negative for cough, shortness of breath and wheezing.    Cardiovascular:  Negative for chest pain, palpitations and leg swelling.   Gastrointestinal:  Negative for abdominal distention, abdominal pain and anal bleeding.   Musculoskeletal:  Positive for gait problem and myalgias.   Skin:  Negative for color change.     Past Medical History:   Diagnosis Date    Diabetes mellitus (HCC)      Past Surgical History:   Procedure Laterality Date    APPENDECTOMY Right 1970    PARAMEDIAN INCISION     History reviewed. No pertinent family history.  Social History     Tobacco Use    Smoking status: Former     Types: Cigarettes    Smokeless tobacco: Former    Tobacco comments:     Quit 2001   Vaping Use    Vaping status: Never Used   Substance and Sexual Activity    Alcohol use: Never    Drug use: Never    Sexual activity: Not on file     Current Outpatient Medications on File Prior to Visit   Medication Sig    albuterol (PROVENTIL HFA,VENTOLIN HFA) 90 mcg/act inhaler INHALE 2 PUFFS EVERY 6 HOURS AS NEEDED FOR WHEEZING  Strength: 108 (90 Base) MCG/ACT    amLODIPine (NORVASC) 10 mg tablet TAKE ONE TABLET BY MOUTH ONCE DAILY    ammonium lactate (LAC-HYDRIN) 12 % cream APPLY TOPICALLY TO AFFECTED AREA ONCE DAILY AS NEEDED FOR DRY SKIN    aspirin (ECOTRIN LOW STRENGTH) 81 mg EC tablet Take 81 mg by mouth daily    Aspirin Low Dose 81 MG EC tablet TAKE ONE TABLET BY MOUTH DAILY    atorvastatin (LIPITOR) 80 mg tablet TAKE ONE TABLET BY MOUTH ONCE DAILY WITH DINNER    carbamide peroxide (DEBROX) 6.5 % otic solution Administer 5 drops into ears 2 (two) times a day for 4 days    cloNIDine (CATAPRES) 0.2 mg tablet Take 0.2 mg by mouth 2 (two) times a day    Continuous Blood Gluc Sensor (FreeStyle Lucas 14 Day Sensor) MISC     Continuous Blood Gluc Sensor (FreeStyle Lucas 14 Day Sensor) MISC Use 1 each every 14 (fourteen) days    EASY COMFORT PEN NEEDLES 32G X 4 MM MISC     Empagliflozin 25 MG TABS Take 1 tablet (25 mg total) by mouth in the morning    ergocalciferol (VITAMIN D2) 50,000 units Take 1 capsule weekly x8 weeks, then 2000 units p.o. Daily.    Evolocumab 140 MG/ML SOAJ Inject 1 mL (140 mg total) under the skin every 14 (fourteen) days    ezetimibe (ZETIA) 10 mg tablet TAKE ONE TABLET BY MOUTH ONCE DAILY    gabapentin (NEURONTIN) 300 mg capsule TAKE ONE CAPSULE BY MOUTH THREE TIMES A DAY .START WITH ONCE DAILY .INCREASE TO TWICE A DAY FOR 7 DAY .THEN CONTINUE THREE TIMES A DAY    glucose blood (FREESTYLE LITE) test strip Use 1 each 3 (three) times a day Use as instructed    ibuprofen (MOTRIN) 800 mg tablet     insulin aspart protamine-insulin aspart (NovoLOG Mix 70/30 FlexPen) 100 Units/mL injection pen Inject 40 units subcutaneously twice a day before meals    Insulin Pen Needle 32G X 4 MM MISC Use with flexpens BID    losartan (COZAAR) 100 MG tablet Take 1 tablet (100 mg total) by mouth daily    metFORMIN (GLUCOPHAGE-XR) 750 mg 24 hr tablet TAKE ONE TABLET BY MOUTH ONCE DAILY WITH BREAKFAST    Multiple Vitamins-Minerals (Vitramyn)  "TABS TAKE ONE TABLET BY MOUTH ONCE DAILY    Narcan 4 MG/0.1ML nasal spray USE ONE SPRAY IN ONE NOSTRIL FOR ONE DOSE. IF THE DESIRED RESPONSE IS NOT OBTAINED AFTER 2-3 MINUTES, ADMINISTER ADDITIONAL DOSE IN OPPOSITE NOSTRIL USING NEW SPRAY    pantoprazole (PROTONIX) 40 mg tablet Take 1 tablet (40 mg total) by mouth daily    SURE COMFORT LANCETS 30G MISC use as directed    tadalafil (CIALIS) 10 MG tablet Take 10 mg by mouth    terazosin (HYTRIN) 10 MG capsule Take 10 mg by mouth    triamcinolone (KENALOG) 0.1 % cream APPLY TOPICALLY TO AFFECTED AREA TWICE A DAY AS NEEDED FOR RASH    UltiCare Alcohol Swabs 70 % PADS USE TO CLEAN THE AREAS BEFORE TESTING BLOOD SUGAR OR/AND INJECTING INSULIN ONCE DAILY    [DISCONTINUED] Mounjaro 7.5 MG/0.5ML SOAJ INJECT 7.5 MG SUBCUTANEOUSLY ONCE EVERY WEEK    [DISCONTINUED] traMADol (ULTRAM) 50 mg tablet Take 1 tablet (50 mg total) by mouth every 12 (twelve) hours as needed for moderate pain     Allergies   Allergen Reactions    Iodinated Contrast Media Other (See Comments)     Immunization History   Administered Date(s) Administered    COVID-19 J&J ([employer]) vaccine 0.5 mL 04/09/2021    INFLUENZA 10/23/2008, 10/12/2009, 11/12/2010, 11/02/2011, 09/19/2012, 11/15/2013, 10/29/2014, 11/02/2015, 10/20/2016    Influenza, high dose seasonal 0.7 mL 02/04/2019, 11/25/2019, 01/22/2021, 10/19/2021, 10/21/2022, 12/13/2023    Pneumococcal Conjugate 13-Valent 10/20/2016    Pneumococcal Polysaccharide PPV23 02/20/2009    Tdap 02/20/2009, 01/22/2021    Zoster Vaccine Recombinant 11/19/2022     Objective   /89   Ht 5' 7\" (1.702 m)   Wt 75.3 kg (166 lb)   BMI 26.00 kg/m²     Physical Exam  Constitutional:       General: He is not in acute distress.     Appearance: He is not ill-appearing or toxic-appearing.   Cardiovascular:      Heart sounds: No murmur heard.     Comments: Dorsalis pedis 1+ bilaterally.  Pulmonary:      Effort: No respiratory distress.      Breath sounds: No wheezing or rales. "

## 2025-01-15 NOTE — ASSESSMENT & PLAN NOTE
He is doing good on current dose of tramadol, denies any side effects.  Urine drug screen was collected today.    Orders:    traMADol (ULTRAM) 50 mg tablet; Take 1 tablet (50 mg total) by mouth every 12 (twelve) hours as needed for moderate pain

## 2025-01-15 NOTE — ASSESSMENT & PLAN NOTE
Lab Results   Component Value Date    HGBA1C 11.4 (A) 12/17/2024       Orders:    Tirzepatide (Mounjaro) 10 MG/0.5ML SOAJ; Inject 10 mg under the skin once a week

## 2025-01-15 NOTE — ASSESSMENT & PLAN NOTE
Continue to taper up Mounjaro.  Continue current dose of NovoLog, metformin, Jardiance.  Recheck hemoglobin A1c with the next blood work.  Lab Results   Component Value Date    HGBA1C 11.4 (A) 12/17/2024

## 2025-01-15 NOTE — ASSESSMENT & PLAN NOTE
He is LDL is way above range, he is currently on maximum dose of atorvastatin and Zetia.  Repatha was not covered by his insurance.

## 2025-01-16 RX ORDER — BLOOD SUGAR DIAGNOSTIC
STRIP MISCELLANEOUS
Qty: 300 EACH | Refills: 3 | Status: SHIPPED | OUTPATIENT
Start: 2025-01-16

## 2025-01-16 RX ORDER — BLOOD-GLUCOSE METER
KIT MISCELLANEOUS
Qty: 1 KIT | Refills: 0 | Status: SHIPPED | OUTPATIENT
Start: 2025-01-16

## 2025-01-16 RX ORDER — LANCETS 33 GAUGE
EACH MISCELLANEOUS
Qty: 300 EACH | Refills: 3 | Status: SHIPPED | OUTPATIENT
Start: 2025-01-16

## 2025-01-17 DIAGNOSIS — E11.65 TYPE 2 DIABETES MELLITUS WITH HYPERGLYCEMIA, WITH LONG-TERM CURRENT USE OF INSULIN (HCC): ICD-10-CM

## 2025-01-17 DIAGNOSIS — Z79.4 TYPE 2 DIABETES MELLITUS WITH HYPERGLYCEMIA, WITH LONG-TERM CURRENT USE OF INSULIN (HCC): ICD-10-CM

## 2025-01-17 RX ORDER — ALCOHOL ANTISEPTIC PADS
PADS, MEDICATED (EA) TOPICAL
Refills: 5 | OUTPATIENT
Start: 2025-01-17

## 2025-01-19 LAB
4OH-XYLAZINE UR QL CFM: NEGATIVE NG/ML
6MAM UR QL CFM: NEGATIVE NG/ML
7AMINOCLONAZEPAM UR QL CFM: NEGATIVE NG/ML
A-OH ALPRAZ UR QL CFM: NEGATIVE NG/ML
ACCEPTABLE CREAT UR QL: NORMAL MG/DL
ACCEPTIBLE SP GR UR QL: NORMAL
AMPHET UR QL CFM: NEGATIVE NG/ML
BUPRENORPHINE UR QL CFM: NEGATIVE NG/ML
BUTALBITAL UR QL CFM: NEGATIVE NG/ML
BZE UR QL CFM: NEGATIVE NG/ML
CODEINE UR QL CFM: NEGATIVE NG/ML
EDDP UR QL CFM: NEGATIVE NG/ML
ETHYL GLUCURONIDE UR QL CFM: NEGATIVE NG/ML
ETHYL SULFATE UR QL SCN: NEGATIVE NG/ML
EUTYLONE UR QL: NEGATIVE NG/ML
FENTANYL UR QL CFM: NEGATIVE NG/ML
GLIADIN IGG SER IA-ACNC: NEGATIVE NG/ML
HYDROCODONE UR QL CFM: NEGATIVE NG/ML
HYDROMORPHONE UR QL CFM: NEGATIVE NG/ML
LORAZEPAM UR QL CFM: NEGATIVE NG/ML
ME-PHENIDATE UR QL CFM: NEGATIVE NG/ML
MEPERIDINE UR QL CFM: NEGATIVE NG/ML
METHADONE UR QL CFM: NEGATIVE NG/ML
METHAMPHET UR QL CFM: NEGATIVE NG/ML
MORPHINE UR QL CFM: NEGATIVE NG/ML
NALTREXONE UR QL CFM: NEGATIVE NG/ML
NITRITE UR QL: NORMAL UG/ML
NORBUPRENORPHINE UR QL CFM: NEGATIVE NG/ML
NORDIAZEPAM UR QL CFM: NEGATIVE NG/ML
NORFENTANYL UR QL CFM: NEGATIVE NG/ML
NORHYDROCODONE UR QL CFM: NEGATIVE NG/ML
NORMEPERIDINE UR QL CFM: NEGATIVE NG/ML
NOROXYCODONE UR QL CFM: NEGATIVE NG/ML
OXAZEPAM UR QL CFM: NEGATIVE NG/ML
OXYCODONE UR QL CFM: NEGATIVE NG/ML
OXYMORPHONE UR QL CFM: NEGATIVE NG/ML
PARA-FLUOROFENTANYL QUANTIFICATION: NORMAL NG/ML
PHENOBARB UR QL CFM: NEGATIVE NG/ML
RESULT ALL_PRESCRIBED MEDS AND SPECIAL INSTRUCTIONS: NORMAL
SECOBARBITAL UR QL CFM: NEGATIVE NG/ML
SL AMB 5F-ADB-M7 METABOLITE QUANTIFICATION: NEGATIVE NG/ML
SL AMB 7-OH-MITRAGYNINE (KRATOM ALKALOID) QUANTIFICATION: NEGATIVE NG/ML
SL AMB AB-FUBINACA-M3 METABOLITE QUANTIFICATION: NEGATIVE NG/ML
SL AMB ACETYL FENTANYL QUANTIFICATION: NORMAL NG/ML
SL AMB ACETYL NORFENTANYL QUANTIFICATION: NORMAL NG/ML
SL AMB ACRYL FENTANYL QUANTIFICATION: NORMAL NG/ML
SL AMB CARFENTANIL QUANTIFICATION: NORMAL NG/ML
SL AMB CTHC (MARIJUANA METABOLITE) QUANTIFICATION: NEGATIVE NG/ML
SL AMB DEXTRORPHAN (DEXTROMETHORPHAN METABOLITE) QUANT: NEGATIVE NG/ML
SL AMB GABAPENTIN QUANTIFICATION: NORMAL NG/ML
SL AMB JWH018 METABOLITE QUANTIFICATION: NEGATIVE NG/ML
SL AMB JWH073 METABOLITE QUANTIFICATION: NEGATIVE NG/ML
SL AMB MDMB-FUBINACA-M1 METABOLITE QUANTIFICATION: NEGATIVE NG/ML
SL AMB METHYLONE QUANTIFICATION: NEGATIVE NG/ML
SL AMB N-DESMETHYL-TRAMADOL QUANTIFICATION: NORMAL NG/ML
SL AMB PHENTERMINE QUANTIFICATION: NEGATIVE NG/ML
SL AMB PREGABALIN QUANTIFICATION: NEGATIVE NG/ML
SL AMB RCS4 METABOLITE QUANTIFICATION: NEGATIVE NG/ML
SL AMB RITALINIC ACID QUANTIFICATION: NEGATIVE NG/ML
SMOOTH MUSCLE AB TITR SER IF: NEGATIVE NG/ML
SPECIMEN DRAWN SERPL: NEGATIVE NG/ML
SPECIMEN PH ACCEPTABLE UR: NORMAL
TAPENTADOL UR QL CFM: NEGATIVE NG/ML
TEMAZEPAM UR QL CFM: NEGATIVE NG/ML
TRAMADOL UR QL CFM: NORMAL NG/ML
URATE/CREAT 24H UR: NORMAL NG/ML
XYLAZINE UR QL CFM: NEGATIVE NG/ML

## 2025-01-20 DIAGNOSIS — Z79.4 TYPE 2 DIABETES MELLITUS WITH HYPERGLYCEMIA, WITH LONG-TERM CURRENT USE OF INSULIN (HCC): ICD-10-CM

## 2025-01-20 DIAGNOSIS — E78.2 MIXED HYPERLIPIDEMIA: ICD-10-CM

## 2025-01-20 DIAGNOSIS — E11.65 TYPE 2 DIABETES MELLITUS WITH HYPERGLYCEMIA, WITH LONG-TERM CURRENT USE OF INSULIN (HCC): ICD-10-CM

## 2025-01-21 RX ORDER — TIRZEPATIDE 7.5 MG/.5ML
INJECTION, SOLUTION SUBCUTANEOUS
Qty: 2 ML | Refills: 0 | OUTPATIENT
Start: 2025-01-21

## 2025-01-21 RX ORDER — EZETIMIBE 10 MG/1
10 TABLET ORAL DAILY
Qty: 90 TABLET | Refills: 2 | OUTPATIENT
Start: 2025-01-21

## 2025-01-23 ENCOUNTER — HOSPITAL ENCOUNTER (OUTPATIENT)
Dept: NON INVASIVE DIAGNOSTICS | Facility: HOSPITAL | Age: 75
Discharge: HOME | End: 2025-01-23
Payer: MEDICARE

## 2025-01-23 DIAGNOSIS — I73.9 CLAUDICATION (HCC): ICD-10-CM

## 2025-01-23 PROCEDURE — 93922 UPR/L XTREMITY ART 2 LEVELS: CPT | Performed by: SURGERY

## 2025-01-23 PROCEDURE — 93923 UPR/LXTR ART STDY 3+ LVLS: CPT

## 2025-01-23 PROCEDURE — 93925 LOWER EXTREMITY STUDY: CPT | Performed by: SURGERY

## 2025-01-23 PROCEDURE — 93925 LOWER EXTREMITY STUDY: CPT

## 2025-01-30 ENCOUNTER — RESULTS FOLLOW-UP (OUTPATIENT)
Dept: FAMILY MEDICINE CLINIC | Facility: CLINIC | Age: 75
End: 2025-01-30

## 2025-01-30 NOTE — TELEPHONE ENCOUNTER
----- Message from Ricardo Presley MD sent at 1/30/2025 10:16 AM EST -----  Urine drug screen looks consistent with medications that you take.

## 2025-01-31 DIAGNOSIS — I73.9 CLAUDICATION (HCC): ICD-10-CM

## 2025-01-31 DIAGNOSIS — I73.9 PVD (PERIPHERAL VASCULAR DISEASE) (HCC): Primary | ICD-10-CM

## 2025-02-07 ENCOUNTER — HOSPITAL ENCOUNTER (EMERGENCY)
Facility: HOSPITAL | Age: 75
DRG: 322 | End: 2025-02-07
Attending: EMERGENCY MEDICINE
Payer: MEDICARE

## 2025-02-07 ENCOUNTER — HOSPITAL ENCOUNTER (INPATIENT)
Facility: HOSPITAL | Age: 75
LOS: 4 days | Discharge: HOME | DRG: 322 | End: 2025-02-11
Attending: INTERNAL MEDICINE | Admitting: STUDENT IN AN ORGANIZED HEALTH CARE EDUCATION/TRAINING PROGRAM
Payer: MEDICARE

## 2025-02-07 ENCOUNTER — APPOINTMENT (EMERGENCY)
Dept: RADIOLOGY | Facility: HOSPITAL | Age: 75
DRG: 322 | End: 2025-02-07
Payer: MEDICARE

## 2025-02-07 VITALS
TEMPERATURE: 97.6 F | SYSTOLIC BLOOD PRESSURE: 125 MMHG | DIASTOLIC BLOOD PRESSURE: 67 MMHG | HEART RATE: 62 BPM | RESPIRATION RATE: 19 BRPM | OXYGEN SATURATION: 98 % | WEIGHT: 166.45 LBS | BODY MASS INDEX: 26.07 KG/M2

## 2025-02-07 DIAGNOSIS — I21.9 MYOCARDIAL INFARCTION (HCC): Primary | ICD-10-CM

## 2025-02-07 DIAGNOSIS — I21.3 STEMI (ST ELEVATION MYOCARDIAL INFARCTION) (HCC): Primary | ICD-10-CM

## 2025-02-07 PROBLEM — R07.9 CHEST PAIN: Status: ACTIVE | Noted: 2025-02-07

## 2025-02-07 LAB
ALBUMIN SERPL BCG-MCNC: 4.1 G/DL (ref 3.5–5)
ALP SERPL-CCNC: 69 U/L (ref 34–104)
ALT SERPL W P-5'-P-CCNC: 34 U/L (ref 7–52)
ANION GAP SERPL CALCULATED.3IONS-SCNC: 6 MMOL/L (ref 4–13)
APTT PPP: 25 SECONDS (ref 23–34)
APTT PPP: 60 SECONDS (ref 23–34)
APTT PPP: 69 SECONDS (ref 23–34)
AST SERPL W P-5'-P-CCNC: 220 U/L (ref 13–39)
ATRIAL RATE: 66 BPM
ATRIAL RATE: 67 BPM
BASOPHILS # BLD AUTO: 0.04 THOUSANDS/ΜL (ref 0–0.1)
BASOPHILS NFR BLD AUTO: 1 % (ref 0–1)
BILIRUB SERPL-MCNC: 0.43 MG/DL (ref 0.2–1)
BNP SERPL-MCNC: 258 PG/ML (ref 0–100)
BUN SERPL-MCNC: 18 MG/DL (ref 5–25)
CALCIUM SERPL-MCNC: 10.3 MG/DL (ref 8.4–10.2)
CARDIAC TROPONIN I PNL SERPL HS: ABNORMAL NG/L (ref ?–50)
CHLORIDE SERPL-SCNC: 99 MMOL/L (ref 96–108)
CO2 SERPL-SCNC: 32 MMOL/L (ref 21–32)
CREAT SERPL-MCNC: 0.94 MG/DL (ref 0.6–1.3)
EOSINOPHIL # BLD AUTO: 0.1 THOUSAND/ΜL (ref 0–0.61)
EOSINOPHIL NFR BLD AUTO: 1 % (ref 0–6)
ERYTHROCYTE [DISTWIDTH] IN BLOOD BY AUTOMATED COUNT: 12.1 % (ref 11.6–15.1)
FLUAV AG UPPER RESP QL IA.RAPID: NEGATIVE
FLUBV AG UPPER RESP QL IA.RAPID: NEGATIVE
GFR SERPL CREATININE-BSD FRML MDRD: 78 ML/MIN/1.73SQ M
GLUCOSE SERPL-MCNC: 198 MG/DL (ref 65–140)
GLUCOSE SERPL-MCNC: 206 MG/DL (ref 65–140)
GLUCOSE SERPL-MCNC: 222 MG/DL (ref 65–140)
HCT VFR BLD AUTO: 42.7 % (ref 36.5–49.3)
HGB BLD-MCNC: 13.9 G/DL (ref 12–17)
IMM GRANULOCYTES # BLD AUTO: 0.02 THOUSAND/UL (ref 0–0.2)
IMM GRANULOCYTES NFR BLD AUTO: 0 % (ref 0–2)
INR PPP: 0.96 (ref 0.85–1.19)
LYMPHOCYTES # BLD AUTO: 3.03 THOUSANDS/ΜL (ref 0.6–4.47)
LYMPHOCYTES NFR BLD AUTO: 39 % (ref 14–44)
MCH RBC QN AUTO: 29.9 PG (ref 26.8–34.3)
MCHC RBC AUTO-ENTMCNC: 32.6 G/DL (ref 31.4–37.4)
MCV RBC AUTO: 92 FL (ref 82–98)
MONOCYTES # BLD AUTO: 0.71 THOUSAND/ΜL (ref 0.17–1.22)
MONOCYTES NFR BLD AUTO: 9 % (ref 4–12)
NEUTROPHILS # BLD AUTO: 3.82 THOUSANDS/ΜL (ref 1.85–7.62)
NEUTS SEG NFR BLD AUTO: 50 % (ref 43–75)
NRBC BLD AUTO-RTO: 0 /100 WBCS
P AXIS: 22 DEGREES
P AXIS: 26 DEGREES
PLATELET # BLD AUTO: 168 THOUSANDS/UL (ref 149–390)
PMV BLD AUTO: 10.3 FL (ref 8.9–12.7)
POTASSIUM SERPL-SCNC: 5.2 MMOL/L (ref 3.5–5.3)
PR INTERVAL: 158 MS
PR INTERVAL: 162 MS
PROT SERPL-MCNC: 8.1 G/DL (ref 6.4–8.4)
PROTHROMBIN TIME: 13.1 SECONDS (ref 12.3–15)
QRS AXIS: -26 DEGREES
QRS AXIS: -27 DEGREES
QRSD INTERVAL: 68 MS
QRSD INTERVAL: 70 MS
QT INTERVAL: 376 MS
QT INTERVAL: 404 MS
QTC INTERVAL: 397 MS
QTC INTERVAL: 424 MS
RBC # BLD AUTO: 4.65 MILLION/UL (ref 3.88–5.62)
SARS-COV+SARS-COV-2 AG RESP QL IA.RAPID: NEGATIVE
SODIUM SERPL-SCNC: 137 MMOL/L (ref 135–147)
T WAVE AXIS: 72 DEGREES
T WAVE AXIS: 98 DEGREES
VENTRICULAR RATE: 66 BPM
VENTRICULAR RATE: 67 BPM
WBC # BLD AUTO: 7.72 THOUSAND/UL (ref 4.31–10.16)

## 2025-02-07 PROCEDURE — 93005 ELECTROCARDIOGRAM TRACING: CPT

## 2025-02-07 PROCEDURE — 85025 COMPLETE CBC W/AUTO DIFF WBC: CPT | Performed by: PHYSICIAN ASSISTANT

## 2025-02-07 PROCEDURE — 36415 COLL VENOUS BLD VENIPUNCTURE: CPT | Performed by: PHYSICIAN ASSISTANT

## 2025-02-07 PROCEDURE — 71045 X-RAY EXAM CHEST 1 VIEW: CPT

## 2025-02-07 PROCEDURE — 85730 THROMBOPLASTIN TIME PARTIAL: CPT | Performed by: PHYSICIAN ASSISTANT

## 2025-02-07 PROCEDURE — 82948 REAGENT STRIP/BLOOD GLUCOSE: CPT

## 2025-02-07 PROCEDURE — 87811 SARS-COV-2 COVID19 W/OPTIC: CPT | Performed by: PHYSICIAN ASSISTANT

## 2025-02-07 PROCEDURE — 84484 ASSAY OF TROPONIN QUANT: CPT | Performed by: PHYSICIAN ASSISTANT

## 2025-02-07 PROCEDURE — 87804 INFLUENZA ASSAY W/OPTIC: CPT | Performed by: PHYSICIAN ASSISTANT

## 2025-02-07 PROCEDURE — 99285 EMERGENCY DEPT VISIT HI MDM: CPT | Performed by: PHYSICIAN ASSISTANT

## 2025-02-07 PROCEDURE — 83880 ASSAY OF NATRIURETIC PEPTIDE: CPT | Performed by: PHYSICIAN ASSISTANT

## 2025-02-07 PROCEDURE — 99223 1ST HOSP IP/OBS HIGH 75: CPT | Performed by: STUDENT IN AN ORGANIZED HEALTH CARE EDUCATION/TRAINING PROGRAM

## 2025-02-07 PROCEDURE — 80053 COMPREHEN METABOLIC PANEL: CPT | Performed by: PHYSICIAN ASSISTANT

## 2025-02-07 PROCEDURE — 85610 PROTHROMBIN TIME: CPT | Performed by: PHYSICIAN ASSISTANT

## 2025-02-07 PROCEDURE — 93010 ELECTROCARDIOGRAM REPORT: CPT | Performed by: STUDENT IN AN ORGANIZED HEALTH CARE EDUCATION/TRAINING PROGRAM

## 2025-02-07 PROCEDURE — 99223 1ST HOSP IP/OBS HIGH 75: CPT | Performed by: INTERNAL MEDICINE

## 2025-02-07 PROCEDURE — 85730 THROMBOPLASTIN TIME PARTIAL: CPT | Performed by: INTERNAL MEDICINE

## 2025-02-07 RX ORDER — AMLODIPINE BESYLATE 10 MG/1
10 TABLET ORAL DAILY
Status: DISCONTINUED | OUTPATIENT
Start: 2025-02-08 | End: 2025-02-11

## 2025-02-07 RX ORDER — ALBUTEROL SULFATE 90 UG/1
2 INHALANT RESPIRATORY (INHALATION) EVERY 4 HOURS PRN
Status: DISCONTINUED | OUTPATIENT
Start: 2025-02-07 | End: 2025-02-11 | Stop reason: HOSPADM

## 2025-02-07 RX ORDER — ASPIRIN 81 MG/1
243 TABLET, CHEWABLE ORAL ONCE
Status: COMPLETED | OUTPATIENT
Start: 2025-02-07 | End: 2025-02-07

## 2025-02-07 RX ORDER — EZETIMIBE 10 MG/1
10 TABLET ORAL DAILY
Status: DISCONTINUED | OUTPATIENT
Start: 2025-02-08 | End: 2025-02-11 | Stop reason: HOSPADM

## 2025-02-07 RX ORDER — HEPARIN SODIUM 1000 [USP'U]/ML
4000 INJECTION, SOLUTION INTRAVENOUS; SUBCUTANEOUS ONCE
Status: COMPLETED | OUTPATIENT
Start: 2025-02-07 | End: 2025-02-07

## 2025-02-07 RX ORDER — HEPARIN SODIUM 10000 [USP'U]/100ML
3-20 INJECTION, SOLUTION INTRAVENOUS
Status: DISCONTINUED | OUTPATIENT
Start: 2025-02-07 | End: 2025-02-10

## 2025-02-07 RX ORDER — INSULIN ASPART 100 [IU]/ML
25 INJECTION, SUSPENSION SUBCUTANEOUS
Status: DISCONTINUED | OUTPATIENT
Start: 2025-02-08 | End: 2025-02-09

## 2025-02-07 RX ORDER — ASPIRIN 81 MG/1
81 TABLET ORAL DAILY
Status: DISCONTINUED | OUTPATIENT
Start: 2025-02-08 | End: 2025-02-11 | Stop reason: HOSPADM

## 2025-02-07 RX ORDER — INSULIN LISPRO 100 [IU]/ML
1-6 INJECTION, SOLUTION INTRAVENOUS; SUBCUTANEOUS
Status: DISCONTINUED | OUTPATIENT
Start: 2025-02-08 | End: 2025-02-11 | Stop reason: HOSPADM

## 2025-02-07 RX ORDER — PANTOPRAZOLE SODIUM 40 MG/1
40 TABLET, DELAYED RELEASE ORAL DAILY
Status: DISCONTINUED | OUTPATIENT
Start: 2025-02-08 | End: 2025-02-11 | Stop reason: HOSPADM

## 2025-02-07 RX ORDER — HEPARIN SODIUM 1000 [USP'U]/ML
2000 INJECTION, SOLUTION INTRAVENOUS; SUBCUTANEOUS EVERY 6 HOURS PRN
Status: DISCONTINUED | OUTPATIENT
Start: 2025-02-07 | End: 2025-02-10

## 2025-02-07 RX ORDER — TRAMADOL HYDROCHLORIDE 50 MG/1
50 TABLET ORAL EVERY 12 HOURS PRN
Status: DISCONTINUED | OUTPATIENT
Start: 2025-02-07 | End: 2025-02-11 | Stop reason: HOSPADM

## 2025-02-07 RX ORDER — INSULIN LISPRO 100 [IU]/ML
1-6 INJECTION, SOLUTION INTRAVENOUS; SUBCUTANEOUS
Status: DISCONTINUED | OUTPATIENT
Start: 2025-02-07 | End: 2025-02-11 | Stop reason: HOSPADM

## 2025-02-07 RX ORDER — HEPARIN SODIUM 1000 [USP'U]/ML
4000 INJECTION, SOLUTION INTRAVENOUS; SUBCUTANEOUS EVERY 6 HOURS PRN
Status: DISCONTINUED | OUTPATIENT
Start: 2025-02-07 | End: 2025-02-10

## 2025-02-07 RX ORDER — CLONIDINE HYDROCHLORIDE 0.2 MG/1
0.2 TABLET ORAL 2 TIMES DAILY
Status: DISCONTINUED | OUTPATIENT
Start: 2025-02-07 | End: 2025-02-09

## 2025-02-07 RX ORDER — ATORVASTATIN CALCIUM 80 MG/1
80 TABLET, FILM COATED ORAL
Status: DISCONTINUED | OUTPATIENT
Start: 2025-02-07 | End: 2025-02-11 | Stop reason: HOSPADM

## 2025-02-07 RX ORDER — TERAZOSIN 5 MG/1
10 CAPSULE ORAL
Status: DISCONTINUED | OUTPATIENT
Start: 2025-02-07 | End: 2025-02-11 | Stop reason: HOSPADM

## 2025-02-07 RX ORDER — HEPARIN SODIUM 1000 [USP'U]/ML
2000 INJECTION, SOLUTION INTRAVENOUS; SUBCUTANEOUS EVERY 6 HOURS PRN
Status: DISCONTINUED | OUTPATIENT
Start: 2025-02-07 | End: 2025-02-07 | Stop reason: HOSPADM

## 2025-02-07 RX ORDER — LOSARTAN POTASSIUM 50 MG/1
100 TABLET ORAL DAILY
Status: DISCONTINUED | OUTPATIENT
Start: 2025-02-08 | End: 2025-02-08

## 2025-02-07 RX ORDER — HEPARIN SODIUM 10000 [USP'U]/100ML
3-20 INJECTION, SOLUTION INTRAVENOUS
Status: DISCONTINUED | OUTPATIENT
Start: 2025-02-07 | End: 2025-02-07 | Stop reason: HOSPADM

## 2025-02-07 RX ORDER — HEPARIN SODIUM 1000 [USP'U]/ML
4000 INJECTION, SOLUTION INTRAVENOUS; SUBCUTANEOUS EVERY 6 HOURS PRN
Status: DISCONTINUED | OUTPATIENT
Start: 2025-02-07 | End: 2025-02-07 | Stop reason: HOSPADM

## 2025-02-07 RX ORDER — GABAPENTIN 100 MG/1
100 CAPSULE ORAL 3 TIMES DAILY
Status: DISCONTINUED | OUTPATIENT
Start: 2025-02-07 | End: 2025-02-11 | Stop reason: HOSPADM

## 2025-02-07 RX ADMIN — HEPARIN SODIUM 12 UNITS/KG/HR: 10000 INJECTION, SOLUTION INTRAVENOUS at 10:37

## 2025-02-07 RX ADMIN — ATORVASTATIN CALCIUM 80 MG: 80 TABLET, FILM COATED ORAL at 21:57

## 2025-02-07 RX ADMIN — GABAPENTIN 100 MG: 100 CAPSULE ORAL at 21:57

## 2025-02-07 RX ADMIN — ASPIRIN 81 MG CHEWABLE TABLET 243 MG: 81 TABLET CHEWABLE at 10:26

## 2025-02-07 RX ADMIN — CLONIDINE HYDROCHLORIDE 0.2 MG: 0.2 TABLET ORAL at 21:56

## 2025-02-07 RX ADMIN — TICAGRELOR 180 MG: 90 TABLET ORAL at 10:27

## 2025-02-07 RX ADMIN — TICAGRELOR 90 MG: 90 TABLET ORAL at 21:56

## 2025-02-07 RX ADMIN — HEPARIN SODIUM 12 UNITS/KG/HR: 10000 INJECTION, SOLUTION INTRAVENOUS at 20:32

## 2025-02-07 RX ADMIN — HEPARIN SODIUM 4000 UNITS: 1000 INJECTION INTRAVENOUS; SUBCUTANEOUS at 10:37

## 2025-02-07 NOTE — ED NOTES
Patient resting comfortably.  Denies pain.  Given blanket per request.  No other requests or complaints at this time.      Kate Poe RN  02/07/25 [id number]

## 2025-02-07 NOTE — ED PROVIDER NOTES
"  ED Disposition       None          Assessment & Plan   {Hyperlinks  Risk Stratification - NIHSS - HEART SCORE - Fill out sepsis note and make sure you call [phone] if severe or septic shock:[id number]}    Medical Decision Making  73 y/o M hx of HTN, HLD, and DM, presenting for sudden onset chest pain - substernal x 3 days. Non-radiating - significantly worse with exertion. EKG shows concern for posterior STEMI - with notable elevations on posterior EKG - patient transferred to [facility] for admission / cardiology consultation.     All imaging and/or lab testing discussed with patient. Patient and/or family members verbalizes understanding and agrees with plan for admission. Patient is stable for admission.     Portions of the record may have been created with voice recognition software. Occasional wrong word or \"sound a like\" substitutions may have occurred due to the inherent limitations of voice recognition software. Read the chart carefully and recognize, using context, where substitutions have occurred.    Amount and/or Complexity of Data Reviewed  Labs: ordered.  Radiology: ordered.    Risk  OTC drugs.  Prescription drug management.             Medications   aspirin chewable tablet 243 mg (has no administration in time range)   ticagrelor (BRILINTA) tablet 180 mg (has no administration in time range)   heparin (ACS LOW) (has no administration in time range)       ED Risk Strat Scores                          SBIRT 22yo+      Flowsheet Row Most Recent Value   Initial Alcohol Screen: US AUDIT-C     1. How often do you have a drink containing alcohol? 0 Filed at: 02/07/2025 1019   2. How many drinks containing alcohol do you have on a typical day you are drinking?  0 Filed at: 02/07/2025 1019   3a. Male UNDER 65: How often do you have five or more drinks on one occasion? 0 Filed at: 02/07/2025 1019   3b. FEMALE Any Age, or MALE 65+: How often do you have 4 or more drinks on one occassion? 0 Filed at: " "02/07/2025 1019   Audit-C Score 0 Filed at: 02/07/2025 1019   KIMBERLEE: How many times in the past year have you...    Used an illegal drug or used a prescription medication for non-medical reasons? Never Filed at: 02/07/2025 1019                            History of Present Illness   {Hyperlinks  History (Med, Surg, Fam, Social) - Current Medications - Allergies  :[id number]}    Chief Complaint   Patient presents with    Chest Pain     L sided CP \"pressure\" that began 3 days ago; denies SOB/dizziness       Past Medical History:   Diagnosis Date    Diabetes mellitus (HCC)       Past Surgical History:   Procedure Laterality Date    APPENDECTOMY Right 1970    PARAMEDIAN INCISION      History reviewed. No pertinent family history.   Social History     Tobacco Use    Smoking status: Former     Types: Cigarettes    Smokeless tobacco: Former    Tobacco comments:     Quit 2001   Vaping Use    Vaping status: Never Used   Substance Use Topics    Alcohol use: Never    Drug use: Never      E-Cigarette/Vaping    E-Cigarette Use Never User       E-Cigarette/Vaping Substances    Nicotine No     THC No     CBD No     Flavoring No     Other No     Unknown No       I have reviewed and agree with the history as documented.     74-year-old male multiple significant past medical conditions including hypertension, hyperlipidemia, diabetes presenting for evaluation of sudden onset of substernal chest pain which does not radiate and worsens significantly with exertion -he reports some lower leg swelling.  He denies palpitations, reports no dyspnea.  He reports he takes aspirin daily and was compliant with his 81 mg this morning.  He denies a history of heart attack in the past.      Chest Pain  Associated symptoms: no abdominal pain, no dizziness, no fatigue, no fever, no nausea, no numbness, no palpitations, no shortness of breath and not vomiting        Review of Systems   Constitutional:  Negative for chills, fatigue and fever.   HENT:  " Negative for congestion, ear pain, rhinorrhea and sore throat.    Eyes:  Negative for redness.   Respiratory:  Negative for chest tightness and shortness of breath.    Cardiovascular:  Positive for chest pain and leg swelling. Negative for palpitations.   Gastrointestinal:  Negative for abdominal pain, nausea and vomiting.   Genitourinary:  Negative for dysuria and hematuria.   Musculoskeletal: Negative.    Skin:  Negative for rash.   Neurological:  Negative for dizziness, syncope, light-headedness and numbness.           Objective   {Hyperlinks  Historical Vitals - Historical Labs - Chart Review/Microbiology - Last Echo - Code Status  :[id number]}    ED Triage Vitals [02/07/25 1020]   Temperature Pulse Blood Pressure Respirations SpO2 Patient Position - Orthostatic VS   97.6 °F (36.4 °C) 68 156/87 18 99 % --      Temp src Heart Rate Source BP Location FiO2 (%) Pain Score    -- -- -- -- --      Vitals      Date and Time Temp Pulse SpO2 Resp BP Pain Score FACES Pain Rating User   02/07/25 1020 97.6 °F (36.4 °C) 68 99 % 18 156/87 -- -- AM            Physical Exam  Vitals and nursing note reviewed.   Constitutional:       Appearance: Normal appearance. He is well-developed.   HENT:      Head: Normocephalic and atraumatic.   Eyes:      General: No scleral icterus.     Pupils: Pupils are equal, round, and reactive to light.   Cardiovascular:      Rate and Rhythm: Normal rate and regular rhythm.      Pulses: Normal pulses.   Pulmonary:      Effort: Pulmonary effort is normal. No respiratory distress.      Breath sounds: No stridor.   Abdominal:      General: There is no distension.      Palpations: There is no mass.   Musculoskeletal:      Cervical back: Normal range of motion.   Skin:     General: Skin is warm and dry.      Capillary Refill: Capillary refill takes less than 2 seconds.      Coloration: Skin is not jaundiced.   Neurological:      Mental Status: He is alert and oriented to person, place, and time.       Gait: Gait normal.   Psychiatric:         Mood and Affect: Mood normal.         Results Reviewed       Procedure Component Value Units Date/Time    APTT six (6) hours after Heparin bolus/drip initiation or dosing change [[id number]]     Lab Status: No result Specimen: Blood     Protime-INR [[id number]]     Lab Status: No result Specimen: Blood     CBC and differential [[id number]] Collected: 02/07/25 1016    Lab Status: Final result Specimen: Blood from Arm, Left Updated: 02/07/25 1022     WBC 7.72 Thousand/uL      RBC 4.65 Million/uL      Hemoglobin 13.9 g/dL      Hematocrit 42.7 %      MCV 92 fL      MCH 29.9 pg      MCHC 32.6 g/dL      RDW 12.1 %      MPV 10.3 fL      Platelets 168 Thousands/uL      nRBC 0 /100 WBCs      Segmented % 50 %      Immature Grans % 0 %      Lymphocytes % 39 %      Monocytes % 9 %      Eosinophils Relative 1 %      Basophils Relative 1 %      Absolute Neutrophils 3.82 Thousands/µL      Absolute Immature Grans 0.02 Thousand/uL      Absolute Lymphocytes 3.03 Thousands/µL      Absolute Monocytes 0.71 Thousand/µL      Eosinophils Absolute 0.10 Thousand/µL      Basophils Absolute 0.04 Thousands/µL     B-Type Natriuretic Peptide(BNP) [[id number]] Collected: 02/07/25 1016    Lab Status: In process Specimen: Blood from Arm, Left Updated: 02/07/25 1019    Comprehensive metabolic panel [[id number]] Collected: 02/07/25 1016    Lab Status: In process Specimen: Blood from Arm, Left Updated: 02/07/25 1019    HS Troponin 0hr (reflex protocol) [[id number]] Collected: 02/07/25 1016    Lab Status: In process Specimen: Blood from Arm, Left Updated: 02/07/25 1019    FLU/COVID Rapid Antigen (30 min. TAT) - Preferred screening test in ED [[id number]] Collected: 02/07/25 1016    Lab Status: In process Specimen: Nares from Nose Updated: 02/07/25 1019            XR chest 2 views    (Results Pending)       ECG 12 Lead Documentation Only    Date/Time: 2/7/2025 10:15 AM    Performed by: Columba Givens  AVILA  Authorized by: Columba Givens PA-C    Indications / Diagnosis:  Chest pain  ECG reviewed by me, the ED Provider: yes    Patient location:  ED  Previous ECG:     Previous ECG:  Compared to current    Comparison ECG info:  8/12/20 - new lateral / posterior STEMI pattern noted    Similarity:  Changes noted    Comparison to cardiac monitor: Yes    Interpretation:     Interpretation: normal    Rate:     ECG rate:  67    ECG rate assessment: normal    Rhythm:     Rhythm: sinus rhythm    Ectopy:     Ectopy: none    QRS:     QRS axis:  Left  ST segments:     ST segments:  Abnormal    Depression:  V5 and V6  T waves:     T waves: inverted      Inverted:  V3, V4, V5 and V6  Comments:        QRS 70      ECG 12 Lead Documentation Only    Date/Time: 2/7/2025 10:23 AM    Performed by: Columba Givens PA-C  Authorized by: Columba Givens PA-C    Indications / Diagnosis:  Chest pain  ECG reviewed by me, the ED Provider: yes    Patient location:  ED  Previous ECG:     Comparison to cardiac monitor: Yes    Interpretation:     Interpretation: normal    Rate:     ECG rate:  66    ECG rate assessment: normal    Rhythm:     Rhythm: sinus rhythm    Ectopy:     Ectopy: none    QRS:     QRS axis:  Left    QRS intervals:  Normal  Conduction:     Conduction: normal    ST segments:     ST segments:  Abnormal (POSTERIOR EKG)    Elevation:  V3, V4, V5 and V6  T waves:     T waves: normal    Comments:        QRS 68          ED Medication and Procedure Management   Prior to Admission Medications   Prescriptions Last Dose Informant Patient Reported? Taking?   Aspirin Low Dose 81 MG EC tablet  Self No No   Sig: TAKE ONE TABLET BY MOUTH DAILY   Blood Glucose Monitoring Suppl (OneTouch Verio Reflect) w/Device KIT   No No   Sig: Check blood sugars three times daily. Please substitute with appropriate alternative as covered by patient's insurance. Dx: E11.65   Continuous Blood  Gluc Sensor (FreeStyle Lucas 14 Day Sensor) MISC  Self Yes No   Continuous Blood Gluc Sensor (FreeStyle Lucas 14 Day Sensor) MISC  Self Yes No   Sig: Use 1 each every 14 (fourteen) days   EASY COMFORT PEN NEEDLES 32G X 4 MM MISC  Self Yes No   Empagliflozin 25 MG TABS   No No   Sig: Take 1 tablet (25 mg total) by mouth in the morning   Evolocumab 140 MG/ML SOAJ   No No   Sig: Inject 1 mL (140 mg total) under the skin every 14 (fourteen) days   Insulin Pen Needle 32G X 4 MM MISC  Self Yes No   Sig: Use with flexpens BID   Multiple Vitamins-Minerals (Vitramyn) TABS   No No   Sig: TAKE ONE TABLET BY MOUTH ONCE DAILY   Narcan 4 MG/0.1ML nasal spray  Self No No   Sig: USE ONE SPRAY IN ONE NOSTRIL FOR ONE DOSE. IF THE DESIRED RESPONSE IS NOT OBTAINED AFTER 2-3 MINUTES, ADMINISTER ADDITIONAL DOSE IN OPPOSITE NOSTRIL USING NEW SPRAY   OneTouch Delica Lancets 33G MISC   No No   Sig: Check blood sugars three times daily. Please substitute with appropriate alternative as covered by patient's insurance. Dx: E11.65   SURE COMFORT LANCETS 30G MISC  Self Yes No   Sig: use as directed   Tirzepatide (Mounjaro) 10 MG/0.5ML SOAJ   No No   Sig: Inject 10 mg under the skin once a week   UltiCare Alcohol Swabs 70 % PADS   No No   Sig: USE TO CLEAN THE AREAS BEFORE TESTING BLOOD SUGAR OR/AND INJECTING INSULIN ONCE DAILY   albuterol (PROVENTIL HFA,VENTOLIN HFA) 90 mcg/act inhaler   No No   Sig: INHALE 2 PUFFS EVERY 6 HOURS AS NEEDED FOR WHEEZING Strength: 108 (90 Base) MCG/ACT   amLODIPine (NORVASC) 10 mg tablet   No No   Sig: TAKE ONE TABLET BY MOUTH ONCE DAILY   ammonium lactate (LAC-HYDRIN) 12 % cream  Self No No   Sig: APPLY TOPICALLY TO AFFECTED AREA ONCE DAILY AS NEEDED FOR DRY SKIN   aspirin (ECOTRIN LOW STRENGTH) 81 mg EC tablet  Self Yes No   Sig: Take 81 mg by mouth daily   atorvastatin (LIPITOR) 80 mg tablet   No No   Sig: TAKE ONE TABLET BY MOUTH ONCE DAILY WITH DINNER   carbamide peroxide (DEBROX) 6.5 % otic solution   No No    Sig: Administer 5 drops into ears 2 (two) times a day for 4 days   cloNIDine (CATAPRES) 0.2 mg tablet   Yes No   Sig: Take 0.2 mg by mouth 2 (two) times a day   ergocalciferol (VITAMIN D2) 50,000 units  Self No No   Sig: Take 1 capsule weekly x8 weeks, then 2000 units p.o. Daily.   ezetimibe (ZETIA) 10 mg tablet   No No   Sig: TAKE ONE TABLET BY MOUTH ONCE DAILY   gabapentin (NEURONTIN) 300 mg capsule   No No   Sig: TAKE ONE CAPSULE BY MOUTH THREE TIMES A DAY .START WITH ONCE DAILY .INCREASE TO TWICE A DAY FOR 7 DAY .THEN CONTINUE THREE TIMES A DAY   glucose blood (FREESTYLE LITE) test strip   No No   Sig: Use 1 each 3 (three) times a day Use as instructed   glucose blood (OneTouch Verio) test strip   No No   Sig: Check blood sugars three times daily. Please substitute with appropriate alternative as covered by patient's insurance. Dx: E11.65   ibuprofen (MOTRIN) 800 mg tablet  Self Yes No   insulin aspart protamine-insulin aspart (NovoLOG Mix 70/30 FlexPen) 100 Units/mL injection pen  Self Yes No   Sig: Inject 40 units subcutaneously twice a day before meals   losartan (COZAAR) 100 MG tablet   No No   Sig: Take 1 tablet (100 mg total) by mouth daily   metFORMIN (GLUCOPHAGE-XR) 750 mg 24 hr tablet   No No   Sig: TAKE ONE TABLET BY MOUTH ONCE DAILY WITH BREAKFAST   pantoprazole (PROTONIX) 40 mg tablet   No No   Sig: Take 1 tablet (40 mg total) by mouth daily   tadalafil (CIALIS) 10 MG tablet  Self Yes No   Sig: Take 10 mg by mouth   terazosin (HYTRIN) 10 MG capsule  Self Yes No   Sig: Take 10 mg by mouth   traMADol (ULTRAM) 50 mg tablet   No No   Sig: Take 1 tablet (50 mg total) by mouth every 12 (twelve) hours as needed for moderate pain   triamcinolone (KENALOG) 0.1 % cream  Self No No   Sig: APPLY TOPICALLY TO AFFECTED AREA TWICE A DAY AS NEEDED FOR RASH      Facility-Administered Medications: None     Patient's Medications   Discharge Prescriptions    No medications on file     No discharge procedures on  file.  ED SEPSIS DOCUMENTATION          sugars three times daily. Please substitute with appropriate alternative as covered by patient's insurance. Dx: E11.65   Continuous Blood Gluc Sensor (FreeStyle Lucas 14 Day Sensor) MISC  Self Yes No   Continuous Blood Gluc Sensor (FreeStyle Lucas 14 Day Sensor) MISC  Self Yes No   Sig: Use 1 each every 14 (fourteen) days   EASY COMFORT PEN NEEDLES 32G X 4 MM MISC  Self Yes No   Empagliflozin 25 MG TABS   No No   Sig: Take 1 tablet (25 mg total) by mouth in the morning   Evolocumab 140 MG/ML SOAJ   No No   Sig: Inject 1 mL (140 mg total) under the skin every 14 (fourteen) days   Insulin Pen Needle 32G X 4 MM MISC  Self Yes No   Sig: Use with flexpens BID   Multiple Vitamins-Minerals (Vitramyn) TABS   No No   Sig: TAKE ONE TABLET BY MOUTH ONCE DAILY   Narcan 4 MG/0.1ML nasal spray  Self No No   Sig: USE ONE SPRAY IN ONE NOSTRIL FOR ONE DOSE. IF THE DESIRED RESPONSE IS NOT OBTAINED AFTER 2-3 MINUTES, ADMINISTER ADDITIONAL DOSE IN OPPOSITE NOSTRIL USING NEW SPRAY   OneTouch Delica Lancets 33G MISC   No No   Sig: Check blood sugars three times daily. Please substitute with appropriate alternative as covered by patient's insurance. Dx: E11.65   SURE COMFORT LANCETS 30G MISC  Self Yes No   Sig: use as directed   Tirzepatide (Mounjaro) 10 MG/0.5ML SOAJ   No No   Sig: Inject 10 mg under the skin once a week   UltiCare Alcohol Swabs 70 % PADS   No No   Sig: USE TO CLEAN THE AREAS BEFORE TESTING BLOOD SUGAR OR/AND INJECTING INSULIN ONCE DAILY   albuterol (PROVENTIL HFA,VENTOLIN HFA) 90 mcg/act inhaler   No No   Sig: INHALE 2 PUFFS EVERY 6 HOURS AS NEEDED FOR WHEEZING Strength: 108 (90 Base) MCG/ACT   amLODIPine (NORVASC) 10 mg tablet   No No   Sig: TAKE ONE TABLET BY MOUTH ONCE DAILY   ammonium lactate (LAC-HYDRIN) 12 % cream  Self No No   Sig: APPLY TOPICALLY TO AFFECTED AREA ONCE DAILY AS NEEDED FOR DRY SKIN   aspirin (ECOTRIN LOW STRENGTH) 81 mg EC tablet  Self Yes No   Sig: Take 81 mg by mouth daily   atorvastatin  (LIPITOR) 80 mg tablet   No No   Sig: TAKE ONE TABLET BY MOUTH ONCE DAILY WITH DINNER   carbamide peroxide (DEBROX) 6.5 % otic solution   No No   Sig: Administer 5 drops into ears 2 (two) times a day for 4 days   cloNIDine (CATAPRES) 0.2 mg tablet   Yes No   Sig: Take 0.2 mg by mouth 2 (two) times a day   ergocalciferol (VITAMIN D2) 50,000 units  Self No No   Sig: Take 1 capsule weekly x8 weeks, then 2000 units p.o. Daily.   ezetimibe (ZETIA) 10 mg tablet   No No   Sig: TAKE ONE TABLET BY MOUTH ONCE DAILY   gabapentin (NEURONTIN) 300 mg capsule   No No   Sig: TAKE ONE CAPSULE BY MOUTH THREE TIMES A DAY .START WITH ONCE DAILY .INCREASE TO TWICE A DAY FOR 7 DAY .THEN CONTINUE THREE TIMES A DAY   glucose blood (FREESTYLE LITE) test strip   No No   Sig: Use 1 each 3 (three) times a day Use as instructed   glucose blood (OneTouch Verio) test strip   No No   Sig: Check blood sugars three times daily. Please substitute with appropriate alternative as covered by patient's insurance. Dx: E11.65   ibuprofen (MOTRIN) 800 mg tablet  Self Yes No   insulin aspart protamine-insulin aspart (NovoLOG Mix 70/30 FlexPen) 100 Units/mL injection pen  Self Yes No   Sig: Inject 40 units subcutaneously twice a day before meals   losartan (COZAAR) 100 MG tablet   No No   Sig: Take 1 tablet (100 mg total) by mouth daily   metFORMIN (GLUCOPHAGE-XR) 750 mg 24 hr tablet   No No   Sig: TAKE ONE TABLET BY MOUTH ONCE DAILY WITH BREAKFAST   pantoprazole (PROTONIX) 40 mg tablet   No No   Sig: Take 1 tablet (40 mg total) by mouth daily   tadalafil (CIALIS) 10 MG tablet  Self Yes No   Sig: Take 10 mg by mouth   terazosin (HYTRIN) 10 MG capsule  Self Yes No   Sig: Take 10 mg by mouth   traMADol (ULTRAM) 50 mg tablet   No No   Sig: Take 1 tablet (50 mg total) by mouth every 12 (twelve) hours as needed for moderate pain   triamcinolone (KENALOG) 0.1 % cream  Self No No   Sig: APPLY TOPICALLY TO AFFECTED AREA TWICE A DAY AS NEEDED FOR RASH       Facility-Administered Medications: None     Discharge Medication List as of 2/7/2025  7:05 PM        CONTINUE these medications which have NOT CHANGED    Details   albuterol (PROVENTIL HFA,VENTOLIN HFA) 90 mcg/act inhaler INHALE 2 PUFFS EVERY 6 HOURS AS NEEDED FOR WHEEZING Strength: 108 (90 Base) MCG/ACT, Normal      amLODIPine (NORVASC) 10 mg tablet TAKE ONE TABLET BY MOUTH ONCE DAILY, Starting Mon 5/13/2024, Normal      ammonium lactate (LAC-HYDRIN) 12 % cream APPLY TOPICALLY TO AFFECTED AREA ONCE DAILY AS NEEDED FOR DRY SKIN, Normal      !! aspirin (ECOTRIN LOW STRENGTH) 81 mg EC tablet Take 81 mg by mouth daily, Starting Wed 4/14/2021, Historical Med      !! Aspirin Low Dose 81 MG EC tablet TAKE ONE TABLET BY MOUTH DAILY, Normal      atorvastatin (LIPITOR) 80 mg tablet TAKE ONE TABLET BY MOUTH ONCE DAILY WITH DINNER, Starting Mon 2/12/2024, Normal      Blood Glucose Monitoring Suppl (OneTouch Verio Reflect) w/Device KIT Check blood sugars three times daily. Please substitute with appropriate alternative as covered by patient's insurance. Dx: E11.65, Normal      carbamide peroxide (DEBROX) 6.5 % otic solution Administer 5 drops into ears 2 (two) times a day for 4 days, Starting Thu 5/2/2024, Until Wed 1/15/2025, Normal      cloNIDine (CATAPRES) 0.2 mg tablet Take 0.2 mg by mouth 2 (two) times a day, Starting Wed 8/7/2024, Until Thu 8/7/2025, Historical Med      !! Continuous Blood Gluc Sensor (FreeStyle Lucas 14 Day Sensor) MISC Starting Tue 11/3/2020, Historical Med      !! Continuous Blood Gluc Sensor (FreeStyle Lucas 14 Day Sensor) MISC Use 1 each every 14 (fourteen) days, Starting Thu 7/1/2021, Historical Med      !! EASY COMFORT PEN NEEDLES 32G X 4 MM MISC Starting Fri 6/12/2020, Historical Med      Empagliflozin 25 MG TABS Take 1 tablet (25 mg total) by mouth in the morning, Starting Fri 5/26/2023, Normal      ergocalciferol (VITAMIN D2) 50,000 units Take 1 capsule weekly x8 weeks, then 2000 units p.o.  Daily., Normal      Evolocumab 140 MG/ML SOAJ Inject 1 mL (140 mg total) under the skin every 14 (fourteen) days, Starting Tue 12/17/2024, Normal      ezetimibe (ZETIA) 10 mg tablet TAKE ONE TABLET BY MOUTH ONCE DAILY, Starting Fri 1/10/2025, Normal      gabapentin (NEURONTIN) 300 mg capsule TAKE ONE CAPSULE BY MOUTH THREE TIMES A DAY .START WITH ONCE DAILY .INCREASE TO TWICE A DAY FOR 7 DAY .THEN CONTINUE THREE TIMES A DAY, Normal      !! glucose blood (FREESTYLE LITE) test strip Use 1 each 3 (three) times a day Use as instructed, Starting Fri 10/18/2024, Normal      !! glucose blood (OneTouch Verio) test strip Check blood sugars three times daily. Please substitute with appropriate alternative as covered by patient's insurance. Dx: E11.65, Normal      ibuprofen (MOTRIN) 800 mg tablet Starting Wed 2/24/2021, Historical Med      insulin aspart protamine-insulin aspart (NovoLOG Mix 70/30 FlexPen) 100 Units/mL injection pen Inject 40 units subcutaneously twice a day before meals, Historical Med      !! Insulin Pen Needle 32G X 4 MM MISC Use with flexpens BID, Historical Med      losartan (COZAAR) 100 MG tablet Take 1 tablet (100 mg total) by mouth daily, Starting Fri 5/26/2023, Normal      metFORMIN (GLUCOPHAGE-XR) 750 mg 24 hr tablet TAKE ONE TABLET BY MOUTH ONCE DAILY WITH BREAKFAST, Starting Fri 11/8/2024, Normal      Multiple Vitamins-Minerals (Vitramyn) TABS TAKE ONE TABLET BY MOUTH ONCE DAILY, Starting Wed 6/19/2024, Normal      Narcan 4 MG/0.1ML nasal spray USE ONE SPRAY IN ONE NOSTRIL FOR ONE DOSE. IF THE DESIRED RESPONSE IS NOT OBTAINED AFTER 2-3 MINUTES, ADMINISTER ADDITIONAL DOSE IN OPPOSITE NOSTRIL USING NEW SPRAY, Normal      !! OneTouch Delica Lancets 33G MISC Check blood sugars three times daily. Please substitute with appropriate alternative as covered by patient's insurance. Dx: E11.65, Normal      pantoprazole (PROTONIX) 40 mg tablet Take 1 tablet (40 mg total) by mouth daily, Starting Fri 5/26/2023,  Normal      !! SURE COMFORT LANCETS 30G MISC use as directed, Historical Med      tadalafil (CIALIS) 10 MG tablet Take 10 mg by mouth, Starting Fri 4/30/2021, Historical Med      terazosin (HYTRIN) 10 MG capsule Take 10 mg by mouth, Starting Thu 7/1/2021, Until Wed 1/15/2025 at 2359, Historical Med      Tirzepatide (Mounjaro) 10 MG/0.5ML SOAJ Inject 10 mg under the skin once a week, Starting Wed 1/15/2025, Normal      traMADol (ULTRAM) 50 mg tablet Take 1 tablet (50 mg total) by mouth every 12 (twelve) hours as needed for moderate pain, Starting Wed 1/15/2025, Normal      triamcinolone (KENALOG) 0.1 % cream APPLY TOPICALLY TO AFFECTED AREA TWICE A DAY AS NEEDED FOR RASH, Normal      UltiCare Alcohol Swabs 70 % PADS USE TO CLEAN THE AREAS BEFORE TESTING BLOOD SUGAR OR/AND INJECTING INSULIN ONCE DAILY, Normal       !! - Potential duplicate medications found. Please discuss with provider.        No discharge procedures on file.  ED SEPSIS DOCUMENTATION   Time reflects when diagnosis was documented in both MDM as applicable and the Disposition within this note       Time User Action Codes Description Comment    2/7/2025 10:37 AM Columba Givens Add [I21.3] STEMI (ST elevation myocardial infarction) ([facility])                  Columba Givens PA-C  02/11/25 0803

## 2025-02-07 NOTE — EMTALA/ACUTE CARE TRANSFER
[facility] EMERGENCY DEPARTMENT  [address] [address]  [phone]  Dept: [phone]      EMTALA TRANSFER CONSENT    NAME Wagner COE 1950                              MRN [id number]    I have been informed of my rights regarding examination, treatment, and transfer   by Dr. Dale Chavez MD    Benefits: Specialized equipment and/or services available at the receiving facility (Include comment)________________________    Risks: Potential for delay in receiving treatment      Consent for Transfer:  I acknowledge that my medical condition has been evaluated and explained to me by the emergency department physician or other qualified medical person and/or my attending physician, who has recommended that I be transferred to the service of  Accepting Physician:  at Accepting Facility Name, City & State : [facility]. The above potential benefits of such transfer, the potential risks associated with such transfer, and the probable risks of not being transferred have been explained to me, and I fully understand them.  The doctor has explained that, in my case, the benefits of transfer outweigh the risks.  I agree to be transferred.    I authorize the performance of emergency medical procedures and treatments upon me in both transit and upon arrival at the receiving facility.  Additionally, I authorize the release of any and all medical records to the receiving facility and request they be transported with me, if possible.  I understand that the safest mode of transportation during a medical emergency is an ambulance and that the Hospital advocates the use of this mode of transport. Risks of traveling to the receiving facility by car, including absence of medical control, life sustaining equipment, such as oxygen, and medical personnel has been explained to me and I fully understand them.    (VIOLA CORRECT BOX BELOW)  [  ]  I  consent to the stated transfer and to be transported by ambulance/helicopter.  [  ]  I consent to the stated transfer, but refuse transportation by ambulance and accept full responsibility for my transportation by car.  I understand the risks of non-ambulance transfers and I exonerate the Hospital and its staff from any deterioration in my condition that results from this refusal.    X___________________________________________    DATE  25  TIME________  Signature of patient or legally responsible individual signing on patient behalf           RELATIONSHIP TO PATIENT_________________________          Provider Certification    NAME Wagner Ochoa                                         1950                              MRN [id number]    A medical screening exam was performed on the above named patient.  Based on the examination:    Condition Necessitating Transfer The encounter diagnosis was STEMI (ST elevation myocardial infarction) (HCC).    Patient Condition: The patient has been stabilized such that within reasonable medical probability, no material deterioration of the patient condition or the condition of the unborn child(ki) is likely to result from the transfer    Reason for Transfer: Level of Care needed not available at this facility    Transfer Requirements: Facility [facility]   Space available and qualified personnel available for treatment as acknowledged by PACS  Agreed to accept transfer and to provide appropriate medical treatment as acknowledged by         Appropriate medical records of the examination and treatment of the patient are provided at the time of transfer   STAFF INITIAL WHEN COMPLETED _______  Transfer will be performed by qualified personnel from [facility]  and appropriate transfer equipment as required, including the use of necessary and appropriate life support measures.    Provider Certification: I have examined the patient and explained the following risks and  benefits of being transferred/refusing transfer to the patient/family:  General risk, such as traffic hazards, adverse weather conditions, rough terrain or turbulence, possible failure of equipment (including vehicle or aircraft), or consequences of actions of persons outside the control of the transport personnel      Based on these reasonable risks and benefits to the patient and/or the unborn child(ki), and based upon the information available at the time of the patient’s examination, I certify that the medical benefits reasonably to be expected from the provision of appropriate medical treatments at another medical facility outweigh the increasing risks, if any, to the individual’s medical condition, and in the case of labor to the unborn child, from effecting the transfer.    X____________________________________________ DATE 02/07/25        TIME_______      ORIGINAL - SEND TO MEDICAL RECORDS   COPY - SEND WITH PATIENT DURING TRANSFER

## 2025-02-07 NOTE — ED NOTES
No change to Heparin dosage.    PTT: 69 seconds    60 - 90: No change - Therapeutic Goal      Kate Poe RN  02/07/25 [id number]

## 2025-02-07 NOTE — LETTER
[facility] CARDIAC CATH LAB  [address]  [place] [address]  Dept: [phone]    February 10, 2025     To Whom it May Concern:    Zuhair Mccauley missed work on 02/10/25 due to an ill family member.     If you have any questions or concerns, please don't hesitate to call.         Sincerely,          Marlys Dunn PA-C

## 2025-02-08 ENCOUNTER — APPOINTMENT (INPATIENT)
Dept: NON INVASIVE DIAGNOSTICS | Facility: HOSPITAL | Age: 75
DRG: 322 | End: 2025-02-08
Payer: MEDICARE

## 2025-02-08 LAB
ANION GAP SERPL CALCULATED.3IONS-SCNC: 7 MMOL/L (ref 4–13)
AORTIC ROOT: 3 CM
APTT PPP: 63 SECONDS (ref 23–34)
ASCENDING AORTA: 3 CM
BACTERIA UR QL AUTO: ABNORMAL /HPF
BILIRUB UR QL STRIP: NEGATIVE
BSA FOR ECHO PROCEDURE: 1.87 M2
BUN SERPL-MCNC: 20 MG/DL (ref 5–25)
CALCIUM SERPL-MCNC: 9.2 MG/DL (ref 8.4–10.2)
CHLORIDE SERPL-SCNC: 104 MMOL/L (ref 96–108)
CLARITY UR: CLEAR
CO2 SERPL-SCNC: 25 MMOL/L (ref 21–32)
COLOR UR: ABNORMAL
CREAT SERPL-MCNC: 0.91 MG/DL (ref 0.6–1.3)
E WAVE DECELERATION TIME: 201 MS
E/A RATIO: 0.74
ERYTHROCYTE [DISTWIDTH] IN BLOOD BY AUTOMATED COUNT: 12.3 % (ref 11.6–15.1)
FLUAV RNA RESP QL NAA+PROBE: NEGATIVE
FLUBV RNA RESP QL NAA+PROBE: NEGATIVE
FRACTIONAL SHORTENING: 26 (ref 28–44)
GFR SERPL CREATININE-BSD FRML MDRD: 81 ML/MIN/1.73SQ M
GLUCOSE SERPL-MCNC: 156 MG/DL (ref 65–140)
GLUCOSE SERPL-MCNC: 170 MG/DL (ref 65–140)
GLUCOSE SERPL-MCNC: 264 MG/DL (ref 65–140)
GLUCOSE SERPL-MCNC: 279 MG/DL (ref 65–140)
GLUCOSE SERPL-MCNC: 69 MG/DL (ref 65–140)
GLUCOSE UR STRIP-MCNC: ABNORMAL MG/DL
HCT VFR BLD AUTO: 37.8 % (ref 36.5–49.3)
HGB BLD-MCNC: 12.3 G/DL (ref 12–17)
HGB UR QL STRIP.AUTO: NEGATIVE
HYALINE CASTS #/AREA URNS LPF: ABNORMAL /LPF
INTERVENTRICULAR SEPTUM IN DIASTOLE (PARASTERNAL SHORT AXIS VIEW): 1.1 CM
INTERVENTRICULAR SEPTUM: 1.1 CM (ref 0.6–1.1)
KETONES UR STRIP-MCNC: ABNORMAL MG/DL
LAAS-AP2: 15.6 CM2
LAAS-AP4: 14.7 CM2
LACTATE SERPL-SCNC: 1.5 MMOL/L (ref 0.5–2)
LEFT ATRIUM SIZE: 3.2 CM
LEFT ATRIUM VOLUME (MOD BIPLANE): 36 ML
LEFT ATRIUM VOLUME INDEX (MOD BIPLANE): 19.3 ML/M2
LEFT INTERNAL DIMENSION IN SYSTOLE: 3.1 CM (ref 2.1–4)
LEFT VENTRICLE DIASTOLIC VOLUME (MOD BIPLANE): 90 ML
LEFT VENTRICLE DIASTOLIC VOLUME INDEX (MOD BIPLANE): 48.1 ML/M2
LEFT VENTRICLE SYSTOLIC VOLUME (MOD BIPLANE): 52 ML
LEFT VENTRICLE SYSTOLIC VOLUME INDEX (MOD BIPLANE): 27.8 ML/M2
LEFT VENTRICULAR INTERNAL DIMENSION IN DIASTOLE: 4.2 CM (ref 3.5–6)
LEFT VENTRICULAR POSTERIOR WALL IN END DIASTOLE: 1.2 CM
LEFT VENTRICULAR STROKE VOLUME: 40 ML
LEUKOCYTE ESTERASE UR QL STRIP: NEGATIVE
LV EF BIPLANE MOD: 42 %
LV EF US.2D.A4C+ESTIMATED: 44 %
LVSV (TEICH): 40 ML
MAGNESIUM SERPL-MCNC: 1.7 MG/DL (ref 1.9–2.7)
MCH RBC QN AUTO: 29.9 PG (ref 26.8–34.3)
MCHC RBC AUTO-ENTMCNC: 32.5 G/DL (ref 31.4–37.4)
MCV RBC AUTO: 92 FL (ref 82–98)
MUCOUS THREADS UR QL AUTO: ABNORMAL
MV E'TISSUE VEL-LAT: 3 CM/S
MV E'TISSUE VEL-SEP: 7 CM/S
MV PEAK A VEL: 0.91 M/S
MV PEAK E VEL: 67 CM/S
MV STENOSIS PRESSURE HALF TIME: 58 MS
MV VALVE AREA P 1/2 METHOD: 3.79
NITRITE UR QL STRIP: NEGATIVE
NON-SQ EPI CELLS URNS QL MICRO: ABNORMAL /HPF
PH UR STRIP.AUTO: 5.5 [PH]
PLATELET # BLD AUTO: 155 THOUSANDS/UL (ref 149–390)
PMV BLD AUTO: 11.7 FL (ref 8.9–12.7)
POTASSIUM SERPL-SCNC: 5.2 MMOL/L (ref 3.5–5.3)
PROCALCITONIN SERPL-MCNC: 0.15 NG/ML
PROT UR STRIP-MCNC: ABNORMAL MG/DL
RA PRESSURE ESTIMATED: 5 MMHG
RBC # BLD AUTO: 4.12 MILLION/UL (ref 3.88–5.62)
RBC #/AREA URNS AUTO: ABNORMAL /HPF
RIGHT ATRIUM AREA SYSTOLE A4C: 12 CM2
RIGHT VENTRICLE ID DIMENSION: 3.4 CM
RSV RNA RESP QL NAA+PROBE: NEGATIVE
RV PSP: 24 MMHG
SARS-COV-2 RNA RESP QL NAA+PROBE: NEGATIVE
SL CV LEFT ATRIUM LENGTH A2C: 5 CM
SL CV LV EF: 55
SL CV PED ECHO LEFT VENTRICLE DIASTOLIC VOLUME (MOD BIPLANE) 2D: 79 ML
SL CV PED ECHO LEFT VENTRICLE SYSTOLIC VOLUME (MOD BIPLANE) 2D: 39 ML
SODIUM SERPL-SCNC: 136 MMOL/L (ref 135–147)
SP GR UR STRIP.AUTO: 1.03 (ref 1–1.03)
TR MAX PG: 19 MMHG
TR PEAK VELOCITY: 2.2 M/S
TRICUSPID ANNULAR PLANE SYSTOLIC EXCURSION: 1.6 CM
TRICUSPID VALVE PEAK REGURGITATION VELOCITY: 2.2 M/S
UROBILINOGEN UR STRIP-ACNC: 3 MG/DL
WBC # BLD AUTO: 8.16 THOUSAND/UL (ref 4.31–10.16)
WBC #/AREA URNS AUTO: ABNORMAL /HPF

## 2025-02-08 PROCEDURE — 85730 THROMBOPLASTIN TIME PARTIAL: CPT | Performed by: INTERNAL MEDICINE

## 2025-02-08 PROCEDURE — 87040 BLOOD CULTURE FOR BACTERIA: CPT | Performed by: NURSE PRACTITIONER

## 2025-02-08 PROCEDURE — 93306 TTE W/DOPPLER COMPLETE: CPT | Performed by: INTERNAL MEDICINE

## 2025-02-08 PROCEDURE — 81001 URINALYSIS AUTO W/SCOPE: CPT | Performed by: NURSE PRACTITIONER

## 2025-02-08 PROCEDURE — 82948 REAGENT STRIP/BLOOD GLUCOSE: CPT

## 2025-02-08 PROCEDURE — 85027 COMPLETE CBC AUTOMATED: CPT | Performed by: STUDENT IN AN ORGANIZED HEALTH CARE EDUCATION/TRAINING PROGRAM

## 2025-02-08 PROCEDURE — 93306 TTE W/DOPPLER COMPLETE: CPT

## 2025-02-08 PROCEDURE — 99232 SBSQ HOSP IP/OBS MODERATE 35: CPT | Performed by: PHYSICIAN ASSISTANT

## 2025-02-08 PROCEDURE — 83735 ASSAY OF MAGNESIUM: CPT | Performed by: STUDENT IN AN ORGANIZED HEALTH CARE EDUCATION/TRAINING PROGRAM

## 2025-02-08 PROCEDURE — 80048 BASIC METABOLIC PNL TOTAL CA: CPT | Performed by: STUDENT IN AN ORGANIZED HEALTH CARE EDUCATION/TRAINING PROGRAM

## 2025-02-08 PROCEDURE — 0241U HB NFCT DS VIR RESP RNA 4 TRGT: CPT | Performed by: NURSE PRACTITIONER

## 2025-02-08 PROCEDURE — 84145 PROCALCITONIN (PCT): CPT | Performed by: NURSE PRACTITIONER

## 2025-02-08 PROCEDURE — 83605 ASSAY OF LACTIC ACID: CPT | Performed by: NURSE PRACTITIONER

## 2025-02-08 RX ORDER — LOSARTAN POTASSIUM 25 MG/1
25 TABLET ORAL DAILY
Status: DISCONTINUED | OUTPATIENT
Start: 2025-02-09 | End: 2025-02-11 | Stop reason: HOSPADM

## 2025-02-08 RX ORDER — ACETAMINOPHEN 325 MG/1
650 TABLET ORAL EVERY 6 HOURS PRN
Status: DISCONTINUED | OUTPATIENT
Start: 2025-02-08 | End: 2025-02-10

## 2025-02-08 RX ADMIN — HEPARIN SODIUM 16 UNITS/KG/HR: 10000 INJECTION, SOLUTION INTRAVENOUS at 12:09

## 2025-02-08 RX ADMIN — INSULIN ASPART 25 UNITS: 100 INJECTION, SUSPENSION SUBCUTANEOUS at 08:16

## 2025-02-08 RX ADMIN — GABAPENTIN 100 MG: 100 CAPSULE ORAL at 08:10

## 2025-02-08 RX ADMIN — INSULIN LISPRO 1 UNITS: 100 INJECTION, SOLUTION INTRAVENOUS; SUBCUTANEOUS at 12:08

## 2025-02-08 RX ADMIN — TICAGRELOR 90 MG: 90 TABLET ORAL at 21:31

## 2025-02-08 RX ADMIN — ACETAMINOPHEN 650 MG: 325 TABLET, FILM COATED ORAL at 21:37

## 2025-02-08 RX ADMIN — ASPIRIN 81 MG: 81 TABLET, COATED ORAL at 08:11

## 2025-02-08 RX ADMIN — INSULIN LISPRO 3 UNITS: 100 INJECTION, SOLUTION INTRAVENOUS; SUBCUTANEOUS at 08:10

## 2025-02-08 RX ADMIN — GABAPENTIN 100 MG: 100 CAPSULE ORAL at 21:31

## 2025-02-08 RX ADMIN — EZETIMIBE 10 MG: 10 TABLET ORAL at 08:11

## 2025-02-08 RX ADMIN — TICAGRELOR 90 MG: 90 TABLET ORAL at 08:12

## 2025-02-08 RX ADMIN — GABAPENTIN 100 MG: 100 CAPSULE ORAL at 17:19

## 2025-02-08 RX ADMIN — PANTOPRAZOLE SODIUM 40 MG: 40 TABLET, DELAYED RELEASE ORAL at 08:10

## 2025-02-08 RX ADMIN — ATORVASTATIN CALCIUM 80 MG: 80 TABLET, FILM COATED ORAL at 17:20

## 2025-02-08 RX ADMIN — CLONIDINE HYDROCHLORIDE 0.2 MG: 0.2 TABLET ORAL at 08:10

## 2025-02-08 NOTE — ASSESSMENT & PLAN NOTE
Lab Results   Component Value Date    HGBA1C 11.4 (A) 12/17/2024       Recent Labs     02/07/25  1027 02/07/25  2152 02/08/25  0750 02/08/25  1116   POCGLU 198* 222* 279* 170*     Uncontrolled as evidenced by A1c above  Home regimen includes NovoLog 70/30 50 units twice daily, Mounjaro 7.5 subQ weekly, and Jardiance 25 daily  On admission, 70/30 was decreased to 25 units twice daily and SSI TID AC and QHS  Continue for now and adjust as indicated   Consistent carb diet

## 2025-02-08 NOTE — ASSESSMENT & PLAN NOTE
Lab Results   Component Value Date    EGFR 78 02/07/2025    EGFR 84 08/21/2024    EGFR 83 07/01/2024    CREATININE 0.94 02/07/2025    CREATININE 0.88 08/21/2024    CREATININE 0.89 07/01/2024   Monitor BMP.

## 2025-02-08 NOTE — ASSESSMENT & PLAN NOTE
Lab Results   Component Value Date    HGBA1C 11.4 (A) 12/17/2024       Recent Labs     02/07/25  1027 02/07/25  2152   POCGLU 198* 222*       Blood Sugar Average: Last 72 hrs:

## 2025-02-08 NOTE — CONSULTS
Cardiology Consult H&P  Wagner Ochoa 74 y.o. male MRN: [id number]  Unit/Bed#: [facility] 405-01 Encounter: [id number]  Physician Requesting Consult: Song Lucero DO  Reason for Consult: Myocardial infarction    HPI  74 y.o. male with a history of hypertension, hyperlipidemia, DM2 and CKD2 who presented to the ED at [facility] complaining of chest pain.  The chest pain had initially began 3 days prior.  At first he thought it was related to heartburn but it persisted.  It was unrelated to exertion and was worse in the evenings.  He does report some radiation to the neck.  Otherwise he had no other symptoms.  He decided to come to the ED on 2/7 and was noted to have a troponin >23,000 x3 and ECG findings concerning for ischemic changes.  The case was discussed with interventional cardiology who recommended medical therapy and transfer to [facility] for cardiac catheterization.    Today the patient was seen and examined at bedside.  Overall he reports feeling well.  He does report occasional mild neck pain but otherwise had no other acute complaints.  He reports that his chest pain has completely resolved.  Telemetry shows normal sinus rhythm.  Assessment & Plan  Chest pain  The patient presents to the ED complaining of 3 days of persistent chest pain.  Troponin was elevated >23,000 x3 and ECG showed ST depressions in V1-V4 and mild ST elevations in V5/V6.  The case was discussed with interventional cardiology who recommended medical management and transfer to [facility] for cardiac catheterization.  The patient's presentation likely represents a completed myocardial infarction.    Plan:  Continue heparin drip.  Continue aspirin and Brilinta.  Continue Lipitor.  Continue amlodipine.  Consider adding a beta-blocker if BP allows.  Follow-up echocardiogram.  Telemetry monitoring.  Plan for cardiac catheterization on Monday.  N.p.o. at midnight the night prior.  Mixed hyperlipidemia  Continue statin.  Essential  "hypertension  Continue home amlodipine, losartan and clonidine.  Consider switching clonidine to an alternative agent.   CKD (chronic kidney disease) stage 2, GFR 60-89 ml/min  Lab Results   Component Value Date    EGFR 78 02/07/2025    EGFR 84 08/21/2024    EGFR 83 07/01/2024    CREATININE 0.94 02/07/2025    CREATININE 0.88 08/21/2024    CREATININE 0.89 07/01/2024   Monitor BMP.  Gastroesophageal reflux disease  Continue Protonix.  Type 2 diabetes mellitus with hyperglycemia, with long-term current use of insulin ([facility])  Lab Results   Component Value Date    HGBA1C 11.4 (A) 12/17/2024       Recent Labs     02/07/25  1027 02/07/25  2152   POCGLU 198* 222*       Blood Sugar Average: Last 72 hrs:    Chronic midline low back pain with right-sided sciatica    Peripheral polyneuropathy          Intake/Output Summary (Last 24 hours) at 2/7/2025 2300  Last data filed at 2/7/2025 1948  Gross per 24 hour   Intake --   Output 200 ml   Net -200 ml     Case was discussed with Dr. Whiteside who is in agreement.    Alo Clinton MD  -PGY-5 Cardiology Fellow  -[place] text enabled      ======================================================    Physical exam  Vitals: Blood pressure 103/56, pulse 59, temperature 100 °F (37.8 °C), resp. rate 16, height 5' 7\" (1.702 m), weight 75.5 kg (166 lb 7.2 oz), SpO2 96%.  Gen: Well appearing  Psych: AOx3  Skin: Intact  Neck: Supple  Cardiac: S1, S2, regular rate, no S3 or S4 appreciated. No murmurs. +2 PT, radial pulses. No peripheral edema. No carotid bruits.  Resp: CTABL. No crackles  Abd: Nontender, nondistended  Rheum: No joint deformities in UE or LE    ======================================================    TREADMILL STRESS  No results found for this or any previous visit.     ----------------------------------------------------------------------------------------------  NUCLEAR STRESS TEST: No results found for this or any previous visit.    No results found for this or any previous " visit.      --------------------------------------------------------------------------------  CATH:  No results found for this or any previous visit.    --------------------------------------------------------------------------------  ECHO:   No results found for this or any previous visit.    No results found for this or any previous visit.    --------------------------------------------------------------------------------  HOLTER  No results found for this or any previous visit.    --------------------------------------------------------------------------------  CAROTIDS  No results found for this or any previous visit.       [unfilled]   ======================================================      Review of Systems   Constitutional:  Negative for activity change, appetite change, chills, fatigue and fever.   HENT:  Negative for congestion, ear discharge, ear pain, hearing loss, sinus pain, sore throat, tinnitus and trouble swallowing.    Eyes:  Negative for pain and visual disturbance.   Respiratory:  Negative for cough, chest tightness, shortness of breath and wheezing.    Cardiovascular:  Negative for chest pain and leg swelling.   Gastrointestinal:  Negative for abdominal distention, abdominal pain, anal bleeding and blood in stool.   Endocrine: Negative for cold intolerance and heat intolerance.   Genitourinary:  Negative for difficulty urinating, dysuria and frequency.   Musculoskeletal:  Negative for arthralgias and myalgias.   Skin:  Negative for rash.   Allergic/Immunologic: Negative for environmental allergies and food allergies.   Neurological:  Negative for dizziness, light-headedness, numbness and headaches.   Hematological:  Negative for adenopathy.   Psychiatric/Behavioral:  Negative for agitation, behavioral problems and confusion.      ROS as noted above, otherwise 12 point review of systems was performed and is negative.     Historical Information   Past Medical History:   Diagnosis Date     Diabetes mellitus (HCC)      Past Surgical History:   Procedure Laterality Date    APPENDECTOMY Right 1970    PARAMEDIAN INCISION     Social History     Substance and Sexual Activity   Alcohol Use Never     Social History     Substance and Sexual Activity   Drug Use Never     Social History     Tobacco Use   Smoking Status Former    Types: Cigarettes   Smokeless Tobacco Former   Tobacco Comments    Quit 2001     Family History: No family history on file.    Meds/Allergies   Hospital Medications:   Current Facility-Administered Medications:     albuterol (PROVENTIL HFA,VENTOLIN HFA) inhaler 2 puff, Q4H PRN    [START ON 2/8/2025] amLODIPine (NORVASC) tablet 10 mg, Daily    [START ON 2/8/2025] aspirin (ECOTRIN LOW STRENGTH) EC tablet 81 mg, Daily    atorvastatin (LIPITOR) tablet 80 mg, Daily With Dinner    cloNIDine (CATAPRES) tablet 0.2 mg, BID    [START ON 2/8/2025] ezetimibe (ZETIA) tablet 10 mg, Daily    gabapentin (NEURONTIN) capsule 100 mg, TID    heparin (porcine) 25,000 units in 0.45% NaCl 250 mL infusion (premix), Titrated, Last Rate: 12 Units/kg/hr (02/07/25 2032)    heparin (porcine) injection 2,000 Units, Q6H PRN    heparin (porcine) injection 4,000 Units, Q6H PRN    [START ON 2/8/2025] insulin aspart protamine-insulin aspart (NovoLOG 70/30) 100 units/mL subcutaneous injection 25 Units, BID AC    [START ON 2/8/2025] insulin lispro (HumALOG/ADMELOG) 100 units/mL subcutaneous injection 1-6 Units, TID AC **AND** [START ON 2/8/2025] Fingerstick Glucose (POCT), TID AC    insulin lispro (HumALOG/ADMELOG) 100 units/mL subcutaneous injection 1-6 Units, HS    [START ON 2/8/2025] losartan (COZAAR) tablet 100 mg, Daily    [START ON 2/8/2025] pantoprazole (PROTONIX) EC tablet 40 mg, Daily    terazosin (HYTRIN) capsule 10 mg, HS    ticagrelor (BRILINTA) tablet 90 mg, Q12H VINAYAK    traMADol (ULTRAM) tablet 50 mg, Q12H PRN  Home Medications:   Medications Prior to Admission:     albuterol (PROVENTIL HFA,VENTOLIN HFA) 90  "mcg/act inhaler    amLODIPine (NORVASC) 10 mg tablet    ammonium lactate (LAC-HYDRIN) 12 % cream    aspirin (ECOTRIN LOW STRENGTH) 81 mg EC tablet    Aspirin Low Dose 81 MG EC tablet    atorvastatin (LIPITOR) 80 mg tablet    Blood Glucose Monitoring Suppl (OneTouch Verio Reflect) w/Device KIT    carbamide peroxide (DEBROX) 6.5 % otic solution    cloNIDine (CATAPRES) 0.2 mg tablet    Continuous Blood Gluc Sensor (FreeStyle Lucas 14 Day Sensor) MISC    Continuous Blood Gluc Sensor (FreeStyle Lucas 14 Day Sensor) MISC    EASY COMFORT PEN NEEDLES 32G X 4 MM MISC    Empagliflozin 25 MG TABS    ergocalciferol (VITAMIN D2) 50,000 units    Evolocumab 140 MG/ML SOAJ    ezetimibe (ZETIA) 10 mg tablet    gabapentin (NEURONTIN) 300 mg capsule    glucose blood (FREESTYLE LITE) test strip    glucose blood (OneTouch Verio) test strip    ibuprofen (MOTRIN) 800 mg tablet    insulin aspart protamine-insulin aspart (NovoLOG Mix 70/30 FlexPen) 100 Units/mL injection pen    Insulin Pen Needle 32G X 4 MM MISC    losartan (COZAAR) 100 MG tablet    metFORMIN (GLUCOPHAGE-XR) 750 mg 24 hr tablet    Multiple Vitamins-Minerals (Vitramyn) TABS    Narcan 4 MG/0.1ML nasal spray    OneTouch Delica Lancets 33G MISC    pantoprazole (PROTONIX) 40 mg tablet    SURE COMFORT LANCETS 30G MISC    tadalafil (CIALIS) 10 MG tablet    terazosin (HYTRIN) 10 MG capsule    Tirzepatide (Mounjaro) 10 MG/0.5ML SOAJ    traMADol (ULTRAM) 50 mg tablet    triamcinolone (KENALOG) 0.1 % cream    UltiCare Alcohol Swabs 70 % PADS    Allergies   Allergen Reactions    Iodinated Contrast Media Other (See Comments)       Objective   Vitals: Blood pressure 103/56, pulse 59, temperature 100 °F (37.8 °C), resp. rate 16, height 5' 7\" (1.702 m), weight 75.5 kg (166 lb 7.2 oz), SpO2 96%.  Orthostatic Blood Pressures      Flowsheet Row Most Recent Value   Blood Pressure 103/56 filed at 02/07/2025 2250              Intake/Output Summary (Last 24 hours) at 2/7/2025 2300  Last data " filed at 2/7/2025 1948  Gross per 24 hour   Intake --   Output 200 ml   Net -200 ml       Invasive Devices       Peripheral Intravenous Line  Duration             Peripheral IV 02/07/25 Left Antecubital <1 day    Peripheral IV 02/07/25 Right Antecubital <1 day                      Lab Results: I have personally reviewed pertinent lab results.    Results from last 7 days   Lab Units 02/07/25  1016   WBC Thousand/uL 7.72   HEMOGLOBIN g/dL 13.9   HEMATOCRIT % 42.7   PLATELETS Thousands/uL 168     Results from last 7 days   Lab Units 02/07/25  1016   POTASSIUM mmol/L 5.2   CHLORIDE mmol/L 99   CO2 mmol/L 32   BUN mg/dL 18   CREATININE mg/dL 0.94   CALCIUM mg/dL 10.3*     Results from last 7 days   Lab Units 02/07/25  1646 02/07/25  1025   INR   --  0.96   PTT seconds 69* 25

## 2025-02-08 NOTE — PLAN OF CARE
Problem: PAIN - ADULT  Goal: Verbalizes/displays adequate comfort level or baseline comfort level  Description: Interventions:  - Encourage patient to monitor pain and request assistance  - Assess pain using appropriate pain scale  - Administer analgesics based on type and severity of pain and evaluate response  - Implement non-pharmacological measures as appropriate and evaluate response  - Consider cultural and social influences on pain and pain management  - Notify physician/advanced practitioner if interventions unsuccessful or patient reports new pain  Outcome: Progressing     Problem: INFECTION - ADULT  Goal: Absence or prevention of progression during hospitalization  Description: INTERVENTIONS:  - Assess and monitor for signs and symptoms of infection  - Monitor lab/diagnostic results  - Monitor all insertion sites, i.e. indwelling lines, tubes, and drains  - Monitor endotracheal if appropriate and nasal secretions for changes in amount and color  - [place] appropriate cooling/warming therapies per order  - Administer medications as ordered  - Instruct and encourage patient and family to use good hand hygiene technique  - Identify and instruct in appropriate isolation precautions for identified infection/condition  Outcome: Progressing

## 2025-02-08 NOTE — ASSESSMENT & PLAN NOTE
Lab Results   Component Value Date    HGBA1C 11.4 (A) 12/17/2024       Recent Labs     02/07/25  1027   POCGLU 198*     Home regimen includes NovoLog 70/30 50 units twice daily, Mounjaro 7.5 subQ weekly, and Jardiance 25 daily  While hospitalized, will reduce NovoLog to 25 units twice daily and order ISS q. Meal  Consistent carb diet, patient n.p.o. at midnight

## 2025-02-08 NOTE — ASSESSMENT & PLAN NOTE
Lab Results   Component Value Date    EGFR 81 02/08/2025    EGFR 78 02/07/2025    EGFR 84 08/21/2024    CREATININE 0.91 02/08/2025    CREATININE 0.94 02/07/2025    CREATININE 0.88 08/21/2024   Renal function appears within baseline, continue to monitor  Note plan for cath Monday

## 2025-02-08 NOTE — ASSESSMENT & PLAN NOTE
Resume Norvasc 10 mg daily, clonidine 0.2 mg twice daily, losartan 100 mg daily, and Urias on 10 mg daily with hold parameters

## 2025-02-08 NOTE — ASSESSMENT & PLAN NOTE
Patient with history of hypertension, hyperlipidemia, DM 2.  Comes to the ED after experiencing chest pain for about 3 days.  Cannot reliably state if it is better or worse with exertion, but he has no active chest pain at this time  EKG with ST depressions in V1 and V2, possibly some moderately elevated ST segments in V3 through V6  Notably, troponin level 22973 x3.  [place] ED discussed with interventional cardiology who recommended medical therapy with Brilinta, aspirin, and heparin infusion.  Recommend patient be transferred to B for interventional evaluation  At present patient resting comfortably and is hemodynamically stable.  Resume medical management and monitor on telemetry  Cardiology consulted and we appreciate their expertise  Patient will be n.p.o. at midnight

## 2025-02-08 NOTE — ASSESSMENT & PLAN NOTE
The patient presents to the ED complaining of 3 days of persistent chest pain.  Troponin was elevated >23,000 x3 and ECG showed ST depressions in V1-V4 and mild ST elevations in V5/V6.  The case was discussed with interventional cardiology who recommended medical management and transfer to \[facility]\"" for cardiac catheterization.  The patient's presentation likely represents a completed myocardial infarction.    Plan:  Continue heparin drip.  Continue aspirin and Brilinta.  Continue Lipitor.  Continue amlodipine.  Consider adding a beta-blocker if BP allows.  Follow-up echocardiogram.  Telemetry monitoring.  Plan for cardiac catheterization on Monday.  N.p.o. at midnight the night prior.

## 2025-02-08 NOTE — ASSESSMENT & PLAN NOTE
Continue Norvasc 10 mg daily, clonidine 0.2 mg twice daily, losartan 100 mg daily, and terazosin 10 mg daily with hold parameters

## 2025-02-08 NOTE — ASSESSMENT & PLAN NOTE
Patient with history of hypertension, hyperlipidemia, DM 2.  Presented to the ED after experiencing chest pain for about 3 days.   Patient could not reliably state if it was better or worse with exertion, but he continues to have no active chest pain at this time  EKG with ST depressions in V1 and V2, possibly some moderately elevated ST segments in V3 through V6  Notably, troponin level >22,973 x3.  [place] ED discussed with interventional cardiology who recommended medical therapy with Brilinta, aspirin, and heparin infusion.  Patient was transferred to [facility] for interventional evaluation - plan for cath Monday 2/10/25  Cardiology following, appreciate their ongoing recs  Tele

## 2025-02-08 NOTE — ASSESSMENT & PLAN NOTE
Continue home amlodipine, losartan and clonidine.  Consider switching clonidine to an alternative agent.

## 2025-02-08 NOTE — H&P
H&P - Hospitalist   Name: Wagner Ochoa 74 y.o. male I MRN: [id number]  [facility] I Date of Admission: 2/7/2025   Date of Service: 2/7/2025 I Hospital Day: 0     Assessment & Plan  Mixed hyperlipidemia  Resume atorvastatin 80 mg nightly and Zetia 10 mg daily  Gastroesophageal reflux disease  Protonix 40 mg daily ordered  Essential hypertension  Resume Norvasc 10 mg daily, clonidine 0.2 mg twice daily, losartan 100 mg daily, and Urias on 10 mg daily with hold parameters  Chronic midline low back pain with right-sided sciatica  Resume tramadol 50 mg daily  CKD (chronic kidney disease) stage 2, GFR 60-89 ml/min  Lab Results   Component Value Date    EGFR 78 02/07/2025    EGFR 84 08/21/2024    EGFR 83 07/01/2024    CREATININE 0.94 02/07/2025    CREATININE 0.88 08/21/2024    CREATININE 0.89 07/01/2024   Renal function appears within baseline, continue to monitor  Peripheral polyneuropathy  Resume gabapentin 100 mg p.o. 3 times daily  Type 2 diabetes mellitus with hyperglycemia, with long-term current use of insulin ([facility])  Lab Results   Component Value Date    HGBA1C 11.4 (A) 12/17/2024       Recent Labs     02/07/25  1027   POCGLU 198*     Home regimen includes NovoLog 70/30 50 units twice daily, Mounjaro 7.5 subQ weekly, and Jardiance 25 daily  While hospitalized, will reduce NovoLog to 25 units twice daily and order ISS q. Meal  Consistent carb diet, patient n.p.o. at midnight  Chest pain  Patient with history of hypertension, hyperlipidemia, DM 2.  Comes to the ED after experiencing chest pain for about 3 days.  Cannot reliably state if it is better or worse with exertion, but he has no active chest pain at this time  EKG with ST depressions in V1 and V2, possibly some moderately elevated ST segments in V3 through V6  Notably, troponin level 22973 x3.  [place] ED discussed with interventional cardiology who recommended medical therapy with Brilinta, aspirin, and heparin infusion.  Recommend  patient be transferred to [facility] for interventional evaluation  At present patient resting comfortably and is hemodynamically stable.  Resume medical management and monitor on telemetry  Cardiology consulted and we appreciate their expertise  Patient will be n.p.o. at midnight    VTE Pharmacologic Prophylaxis:   Moderate Risk (Score 3-4) - Pharmacological DVT Prophylaxis Ordered: heparin drip.  Code Status: Level 1 - Full Code   Discussion with family: Patient declined call to .     Anticipated Length of Stay: Patient will be admitted on an inpatient basis with an anticipated length of stay of greater than 2 midnights secondary to MI.    History of Present Illness   Chief Complaint: Chest pain    Wagner Ochoa is a 74 y.o. male with a PMH of hyperlipidemia, insulin-dependent diabetes, CKD, chronic back pain, and others who presents with chest pain.  He tells me that he had been in a normal state of health recently but over the past 3 days or so he is noted occasional chest pain radiating to his neck.  It does not reliably occur with exertion or resolve with rest, but he tells me he has no active chest pain at present.  He decided to come to the ED after experiencing some chest discomfort today and was found to have markedly elevated troponin with concerning ST-T wave changes on EKG.  Cardiology was contacted who recommended transfer to [facility] for interventional evaluation.  Patient is seen and examined at bedside on P4, he denies active chest pain and states he feels well presently.    Review of Systems   Constitutional:  Negative for chills and fever.   HENT:  Negative for ear pain and sore throat.    Eyes:  Negative for visual disturbance.   Respiratory:  Negative for cough and shortness of breath.    Cardiovascular:  Positive for chest pain. Negative for palpitations.   Gastrointestinal:  Negative for abdominal pain and vomiting.   Genitourinary:  Negative for dysuria and hematuria.   Musculoskeletal:   Negative for arthralgias and back pain.   Skin:  Negative for color change and rash.   Neurological:  Negative for syncope.   All other systems reviewed and are negative.      Historical Information   Past Medical History:   Diagnosis Date    Diabetes mellitus (HCC)      Past Surgical History:   Procedure Laterality Date    APPENDECTOMY Right 1970    PARAMEDIAN INCISION     Social History     Tobacco Use    Smoking status: Former     Types: Cigarettes    Smokeless tobacco: Former    Tobacco comments:     Quit 2001   Vaping Use    Vaping status: Never Used   Substance and Sexual Activity    Alcohol use: Never    Drug use: Never    Sexual activity: Not on file     E-Cigarette/Vaping    E-Cigarette Use Never User      E-Cigarette/Vaping Substances    Nicotine No     THC No     CBD No     Flavoring No     Other No     Unknown No      No family history on file.  Social History:  Marital Status: /Civil Union   Occupation: Not on file  Patient Pre-hospital Living Situation: Home  Patient Pre-hospital Level of Mobility: walks  Patient Pre-hospital Diet Restrictions: N/A    Meds/Allergies   I have reviewed home medications using recent Epic encounter.  Prior to Admission medications    Medication Sig Start Date End Date Taking? Authorizing Provider   albuterol (PROVENTIL HFA,VENTOLIN HFA) 90 mcg/act inhaler INHALE 2 PUFFS EVERY 6 HOURS AS NEEDED FOR WHEEZING Strength: 108 (90 Base) MCG/ACT 8/30/24   Ricardo Presley MD   amLODIPine (NORVASC) 10 mg tablet TAKE ONE TABLET BY MOUTH ONCE DAILY 5/13/24   Ricardo Presley MD   ammonium lactate (LAC-HYDRIN) 12 % cream APPLY TOPICALLY TO AFFECTED AREA ONCE DAILY AS NEEDED FOR DRY SKIN 8/10/21   Fatoumata Mcelroy MD   aspirin (ECOTRIN LOW STRENGTH) 81 mg EC tablet Take 81 mg by mouth daily 4/14/21   Historical Provider, MD   Aspirin Low Dose 81 MG EC tablet TAKE ONE TABLET BY MOUTH DAILY 8/10/20   Emely Trujillo MD   atorvastatin (LIPITOR) 80 mg tablet TAKE  ONE TABLET BY MOUTH ONCE DAILY WITH DINNER 2/12/24   Ricardo Presley MD   Blood Glucose Monitoring Suppl (OneTouch Verio Reflect) w/Device KIT Check blood sugars three times daily. Please substitute with appropriate alternative as covered by patient's insurance. Dx: E11.65 1/16/25   Ricardo Presley MD   carbamide peroxide (DEBROX) 6.5 % otic solution Administer 5 drops into ears 2 (two) times a day for 4 days 5/2/24 1/15/25  Gertrudis Odom MD   cloNIDine (CATAPRES) 0.2 mg tablet Take 0.2 mg by mouth 2 (two) times a day 8/7/24 8/7/25  Historical Provider, MD   Continuous Blood Gluc Sensor (FreeStyle Lucas 14 Day Sensor) MISC  11/3/20   Historical Provider, MD   Continuous Blood Gluc Sensor (FreeStyle Lucas 14 Day Sensor) MISC Use 1 each every 14 (fourteen) days 7/1/21   Historical Provider, MD   EASY COMFORT PEN NEEDLES 32G X 4 MM MISC  6/12/20   Historical Provider, MD   Empagliflozin 25 MG TABS Take 1 tablet (25 mg total) by mouth in the morning 5/26/23   Ricardo Presley MD   ergocalciferol (VITAMIN D2) 50,000 units Take 1 capsule weekly x8 weeks, then 2000 units p.o. Daily. 10/19/21   Fatoumata Mcelroy MD   Evolocumab 140 MG/ML SOAJ Inject 1 mL (140 mg total) under the skin every 14 (fourteen) days 12/17/24   Ricardo Presley MD   ezetimibe (ZETIA) 10 mg tablet TAKE ONE TABLET BY MOUTH ONCE DAILY 1/10/25   Ricardo Presley MD   gabapentin (NEURONTIN) 300 mg capsule TAKE ONE CAPSULE BY MOUTH THREE TIMES A DAY .START WITH ONCE DAILY .INCREASE TO TWICE A DAY FOR 7 DAY .THEN CONTINUE THREE TIMES A DAY 11/8/24   Ricardo Presley MD   glucose blood (FREESTYLE LITE) test strip Use 1 each 3 (three) times a day Use as instructed 10/18/24   Ricardo Presley MD   glucose blood (OneTouch Verio) test strip Check blood sugars three times daily. Please substitute with appropriate alternative as covered by patient's insurance. Dx: E11.65 1/16/25    Ricardo Presley MD   ibuprofen (MOTRIN) 800 mg tablet  2/24/21   Historical Provider, MD   insulin aspart protamine-insulin aspart (NovoLOG Mix 70/30 FlexPen) 100 Units/mL injection pen Inject 40 units subcutaneously twice a day before meals 9/29/20   Historical Provider, MD   Insulin Pen Needle 32G X 4 MM MISC Use with flexpens BID 7/1/21   Historical Provider, MD   losartan (COZAAR) 100 MG tablet Take 1 tablet (100 mg total) by mouth daily 5/26/23   Ricardo Presley MD   metFORMIN (GLUCOPHAGE-XR) 750 mg 24 hr tablet TAKE ONE TABLET BY MOUTH ONCE DAILY WITH BREAKFAST 11/8/24   Ricardo Presley MD   Multiple Vitamins-Minerals (Vitramyn) TABS TAKE ONE TABLET BY MOUTH ONCE DAILY 6/19/24   Ricardo Presley MD   Narcan 4 MG/0.1ML nasal spray USE ONE SPRAY IN ONE NOSTRIL FOR ONE DOSE. IF THE DESIRED RESPONSE IS NOT OBTAINED AFTER 2-3 MINUTES, ADMINISTER ADDITIONAL DOSE IN OPPOSITE NOSTRIL USING NEW SPRAY 12/13/22   Ricardo Presley MD   OneTouch Delica Lancets 33G MISC Check blood sugars three times daily. Please substitute with appropriate alternative as covered by patient's insurance. Dx: E11.65 1/16/25   Ricardo Presley MD   pantoprazole (PROTONIX) 40 mg tablet Take 1 tablet (40 mg total) by mouth daily 5/26/23   Ricardo Presley MD   SURE COMFORT LANCETS 30G MISC use as directed 1/28/14   Historical Provider, MD   tadalafil (CIALIS) 10 MG tablet Take 10 mg by mouth 4/30/21   Historical Provider, MD   terazosin (HYTRIN) 10 MG capsule Take 10 mg by mouth 7/1/21 1/15/25  Historical Provider, MD   Tirzepatide (Mounjaro) 10 MG/0.5ML SOAJ Inject 10 mg under the skin once a week 1/15/25   Ricardo Presley MD   traMADol (ULTRAM) 50 mg tablet Take 1 tablet (50 mg total) by mouth every 12 (twelve) hours as needed for moderate pain 1/15/25   Ricardo Presley MD   triamcinolone (KENALOG) 0.1 % cream APPLY TOPICALLY TO AFFECTED AREA TWICE  A DAY AS NEEDED FOR RASH 8/6/22   Ricardo Presley MD   UltiCare Alcohol Swabs 70 % PADS USE TO CLEAN THE AREAS BEFORE TESTING BLOOD SUGAR OR/AND INJECTING INSULIN ONCE DAILY 6/21/24   Ricardo Presley MD     Allergies   Allergen Reactions    Iodinated Contrast Media Other (See Comments)       Objective :  Temp:  [97.6 °F (36.4 °C)-99.3 °F (37.4 °C)] 99.3 °F (37.4 °C)  HR:  [57-69] 69  BP: (100-156)/(48-98) 131/98  Resp:  [16-19] 16  SpO2:  [95 %-100 %] 98 %  O2 Device: None (Room air)    Physical Exam  Vitals and nursing note reviewed.   Constitutional:       General: He is not in acute distress.     Appearance: He is well-developed.   HENT:      Head: Normocephalic and atraumatic.   Eyes:      Conjunctiva/sclera: Conjunctivae normal.   Cardiovascular:      Rate and Rhythm: Normal rate and regular rhythm.      Heart sounds: No murmur heard.  Pulmonary:      Effort: Pulmonary effort is normal. No respiratory distress.      Breath sounds: Normal breath sounds.   Abdominal:      Palpations: Abdomen is soft.      Tenderness: There is no abdominal tenderness.   Musculoskeletal:         General: No swelling.      Cervical back: Neck supple.   Skin:     General: Skin is warm and dry.      Capillary Refill: Capillary refill takes less than 2 seconds.   Neurological:      Mental Status: He is alert.   Psychiatric:         Mood and Affect: Mood normal.              Lab Results: I have reviewed the following results:  Results from last 7 days   Lab Units 02/07/25  1016   WBC Thousand/uL 7.72   HEMOGLOBIN g/dL 13.9   HEMATOCRIT % 42.7   PLATELETS Thousands/uL 168   SEGS PCT % 50   LYMPHO PCT % 39   MONO PCT % 9   EOS PCT % 1     Results from last 7 days   Lab Units 02/07/25  1016   SODIUM mmol/L 137   POTASSIUM mmol/L 5.2   CHLORIDE mmol/L 99   CO2 mmol/L 32   BUN mg/dL 18   CREATININE mg/dL 0.94   ANION GAP mmol/L 6   CALCIUM mg/dL 10.3*   ALBUMIN g/dL 4.1   TOTAL BILIRUBIN mg/dL 0.43   ALK PHOS U/L 69    ALT U/L 34   AST U/L 220*   GLUCOSE RANDOM mg/dL 206*     Results from last 7 days   Lab Units 02/07/25  1025   INR  0.96     Results from last 7 days   Lab Units 02/07/25  1027   POC GLUCOSE mg/dl 198*     Lab Results   Component Value Date    HGBA1C 11.4 (A) 12/17/2024    HGBA1C 12.6 (H) 08/21/2024    HGBA1C 14.7 (A) 06/17/2024           Imaging Results Review: I personally reviewed the following image studies in PACS and associated radiology reports: chest xray. My interpretation of the radiology images/reports is: No acute cardiopulmonary disease.  Other Study Results Review: EKG was reviewed.     Administrative Statements       ** Please Note: This note has been constructed using a voice recognition system. **

## 2025-02-08 NOTE — PROGRESS NOTES
Progress Note - Hospitalist   Name: Wagner Ochoa 74 y.o. male I MRN: [id number]  Unit/Bed#: [facility] 405-01 I Date of Admission: 2/7/2025   Date of Service: 2/8/2025 I Hospital Day: 1    Assessment & Plan  Chest pain  Patient with history of hypertension, hyperlipidemia, DM 2.  Presented to the ED after experiencing chest pain for about 3 days.   Patient could not reliably state if it was better or worse with exertion, but he continues to have no active chest pain at this time  EKG with ST depressions in V1 and V2, possibly some moderately elevated ST segments in V3 through V6  Notably, troponin level >22,973 x3.  [place] ED discussed with interventional cardiology who recommended medical therapy with Brilinta, aspirin, and heparin infusion.  Patient was transferred to [facility] for interventional evaluation - plan for cath Monday 2/10/25  Cardiology following, appreciate their ongoing recs  Tele  Mixed hyperlipidemia  Continue atorvastatin 80 mg nightly and Zetia 10 mg daily  Gastroesophageal reflux disease  Protonix 40 mg daily on board  Essential hypertension  Continue Norvasc 10 mg daily, clonidine 0.2 mg twice daily, losartan 100 mg daily, and terazosin 10 mg daily with hold parameters  Chronic midline low back pain with right-sided sciatica  Continue tramadol 50 mg daily  CKD (chronic kidney disease) stage 2, GFR 60-89 ml/min  Lab Results   Component Value Date    EGFR 81 02/08/2025    EGFR 78 02/07/2025    EGFR 84 08/21/2024    CREATININE 0.91 02/08/2025    CREATININE 0.94 02/07/2025    CREATININE 0.88 08/21/2024   Renal function appears within baseline, continue to monitor  Note plan for cath Monday  Peripheral polyneuropathy  Continue gabapentin 100 mg p.o. 3 times daily  Type 2 diabetes mellitus with hyperglycemia, with long-term current use of insulin ([facility])  Lab Results   Component Value Date    HGBA1C 11.4 (A) 12/17/2024       Recent Labs     02/07/25  1027 02/07/25  2152 02/08/25  0750 02/08/25  1116    POCGLU 198* 222* 279* 170*     Uncontrolled as evidenced by A1c above  Home regimen includes NovoLog 70/30 50 units twice daily, Mounjaro 7.5 subQ weekly, and Jardiance 25 daily  On admission, 70/30 was decreased to 25 units twice daily and SSI TID AC and QHS  Continue for now and adjust as indicated   Consistent carb diet    VTE Pharmacologic Prophylaxis:    heparin GTT as above    Mobility:   Basic Mobility Inpatient Raw Score: 24  JH-HLM Goal: 8: Walk 250 feet or more  JH-HLM Achieved: 2: Bed activities/Dependent transfer  JH-HLM Goal NOT achieved. Continue with multidisciplinary rounding and encourage appropriate mobility to improve upon JH-HLM goals.    Patient Centered Rounds: I performed bedside rounds with nursing staff today.   Discussions with Specialists or Other Care Team Provider: Cardiology AP    Education and Discussions with Family / Patient: Patient declined call to .     Current Length of Stay: 1 day(s)  Current Patient Status: Inpatient   Certification Statement: The patient will continue to require additional inpatient hospital stay due to cath Monday  Discharge Plan:  pending cath monday    Code Status: Level 1 - Full Code    Subjective   Mr. Ochoa reports neck discomfort and requested warm towel. Denies CP or SOB    Objective :  Temp:  [98.1 °F (36.7 °C)-100 °F (37.8 °C)] 98.1 °F (36.7 °C)  HR:  [57-69] 60  BP: ()/(41-98) 82/41  Resp:  [16-19] 18  SpO2:  [93 %-100 %] 97 %  O2 Device: None (Room air)    Body mass index is 26 kg/m².     Input and Output Summary (last 24 hours):     Intake/Output Summary (Last 24 hours) at 2/8/2025 1345  Last data filed at 2/8/2025 1300  Gross per 24 hour   Intake 225 ml   Output 300 ml   Net -75 ml       Physical Exam  Vitals reviewed.   Constitutional:       Comments: Patient seen sitting in bed, NAD, RN present   Cardiovascular:      Rate and Rhythm: Normal rate and regular rhythm.   Pulmonary:      Effort: Pulmonary effort is normal.  No respiratory distress.      Breath sounds: Normal breath sounds.   Abdominal:      General: Bowel sounds are normal.      Palpations: Abdomen is soft.      Tenderness: There is no abdominal tenderness.   Musculoskeletal:      Right lower leg: No edema.      Left lower leg: No edema.   Skin:     General: Skin is warm.   Neurological:      Mental Status: He is alert and oriented to person, place, and time.   Psychiatric:         Mood and Affect: Mood normal.           Lines/Drains:        Telemetry:  Telemetry Orders (From admission, onward)               24 Hour Telemetry Monitoring  Continuous x 24 Hours (Telem)        Expiring   Question:  Reason for 24 Hour Telemetry  Answer:  Patients with jace/reese/endocarditis; cardiac contusion                     Telemetry Reviewed: Normal Sinus Rhythm and Sinus Bradycardia  Indication for Continued Telemetry Use: Acute MI/Unstable Angina/Rule out ACS               Lab Results: I have reviewed the following results:   Results from last 7 days   Lab Units 02/08/25  0520 02/07/25  1016   WBC Thousand/uL 8.16 7.72   HEMOGLOBIN g/dL 12.3 13.9   HEMATOCRIT % 37.8 42.7   PLATELETS Thousands/uL 155 168   SEGS PCT %  --  50   LYMPHO PCT %  --  39   MONO PCT %  --  9   EOS PCT %  --  1     Results from last 7 days   Lab Units 02/08/25  0520 02/07/25  1016   SODIUM mmol/L 136 137   POTASSIUM mmol/L 5.2 5.2   CHLORIDE mmol/L 104 99   CO2 mmol/L 25 32   BUN mg/dL 20 18   CREATININE mg/dL 0.91 0.94   ANION GAP mmol/L 7 6   CALCIUM mg/dL 9.2 10.3*   ALBUMIN g/dL  --  4.1   TOTAL BILIRUBIN mg/dL  --  0.43   ALK PHOS U/L  --  69   ALT U/L  --  34   AST U/L  --  220*   GLUCOSE RANDOM mg/dL 264* 206*     Results from last 7 days   Lab Units 02/07/25  1025   INR  0.96     Results from last 7 days   Lab Units 02/08/25  1116 02/08/25  0750 02/07/25  2152 02/07/25  1027   POC GLUCOSE mg/dl 170* 279* 222* 198*               Recent Cultures (last 7 days):         Imaging Results Review: I reviewed  radiology reports from this admission including: chest xray.      Last 24 Hours Medication List:     Current Facility-Administered Medications:     albuterol (PROVENTIL HFA,VENTOLIN HFA) inhaler 2 puff, Q4H PRN    amLODIPine (NORVASC) tablet 10 mg, Daily    aspirin (ECOTRIN LOW STRENGTH) EC tablet 81 mg, Daily    atorvastatin (LIPITOR) tablet 80 mg, Daily With Dinner    cloNIDine (CATAPRES) tablet 0.2 mg, BID    ezetimibe (ZETIA) tablet 10 mg, Daily    gabapentin (NEURONTIN) capsule 100 mg, TID    heparin (porcine) 25,000 units in 0.45% NaCl 250 mL infusion (premix), Titrated, Last Rate: 16 Units/kg/hr (02/08/25 1209)    heparin (porcine) injection 2,000 Units, Q6H PRN    heparin (porcine) injection 4,000 Units, Q6H PRN    insulin aspart protamine-insulin aspart (NovoLOG 70/30) 100 units/mL subcutaneous injection 25 Units, BID AC    insulin lispro (HumALOG/ADMELOG) 100 units/mL subcutaneous injection 1-6 Units, TID AC **AND** Fingerstick Glucose (POCT), TID AC    insulin lispro (HumALOG/ADMELOG) 100 units/mL subcutaneous injection 1-6 Units, HS    losartan (COZAAR) tablet 100 mg, Daily    pantoprazole (PROTONIX) EC tablet 40 mg, Daily    terazosin (HYTRIN) capsule 10 mg, HS    ticagrelor (BRILINTA) tablet 90 mg, Q12H VINAYAK    traMADol (ULTRAM) tablet 50 mg, Q12H PRN    Administrative Statements   Today, Patient Was Seen By: Mason Pena PA-C      **Please Note: This note may have been constructed using a voice recognition system.**

## 2025-02-08 NOTE — ASSESSMENT & PLAN NOTE
Lab Results   Component Value Date    EGFR 78 02/07/2025    EGFR 84 08/21/2024    EGFR 83 07/01/2024    CREATININE 0.94 02/07/2025    CREATININE 0.88 08/21/2024    CREATININE 0.89 07/01/2024   Renal function appears within baseline, continue to monitor

## 2025-02-09 PROBLEM — R50.9 FEVER: Status: ACTIVE | Noted: 2025-02-09

## 2025-02-09 LAB
ANION GAP SERPL CALCULATED.3IONS-SCNC: 7 MMOL/L (ref 4–13)
APTT PPP: 86 SECONDS (ref 23–34)
ATRIAL RATE: 64 BPM
BUN SERPL-MCNC: 27 MG/DL (ref 5–25)
CALCIUM SERPL-MCNC: 8.7 MG/DL (ref 8.4–10.2)
CHLORIDE SERPL-SCNC: 102 MMOL/L (ref 96–108)
CO2 SERPL-SCNC: 23 MMOL/L (ref 21–32)
CREAT SERPL-MCNC: 0.99 MG/DL (ref 0.6–1.3)
ERYTHROCYTE [DISTWIDTH] IN BLOOD BY AUTOMATED COUNT: 12.3 % (ref 11.6–15.1)
GFR SERPL CREATININE-BSD FRML MDRD: 73 ML/MIN/1.73SQ M
GLUCOSE SERPL-MCNC: 108 MG/DL (ref 65–140)
GLUCOSE SERPL-MCNC: 136 MG/DL (ref 65–140)
GLUCOSE SERPL-MCNC: 169 MG/DL (ref 65–140)
GLUCOSE SERPL-MCNC: 193 MG/DL (ref 65–140)
GLUCOSE SERPL-MCNC: 208 MG/DL (ref 65–140)
GLUCOSE SERPL-MCNC: 62 MG/DL (ref 65–140)
HCT VFR BLD AUTO: 33 % (ref 36.5–49.3)
HGB BLD-MCNC: 10.7 G/DL (ref 12–17)
MCH RBC QN AUTO: 29.6 PG (ref 26.8–34.3)
MCHC RBC AUTO-ENTMCNC: 32.4 G/DL (ref 31.4–37.4)
MCV RBC AUTO: 91 FL (ref 82–98)
P AXIS: 1 DEGREES
PLATELET # BLD AUTO: 150 THOUSANDS/UL (ref 149–390)
PMV BLD AUTO: 11.5 FL (ref 8.9–12.7)
POTASSIUM SERPL-SCNC: 4.3 MMOL/L (ref 3.5–5.3)
PR INTERVAL: 178 MS
PROCALCITONIN SERPL-MCNC: 0.17 NG/ML
QRS AXIS: -40 DEGREES
QRSD INTERVAL: 70 MS
QT INTERVAL: 406 MS
QTC INTERVAL: 419 MS
RBC # BLD AUTO: 3.62 MILLION/UL (ref 3.88–5.62)
SODIUM SERPL-SCNC: 132 MMOL/L (ref 135–147)
T WAVE AXIS: 77 DEGREES
VENTRICULAR RATE: 64 BPM
WBC # BLD AUTO: 10 THOUSAND/UL (ref 4.31–10.16)

## 2025-02-09 PROCEDURE — 93010 ELECTROCARDIOGRAM REPORT: CPT | Performed by: INTERNAL MEDICINE

## 2025-02-09 PROCEDURE — 99232 SBSQ HOSP IP/OBS MODERATE 35: CPT | Performed by: PHYSICIAN ASSISTANT

## 2025-02-09 PROCEDURE — 82948 REAGENT STRIP/BLOOD GLUCOSE: CPT

## 2025-02-09 PROCEDURE — 99233 SBSQ HOSP IP/OBS HIGH 50: CPT | Performed by: INTERNAL MEDICINE

## 2025-02-09 PROCEDURE — 85730 THROMBOPLASTIN TIME PARTIAL: CPT | Performed by: INTERNAL MEDICINE

## 2025-02-09 PROCEDURE — 84145 PROCALCITONIN (PCT): CPT | Performed by: NURSE PRACTITIONER

## 2025-02-09 PROCEDURE — 85027 COMPLETE CBC AUTOMATED: CPT | Performed by: PHYSICIAN ASSISTANT

## 2025-02-09 PROCEDURE — 80048 BASIC METABOLIC PNL TOTAL CA: CPT | Performed by: PHYSICIAN ASSISTANT

## 2025-02-09 RX ORDER — INSULIN ASPART 100 [IU]/ML
15 INJECTION, SUSPENSION SUBCUTANEOUS
Status: DISCONTINUED | OUTPATIENT
Start: 2025-02-10 | End: 2025-02-11 | Stop reason: HOSPADM

## 2025-02-09 RX ORDER — CLONIDINE HYDROCHLORIDE 0.1 MG/1
0.1 TABLET ORAL DAILY
Status: DISCONTINUED | OUTPATIENT
Start: 2025-02-09 | End: 2025-02-11

## 2025-02-09 RX ORDER — INSULIN LISPRO 100 [IU]/ML
1-6 INJECTION, SOLUTION INTRAVENOUS; SUBCUTANEOUS
Status: DISCONTINUED | OUTPATIENT
Start: 2025-02-10 | End: 2025-02-11 | Stop reason: HOSPADM

## 2025-02-09 RX ADMIN — TICAGRELOR 90 MG: 90 TABLET ORAL at 08:11

## 2025-02-09 RX ADMIN — INSULIN ASPART 25 UNITS: 100 INJECTION, SUSPENSION SUBCUTANEOUS at 17:48

## 2025-02-09 RX ADMIN — ACETAMINOPHEN 650 MG: 325 TABLET, FILM COATED ORAL at 08:10

## 2025-02-09 RX ADMIN — EZETIMIBE 10 MG: 10 TABLET ORAL at 08:11

## 2025-02-09 RX ADMIN — TICAGRELOR 90 MG: 90 TABLET ORAL at 21:58

## 2025-02-09 RX ADMIN — INSULIN LISPRO 2 UNITS: 100 INJECTION, SOLUTION INTRAVENOUS; SUBCUTANEOUS at 11:55

## 2025-02-09 RX ADMIN — CLONIDINE HYDROCHLORIDE 0.1 MG: 0.1 TABLET ORAL at 17:48

## 2025-02-09 RX ADMIN — INSULIN LISPRO 1 UNITS: 100 INJECTION, SOLUTION INTRAVENOUS; SUBCUTANEOUS at 08:09

## 2025-02-09 RX ADMIN — INSULIN ASPART 25 UNITS: 100 INJECTION, SUSPENSION SUBCUTANEOUS at 08:09

## 2025-02-09 RX ADMIN — ASPIRIN 81 MG: 81 TABLET, COATED ORAL at 08:11

## 2025-02-09 RX ADMIN — ATORVASTATIN CALCIUM 80 MG: 80 TABLET, FILM COATED ORAL at 17:48

## 2025-02-09 RX ADMIN — GABAPENTIN 100 MG: 100 CAPSULE ORAL at 17:48

## 2025-02-09 RX ADMIN — GABAPENTIN 100 MG: 100 CAPSULE ORAL at 21:58

## 2025-02-09 RX ADMIN — PANTOPRAZOLE SODIUM 40 MG: 40 TABLET, DELAYED RELEASE ORAL at 08:10

## 2025-02-09 RX ADMIN — GABAPENTIN 100 MG: 100 CAPSULE ORAL at 08:10

## 2025-02-09 RX ADMIN — HEPARIN SODIUM 16 UNITS/KG/HR: 10000 INJECTION, SOLUTION INTRAVENOUS at 15:41

## 2025-02-09 NOTE — PLAN OF CARE
Problem: PAIN - ADULT  Goal: Verbalizes/displays adequate comfort level or baseline comfort level  Description: Interventions:  - Encourage patient to monitor pain and request assistance  - Assess pain using appropriate pain scale  - Administer analgesics based on type and severity of pain and evaluate response  - Implement non-pharmacological measures as appropriate and evaluate response  - Consider cultural and social influences on pain and pain management  - Notify physician/advanced practitioner if interventions unsuccessful or patient reports new pain  Outcome: Progressing     Problem: INFECTION - ADULT  Goal: Absence or prevention of progression during hospitalization  Description: INTERVENTIONS:  - Assess and monitor for signs and symptoms of infection  - Monitor lab/diagnostic results  - Monitor all insertion sites, i.e. indwelling lines, tubes, and drains  - Monitor endotracheal if appropriate and nasal secretions for changes in amount and color  - [place] appropriate cooling/warming therapies per order  - Administer medications as ordered  - Instruct and encourage patient and family to use good hand hygiene technique  - Identify and instruct in appropriate isolation precautions for identified infection/condition  Outcome: Progressing

## 2025-02-09 NOTE — PROGRESS NOTES
Progress Note - Hospitalist   Name: Wagner Ochoa 74 y.o. male I MRN: [id number]  Unit/Bed#: [facility] 405-01 I Date of Admission: 2/7/2025   Date of Service: 2/9/2025 I Hospital Day: 2    Assessment & Plan  Chest pain  Patient with history of hypertension, hyperlipidemia, DM 2.  Presented to the ED after experiencing chest pain for about 3 days.   Patient could not reliably state if it was better or worse with exertion, but he continues to have no active chest pain at this time  EKG with ST depressions in V1 and V2, possibly some moderately elevated ST segments in V3 through V6  Notably, troponin level >22,973 x3.  [place] ED discussed with interventional cardiology who recommended medical therapy with Brilinta, aspirin, and heparin infusion.  Patient was transferred to [facility] for interventional evaluation - plan for cath Monday 2/10/25  Cardiology following, appreciate their ongoing recs  Tele  Mixed hyperlipidemia  Continue atorvastatin 80 mg nightly and Zetia 10 mg daily  Gastroesophageal reflux disease  Protonix 40 mg daily on board  Essential hypertension  Pressures have been soft but stable  Continue Norvasc 10 mg daily, clonidine 0.2 mg twice daily, losartan 100 mg daily, and terazosin 10 mg daily with hold parameters  Chronic midline low back pain with right-sided sciatica  Continue tramadol 50 mg daily  CKD (chronic kidney disease) stage 2, GFR 60-89 ml/min  Lab Results   Component Value Date    EGFR 81 02/08/2025    EGFR 78 02/07/2025    EGFR 84 08/21/2024    CREATININE 0.91 02/08/2025    CREATININE 0.94 02/07/2025    CREATININE 0.88 08/21/2024   Renal function appears within baseline, continue to monitor  Note plan for cath Monday  Peripheral polyneuropathy  Continue gabapentin 100 mg p.o. 3 times daily  Type 2 diabetes mellitus with hyperglycemia, with long-term current use of insulin ([facility])  Lab Results   Component Value Date    HGBA1C 11.4 (A) 12/17/2024       Recent Labs     02/08/25  1116  02/08/25  1708 02/08/25  2106 02/09/25  0755   POCGLU 170* 69 156* 169*     Uncontrolled as evidenced by A1c above  Home regimen includes NovoLog 70/30 50 units twice daily, Mounjaro 7.5 subQ weekly, and Jardiance 25 daily  On admission, 70/30 was decreased to 25 units twice daily and SSI TID AC and QHS  Continue for now and adjust as indicated   Consistent carb diet  Fever  TMax 101 overnight  Unclear etiology  Patient without infectious complaint  COVID/FLU/RSV negative  Procal negative x2  Blood cultures pending  Not tachycardic or tachypneic.   Check CBC but did not have leukocytosis on last check  Lactic was WNL  Given no clear bacterial infectious etiology at this time, would monitor closely off abx for now    VTE Pharmacologic Prophylaxis:    heparin GTT as above    Mobility:   Basic Mobility Inpatient Raw Score: 24  JH-HLM Goal: 8: Walk 250 feet or more  JH-HLM Achieved: 6: Walk 10 steps or more  JH-HLM Goal NOT achieved. Continue with multidisciplinary rounding and encourage appropriate mobility to improve upon JH-HLM goals.    Patient Centered Rounds: I performed bedside rounds with nursing staff today.  Lisa  Discussions with Specialists or Other Care Team Provider: Dr. Dru ROJAS attending     Education and Discussions with Family / Patient: Patient declined call to .     Current Length of Stay: 2 day(s)  Current Patient Status: Inpatient   Certification Statement: The patient will continue to require additional inpatient hospital stay due to fever, cath Monday  Discharge Plan:  pending infectious work up and cath Monday    Code Status: Level 1 - Full Code    Subjective   Mr. Ochoa reports having a fever overnight. Denies URI-like symptoms, cough, chills, CP, SOB, abdominal pain, N/V/D. Denies pain with urination.     Objective :  Temp:  [98.1 °F (36.7 °C)-101 °F (38.3 °C)] 99.5 °F (37.5 °C)  HR:  [59-68] 66  BP: ()/(41-63) 88/47  Resp:  [18-19] 18  SpO2:  [96 %-97 %] 96 %  O2  Device: None (Room air)    Body mass index is 26 kg/m².     Input and Output Summary (last 24 hours):     Intake/Output Summary (Last 24 hours) at 2/9/2025 0923  Last data filed at 2/9/2025 0821  Gross per 24 hour   Intake 447 ml   Output 600 ml   Net -153 ml       Physical Exam  Vitals reviewed.   Constitutional:       General: He is not in acute distress.     Appearance: He is not ill-appearing, toxic-appearing or diaphoretic.      Comments: Patient seen lying in bed, NAD, RN present   Cardiovascular:      Rate and Rhythm: Normal rate and regular rhythm.   Pulmonary:      Effort: Pulmonary effort is normal. No respiratory distress.      Breath sounds: Normal breath sounds.   Abdominal:      General: Bowel sounds are normal.      Palpations: Abdomen is soft.      Tenderness: There is no abdominal tenderness.   Musculoskeletal:      Right lower leg: No edema.      Left lower leg: No edema.   Skin:     General: Skin is warm.   Neurological:      Mental Status: He is alert and oriented to person, place, and time.   Psychiatric:         Mood and Affect: Mood normal.           Lines/Drains:        Telemetry:  Telemetry Orders (From admission, onward)               24 Hour Telemetry Monitoring  Continuous x 24 Hours (Telem)        Expiring   Question:  Reason for 24 Hour Telemetry  Answer:  Patients with jace/reese/endocarditis; cardiac contusion                       Indication for Continued Telemetry Use: Acute MI/Unstable Angina/Rule out ACS               Lab Results: I have reviewed the following results:   Results from last 7 days   Lab Units 02/08/25  0520 02/07/25  1016   WBC Thousand/uL 8.16 7.72   HEMOGLOBIN g/dL 12.3 13.9   HEMATOCRIT % 37.8 42.7   PLATELETS Thousands/uL 155 168   SEGS PCT %  --  50   LYMPHO PCT %  --  39   MONO PCT %  --  9   EOS PCT %  --  1     Results from last 7 days   Lab Units 02/08/25  0520 02/07/25  1016   SODIUM mmol/L 136 137   POTASSIUM mmol/L 5.2 5.2   CHLORIDE mmol/L 104 99   CO2  mmol/L 25 32   BUN mg/dL 20 18   CREATININE mg/dL 0.91 0.94   ANION GAP mmol/L 7 6   CALCIUM mg/dL 9.2 10.3*   ALBUMIN g/dL  --  4.1   TOTAL BILIRUBIN mg/dL  --  0.43   ALK PHOS U/L  --  69   ALT U/L  --  34   AST U/L  --  220*   GLUCOSE RANDOM mg/dL 264* 206*     Results from last 7 days   Lab Units 02/07/25  1025   INR  0.96     Results from last 7 days   Lab Units 02/09/25  0755 02/08/25  2106 02/08/25  1708 02/08/25  1116 02/08/25  0750 02/07/25  2152 02/07/25  1027   POC GLUCOSE mg/dl 169* 156* 69 170* 279* 222* 198*         Results from last 7 days   Lab Units 02/09/25  0451 02/08/25 2050   LACTIC ACID mmol/L  --  1.5   PROCALCITONIN ng/ml 0.17 0.15       Recent Cultures (last 7 days):   Results from last 7 days   Lab Units 02/08/25 2050   BLOOD CULTURE  Received in Microbiology Lab. Culture in Progress.  Received in Microbiology Lab. Culture in Progress.       Imaging Results Review: I reviewed radiology reports from this admission including: chest xray.      Last 24 Hours Medication List:     Current Facility-Administered Medications:     acetaminophen (TYLENOL) tablet 650 mg, Q6H PRN    albuterol (PROVENTIL HFA,VENTOLIN HFA) inhaler 2 puff, Q4H PRN    [Held by provider] amLODIPine (NORVASC) tablet 10 mg, Daily    aspirin (ECOTRIN LOW STRENGTH) EC tablet 81 mg, Daily    atorvastatin (LIPITOR) tablet 80 mg, Daily With Dinner    cloNIDine (CATAPRES) tablet 0.2 mg, BID    ezetimibe (ZETIA) tablet 10 mg, Daily    gabapentin (NEURONTIN) capsule 100 mg, TID    heparin (porcine) 25,000 units in 0.45% NaCl 250 mL infusion (premix), Titrated, Last Rate: 16 Units/kg/hr (02/08/25 1209)    heparin (porcine) injection 2,000 Units, Q6H PRN    heparin (porcine) injection 4,000 Units, Q6H PRN    insulin aspart protamine-insulin aspart (NovoLOG 70/30) 100 units/mL subcutaneous injection 25 Units, BID AC    insulin lispro (HumALOG/ADMELOG) 100 units/mL subcutaneous injection 1-6 Units, TID AC **AND** Fingerstick Glucose  (POCT), TID AC    insulin lispro (HumALOG/ADMELOG) 100 units/mL subcutaneous injection 1-6 Units, HS    [Held by provider] losartan (COZAAR) tablet 25 mg, Daily    [Held by provider] metoprolol tartrate (LOPRESSOR) partial tablet 12.5 mg, Q12H VINAYAK    pantoprazole (PROTONIX) EC tablet 40 mg, Daily    [Held by provider] terazosin (HYTRIN) capsule 10 mg, HS    ticagrelor (BRILINTA) tablet 90 mg, Q12H VINAYAK    traMADol (ULTRAM) tablet 50 mg, Q12H PRN    Administrative Statements   Today, Patient Was Seen By: Mason Pena PA-C      **Please Note: This note may have been constructed using a voice recognition system.**

## 2025-02-09 NOTE — ASSESSMENT & PLAN NOTE
Pressures have been soft but stable  Continue Norvasc 10 mg daily, clonidine 0.2 mg twice daily, losartan 100 mg daily, and terazosin 10 mg daily with hold parameters

## 2025-02-09 NOTE — ASSESSMENT & PLAN NOTE
Patient with history of hypertension, hyperlipidemia, DM 2.  Presented to the ED after experiencing chest pain for about 3 days.   Patient could not reliably state if it was better or worse with exertion, but he continues to have no active chest pain at this time  EKG with ST depressions in V1 and V2, possibly some moderately elevated ST segments in V3 through V6  Notably, troponin level >22,973 x3.  [place] ED discussed with interventional cardiology who recommended medical therapy with Brilinta, aspirin, and heparin infusion.  Patient was transferred to [facility] for interventional evaluation - plan for cath Monday 2/10/25  Cardiology following, appreciate their ongoing recs  Tele

## 2025-02-09 NOTE — ASSESSMENT & PLAN NOTE
Lab Results   Component Value Date    HGBA1C 11.4 (A) 12/17/2024       Recent Labs     02/08/25  1116 02/08/25  1708 02/08/25  2106 02/09/25  0755   POCGLU 170* 69 156* 169*     Uncontrolled as evidenced by A1c above  Home regimen includes NovoLog 70/30 50 units twice daily, Mounjaro 7.5 subQ weekly, and Jardiance 25 daily  On admission, 70/30 was decreased to 25 units twice daily and SSI TID AC and QHS  Continue for now and adjust as indicated   Consistent carb diet

## 2025-02-09 NOTE — ASSESSMENT & PLAN NOTE
TMax 101 overnight  Unclear etiology  Patient without infectious complaint  COVID/FLU/RSV negative  Procal negative x2  Blood cultures pending  Not tachycardic or tachypneic.   Check CBC but did not have leukocytosis on last check  Lactic was WNL  Given no clear bacterial infectious etiology at this time, would monitor closely off abx for now

## 2025-02-09 NOTE — QUICK NOTE
Reviewed chart noting nursing prior to this shift requesting tylenol for low grade temperature. Will order bc x 2 /lactic and repeat pcr for flu/rsc covid as last was noted to be antigen. Will obtain ua with reflex   Continue to monitor added procalcitonin

## 2025-02-10 LAB
ANION GAP SERPL CALCULATED.3IONS-SCNC: 6 MMOL/L (ref 4–13)
APTT PPP: 58 SECONDS (ref 23–34)
B PARAP IS1001 DNA NPH QL NAA+NON-PROBE: NOT DETECTED
B PERT.PT PRMT NPH QL NAA+NON-PROBE: NOT DETECTED
BUN SERPL-MCNC: 22 MG/DL (ref 5–25)
C PNEUM DNA NPH QL NAA+NON-PROBE: NOT DETECTED
CALCIUM SERPL-MCNC: 9.2 MG/DL (ref 8.4–10.2)
CHLORIDE SERPL-SCNC: 103 MMOL/L (ref 96–108)
CO2 SERPL-SCNC: 28 MMOL/L (ref 21–32)
CORTIS AM PEAK SERPL-MCNC: 9.6 UG/DL (ref 6.7–22.6)
CREAT SERPL-MCNC: 0.89 MG/DL (ref 0.6–1.3)
ERYTHROCYTE [DISTWIDTH] IN BLOOD BY AUTOMATED COUNT: 12.3 % (ref 11.6–15.1)
FLUAV RNA NPH QL NAA+NON-PROBE: NOT DETECTED
FLUBV RNA NPH QL NAA+NON-PROBE: NOT DETECTED
GFR SERPL CREATININE-BSD FRML MDRD: 83 ML/MIN/1.73SQ M
GLUCOSE SERPL-MCNC: 110 MG/DL (ref 65–140)
GLUCOSE SERPL-MCNC: 114 MG/DL (ref 65–140)
GLUCOSE SERPL-MCNC: 125 MG/DL (ref 65–140)
GLUCOSE SERPL-MCNC: 136 MG/DL (ref 65–140)
GLUCOSE SERPL-MCNC: 344 MG/DL (ref 65–140)
HADV DNA NPH QL NAA+NON-PROBE: NOT DETECTED
HCOV 229E RNA NPH QL NAA+NON-PROBE: NOT DETECTED
HCOV HKU1 RNA NPH QL NAA+NON-PROBE: NOT DETECTED
HCOV NL63 RNA NPH QL NAA+NON-PROBE: NOT DETECTED
HCOV OC43 RNA NPH QL NAA+NON-PROBE: DETECTED
HCT VFR BLD AUTO: 35.8 % (ref 36.5–49.3)
HGB BLD-MCNC: 11.5 G/DL (ref 12–17)
HMPV RNA NPH QL NAA+NON-PROBE: NOT DETECTED
HPIV1 RNA NPH QL NAA+NON-PROBE: NOT DETECTED
HPIV2 RNA NPH QL NAA+NON-PROBE: NOT DETECTED
HPIV3 RNA NPH QL NAA+NON-PROBE: NOT DETECTED
HPIV4 RNA NPH QL NAA+NON-PROBE: NOT DETECTED
KCT BLD-ACNC: 312 SEC (ref 89–137)
M PNEUMO DNA NPH QL NAA+NON-PROBE: NOT DETECTED
MAGNESIUM SERPL-MCNC: 2 MG/DL (ref 1.9–2.7)
MCH RBC QN AUTO: 29.9 PG (ref 26.8–34.3)
MCHC RBC AUTO-ENTMCNC: 32.1 G/DL (ref 31.4–37.4)
MCV RBC AUTO: 93 FL (ref 82–98)
PLATELET # BLD AUTO: 152 THOUSANDS/UL (ref 149–390)
PMV BLD AUTO: 11.5 FL (ref 8.9–12.7)
POTASSIUM SERPL-SCNC: 4.2 MMOL/L (ref 3.5–5.3)
RBC # BLD AUTO: 3.84 MILLION/UL (ref 3.88–5.62)
RSV RNA NPH QL NAA+NON-PROBE: NOT DETECTED
RV+EV RNA NPH QL NAA+NON-PROBE: NOT DETECTED
SARS-COV-2 RNA NPH QL NAA+NON-PROBE: NOT DETECTED
SODIUM SERPL-SCNC: 137 MMOL/L (ref 135–147)
SPECIMEN SOURCE: ABNORMAL
TSH SERPL DL<=0.05 MIU/L-ACNC: 1.48 UIU/ML (ref 0.45–4.5)
WBC # BLD AUTO: 7.95 THOUSAND/UL (ref 4.31–10.16)

## 2025-02-10 PROCEDURE — 93458 L HRT ARTERY/VENTRICLE ANGIO: CPT | Performed by: INTERNAL MEDICINE

## 2025-02-10 PROCEDURE — C1769 GUIDE WIRE: HCPCS | Performed by: INTERNAL MEDICINE

## 2025-02-10 PROCEDURE — 85027 COMPLETE CBC AUTOMATED: CPT | Performed by: PHYSICIAN ASSISTANT

## 2025-02-10 PROCEDURE — 85730 THROMBOPLASTIN TIME PARTIAL: CPT | Performed by: INTERNAL MEDICINE

## 2025-02-10 PROCEDURE — C9600 PERC DRUG-EL COR STENT SING: HCPCS | Performed by: INTERNAL MEDICINE

## 2025-02-10 PROCEDURE — 92928 PRQ TCAT PLMT NTRAC ST 1 LES: CPT | Performed by: INTERNAL MEDICINE

## 2025-02-10 PROCEDURE — 80048 BASIC METABOLIC PNL TOTAL CA: CPT | Performed by: PHYSICIAN ASSISTANT

## 2025-02-10 PROCEDURE — 99152 MOD SED SAME PHYS/QHP 5/>YRS: CPT | Performed by: INTERNAL MEDICINE

## 2025-02-10 PROCEDURE — 99232 SBSQ HOSP IP/OBS MODERATE 35: CPT | Performed by: INTERNAL MEDICINE

## 2025-02-10 PROCEDURE — 0202U NFCT DS 22 TRGT SARS-COV-2: CPT | Performed by: PHYSICIAN ASSISTANT

## 2025-02-10 PROCEDURE — C1894 INTRO/SHEATH, NON-LASER: HCPCS | Performed by: INTERNAL MEDICINE

## 2025-02-10 PROCEDURE — C1725 CATH, TRANSLUMIN NON-LASER: HCPCS | Performed by: INTERNAL MEDICINE

## 2025-02-10 PROCEDURE — 027034Z DILATION OF CORONARY ARTERY, ONE ARTERY WITH DRUG-ELUTING INTRALUMINAL DEVICE, PERCUTANEOUS APPROACH: ICD-10-PCS | Performed by: INTERNAL MEDICINE

## 2025-02-10 PROCEDURE — C1874 STENT, COATED/COV W/DEL SYS: HCPCS | Performed by: INTERNAL MEDICINE

## 2025-02-10 PROCEDURE — 83735 ASSAY OF MAGNESIUM: CPT | Performed by: PHYSICIAN ASSISTANT

## 2025-02-10 PROCEDURE — B2111ZZ FLUOROSCOPY OF MULTIPLE CORONARY ARTERIES USING LOW OSMOLAR CONTRAST: ICD-10-PCS | Performed by: INTERNAL MEDICINE

## 2025-02-10 PROCEDURE — 82948 REAGENT STRIP/BLOOD GLUCOSE: CPT

## 2025-02-10 PROCEDURE — 99232 SBSQ HOSP IP/OBS MODERATE 35: CPT | Performed by: PHYSICIAN ASSISTANT

## 2025-02-10 PROCEDURE — 82533 TOTAL CORTISOL: CPT | Performed by: INTERNAL MEDICINE

## 2025-02-10 PROCEDURE — 99153 MOD SED SAME PHYS/QHP EA: CPT | Performed by: INTERNAL MEDICINE

## 2025-02-10 PROCEDURE — B2151ZZ FLUOROSCOPY OF LEFT HEART USING LOW OSMOLAR CONTRAST: ICD-10-PCS | Performed by: INTERNAL MEDICINE

## 2025-02-10 PROCEDURE — 85347 COAGULATION TIME ACTIVATED: CPT

## 2025-02-10 PROCEDURE — 84443 ASSAY THYROID STIM HORMONE: CPT | Performed by: INTERNAL MEDICINE

## 2025-02-10 PROCEDURE — 4A023N7 MEASUREMENT OF CARDIAC SAMPLING AND PRESSURE, LEFT HEART, PERCUTANEOUS APPROACH: ICD-10-PCS | Performed by: INTERNAL MEDICINE

## 2025-02-10 PROCEDURE — C1887 CATHETER, GUIDING: HCPCS | Performed by: INTERNAL MEDICINE

## 2025-02-10 DEVICE — STENT ONYXNG25022UX ONYX 2.50X22RX
Type: IMPLANTABLE DEVICE | Site: CORONARY | Status: FUNCTIONAL
Brand: ONYX FRONTIER™

## 2025-02-10 RX ORDER — MIDAZOLAM HYDROCHLORIDE 2 MG/2ML
INJECTION, SOLUTION INTRAMUSCULAR; INTRAVENOUS CODE/TRAUMA/SEDATION MEDICATION
Status: DISCONTINUED | OUTPATIENT
Start: 2025-02-10 | End: 2025-02-10 | Stop reason: HOSPADM

## 2025-02-10 RX ORDER — DIPHENHYDRAMINE HYDROCHLORIDE 50 MG/ML
50 INJECTION INTRAMUSCULAR; INTRAVENOUS ONCE
Status: COMPLETED | OUTPATIENT
Start: 2025-02-10 | End: 2025-02-10

## 2025-02-10 RX ORDER — SODIUM CHLORIDE 9 MG/ML
100 INJECTION, SOLUTION INTRAVENOUS CONTINUOUS
Status: DISPENSED | OUTPATIENT
Start: 2025-02-10 | End: 2025-02-10

## 2025-02-10 RX ORDER — ACETAMINOPHEN 325 MG/1
975 TABLET ORAL ONCE AS NEEDED
Status: DISCONTINUED | OUTPATIENT
Start: 2025-02-10 | End: 2025-02-11

## 2025-02-10 RX ORDER — FAMOTIDINE 10 MG/ML
20 INJECTION, SOLUTION INTRAVENOUS ONCE
Status: COMPLETED | OUTPATIENT
Start: 2025-02-10 | End: 2025-02-10

## 2025-02-10 RX ORDER — HYDROCORTISONE SODIUM SUCCINATE 100 MG/2ML
100 INJECTION INTRAMUSCULAR; INTRAVENOUS ONCE
Status: COMPLETED | OUTPATIENT
Start: 2025-02-10 | End: 2025-02-10

## 2025-02-10 RX ORDER — AMOXICILLIN 250 MG
1 CAPSULE ORAL
Status: DISCONTINUED | OUTPATIENT
Start: 2025-02-10 | End: 2025-02-11 | Stop reason: HOSPADM

## 2025-02-10 RX ORDER — NITROGLYCERIN 20 MG/100ML
INJECTION INTRAVENOUS CODE/TRAUMA/SEDATION MEDICATION
Status: DISCONTINUED | OUTPATIENT
Start: 2025-02-10 | End: 2025-02-10 | Stop reason: HOSPADM

## 2025-02-10 RX ORDER — LIDOCAINE HYDROCHLORIDE 10 MG/ML
INJECTION, SOLUTION EPIDURAL; INFILTRATION; INTRACAUDAL; PERINEURAL CODE/TRAUMA/SEDATION MEDICATION
Status: DISCONTINUED | OUTPATIENT
Start: 2025-02-10 | End: 2025-02-10 | Stop reason: HOSPADM

## 2025-02-10 RX ORDER — ACETAMINOPHEN 325 MG/1
650 TABLET ORAL EVERY 6 HOURS PRN
Status: DISCONTINUED | OUTPATIENT
Start: 2025-02-10 | End: 2025-02-11 | Stop reason: HOSPADM

## 2025-02-10 RX ORDER — NITROGLYCERIN 0.4 MG/1
0.4 TABLET SUBLINGUAL ONCE AS NEEDED
Status: DISCONTINUED | OUTPATIENT
Start: 2025-02-10 | End: 2025-02-11 | Stop reason: HOSPADM

## 2025-02-10 RX ORDER — ONDANSETRON 2 MG/ML
4 INJECTION INTRAMUSCULAR; INTRAVENOUS ONCE AS NEEDED
Status: DISCONTINUED | OUTPATIENT
Start: 2025-02-10 | End: 2025-02-11 | Stop reason: HOSPADM

## 2025-02-10 RX ORDER — VERAPAMIL HYDROCHLORIDE 2.5 MG/ML
INJECTION, SOLUTION INTRAVENOUS CODE/TRAUMA/SEDATION MEDICATION
Status: DISCONTINUED | OUTPATIENT
Start: 2025-02-10 | End: 2025-02-10 | Stop reason: HOSPADM

## 2025-02-10 RX ORDER — HEPARIN SODIUM 1000 [USP'U]/ML
INJECTION, SOLUTION INTRAVENOUS; SUBCUTANEOUS CODE/TRAUMA/SEDATION MEDICATION
Status: DISCONTINUED | OUTPATIENT
Start: 2025-02-10 | End: 2025-02-10 | Stop reason: HOSPADM

## 2025-02-10 RX ORDER — FENTANYL CITRATE 50 UG/ML
INJECTION, SOLUTION INTRAMUSCULAR; INTRAVENOUS CODE/TRAUMA/SEDATION MEDICATION
Status: DISCONTINUED | OUTPATIENT
Start: 2025-02-10 | End: 2025-02-10 | Stop reason: HOSPADM

## 2025-02-10 RX ORDER — OXYCODONE AND ACETAMINOPHEN 5; 325 MG/1; MG/1
1 TABLET ORAL EVERY 4 HOURS PRN
Status: DISCONTINUED | OUTPATIENT
Start: 2025-02-10 | End: 2025-02-11

## 2025-02-10 RX ADMIN — ATORVASTATIN CALCIUM 80 MG: 80 TABLET, FILM COATED ORAL at 17:11

## 2025-02-10 RX ADMIN — HEPARIN SODIUM 2000 UNITS: 1000 INJECTION INTRAVENOUS; SUBCUTANEOUS at 07:34

## 2025-02-10 RX ADMIN — ASPIRIN 81 MG: 81 TABLET, COATED ORAL at 08:14

## 2025-02-10 RX ADMIN — SENNOSIDES AND DOCUSATE SODIUM 1 TABLET: 50; 8.6 TABLET ORAL at 17:13

## 2025-02-10 RX ADMIN — SODIUM CHLORIDE 100 ML/HR: 0.9 INJECTION, SOLUTION INTRAVENOUS at 12:21

## 2025-02-10 RX ADMIN — TICAGRELOR 90 MG: 90 TABLET ORAL at 21:45

## 2025-02-10 RX ADMIN — INSULIN ASPART 15 UNITS: 100 INJECTION, SUSPENSION SUBCUTANEOUS at 17:09

## 2025-02-10 RX ADMIN — GABAPENTIN 100 MG: 100 CAPSULE ORAL at 08:13

## 2025-02-10 RX ADMIN — ACETAMINOPHEN 650 MG: 325 TABLET, FILM COATED ORAL at 08:12

## 2025-02-10 RX ADMIN — FAMOTIDINE 20 MG: 10 INJECTION, SOLUTION INTRAVENOUS at 10:13

## 2025-02-10 RX ADMIN — GABAPENTIN 100 MG: 100 CAPSULE ORAL at 21:45

## 2025-02-10 RX ADMIN — EZETIMIBE 10 MG: 10 TABLET ORAL at 08:13

## 2025-02-10 RX ADMIN — SODIUM CHLORIDE 100 ML/HR: 0.9 INJECTION, SOLUTION INTRAVENOUS at 01:04

## 2025-02-10 RX ADMIN — DIPHENHYDRAMINE HYDROCHLORIDE 50 MG: 50 INJECTION, SOLUTION INTRAMUSCULAR; INTRAVENOUS at 10:12

## 2025-02-10 RX ADMIN — PANTOPRAZOLE SODIUM 40 MG: 40 TABLET, DELAYED RELEASE ORAL at 08:14

## 2025-02-10 RX ADMIN — INSULIN LISPRO 5 UNITS: 100 INJECTION, SOLUTION INTRAVENOUS; SUBCUTANEOUS at 21:47

## 2025-02-10 RX ADMIN — HYDROCORTISONE SODIUM SUCCINATE 100 MG: 100 INJECTION, POWDER, FOR SOLUTION INTRAMUSCULAR; INTRAVENOUS at 10:13

## 2025-02-10 RX ADMIN — GABAPENTIN 100 MG: 100 CAPSULE ORAL at 17:09

## 2025-02-10 RX ADMIN — TICAGRELOR 90 MG: 90 TABLET ORAL at 08:15

## 2025-02-10 NOTE — ASSESSMENT & PLAN NOTE
Lab Results   Component Value Date    EGFR 83 02/10/2025    EGFR 73 02/09/2025    EGFR 81 02/08/2025    CREATININE 0.89 02/10/2025    CREATININE 0.99 02/09/2025    CREATININE 0.91 02/08/2025   Monitor BMP.  Cr stable currently

## 2025-02-10 NOTE — ASSESSMENT & PLAN NOTE
Home regimen includes amlodipine, losartan and clonidine.    With lower BPs, holding amlodipine and attempting to wean off clonidine in favor of a B-blocker with CAD.

## 2025-02-10 NOTE — ASSESSMENT & PLAN NOTE
PMH of HTN, HLD, & DM 2 who presented to ED after experiencing chest pain for about 3 days.   Patient could not reliably state if it was better or worse with exertion;  no current  CP   EKG with ST depressions in V1 and V2, possibly some moderately elevated ST segments in V3 through V6  Notably, troponin level >22,973 x 3.  Transferred from  ED to [facility] for  interventional evaluation  C/w medical therapy w/ Brilinta, aspirin, statin, Zetia, heparin gtt  Pending cardiac catheterization later today (02/10)   Cardiology following, appreciate ongoing recs  C/w Tele

## 2025-02-10 NOTE — PROGRESS NOTES
Cardiology Progress Note - Wagner Ochoa 74 y.o. male MRN: [id number]    Unit/Bed#: [facility] 405-01 Encounter: [id number]        Assessment & Plan  Chest pain  The patient presentrf to the ED complaining of 3 days of persistent chest pain.   Troponin was elevated >23,000 x3 and ECG showed ST depressions in V1-V4 and mild ST elevations in V5/V6.    The case was discussed with interventional cardiology who recommended medical management and transfer to [facility] for cardiac catheterization.    The patient's presentation likely represents a completed myocardial infarction.  Continued on heparin drip.  Continue aspirin and Brilinta.  Continue Lipitor.  Continue amlodipine.  Attempting to add a beta-blocker if BP allows.  Echocardiogram reveals wall motion abnormalities and preserved EF.  Telemetry monitoring.  Plan for cardiac catheterization today.    Maintain NPO after midnight until after cardiac cath  Mixed hyperlipidemia  Continue statin and Zetia.  Essential hypertension  Home regimen includes amlodipine, losartan and clonidine.    With lower BPs, holding amlodipine and attempting to wean off clonidine in favor of a B-blocker with CAD.   CKD (chronic kidney disease) stage 2, GFR 60-89 ml/min  Lab Results   Component Value Date    EGFR 83 02/10/2025    EGFR 73 02/09/2025    EGFR 81 02/08/2025    CREATININE 0.89 02/10/2025    CREATININE 0.99 02/09/2025    CREATININE 0.91 02/08/2025   Monitor BMP.  Cr stable currently  Gastroesophageal reflux disease  Continue Protonix.  Type 2 diabetes mellitus with hyperglycemia, with long-term current use of insulin ([facility])  Lab Results   Component Value Date    HGBA1C 11.4 (A) 12/17/2024     Management as per primary team  Recent Labs     02/09/25  2150 02/09/25  2218 02/10/25  0152 02/10/25  0744   POCGLU 62* 108 125 110       Blood Sugar Average: Last 72 hrs:  (P) [id number]  Chronic midline low back pain with right-sided sciatica  Continued outpatient follow up and  "management  Peripheral polyneuropathy  As related to DM, continue management as per primary team  Fever  Work-up and management as per primary team  WBC count and procalcitonin levels appear normal    Subjective:   Patient seen and examined.  No significant events overnight.  ; pertinent negatives - chest pain, chest pressure/discomfort, dyspnea, irregular heart beat, lower extremity edema, and palpitations.    Objective:     Vitals: Blood pressure 108/65, pulse 76, temperature 100.3 °F (37.9 °C), temperature source Oral, resp. rate 18, height 5' 7\" (1.702 m), weight 75.3 kg (166 lb), SpO2 99%., Body mass index is 26 kg/m².,   Orthostatic Blood Pressures      Flowsheet Row Most Recent Value   Blood Pressure 108/65 filed at 02/10/2025 0705   Patient Position - Orthostatic VS Lying filed at 02/10/2025 0705              Intake/Output Summary (Last 24 hours) at 2/10/2025 0904  Last data filed at 2/10/2025 0801  Gross per 24 hour   Intake 248 ml   Output 750 ml   Net -502 ml       TELE: No significant arrhythmias seen.    Physical Exam:    GEN: Wagner Ochoa appears well, alert and oriented x 3, pleasant and cooperative   HEENT: pupils equal, round, and reactive to light; extraocular muscles intact  NECK: supple, no carotid bruits   HEART: regular rhythm, normal S1 and S2, +systolic murmur, no clicks, gallops or rubs   LUNGS: clear to auscultation bilaterally; no wheezes, rales, or rhonchi   ABDOMEN: normal bowel sounds, soft, no tenderness, no distention  EXTREMITIES: peripheral pulses normal; no clubbing, cyanosis, or edema  NEURO: no focal findings   SKIN: normal without suspicious lesions on exposed skin    Medications:      Current Facility-Administered Medications:     acetaminophen (TYLENOL) tablet 650 mg, 650 mg, Oral, Q6H PRN, Marlys Dunn PA-C, 650 mg at 02/10/25 0812    albuterol (PROVENTIL HFA,VENTOLIN HFA) inhaler 2 puff, 2 puff, Inhalation, Q4H PRN, Lv Schaffer    [Held by provider] amLODIPine " (NORVASC) tablet 10 mg, 10 mg, Oral, Daily, Lv Schaffer    aspirin (ECOTRIN LOW STRENGTH) EC tablet 81 mg, 81 mg, Oral, Daily, Lv Schaffer, 81 mg at 02/10/25 0814    atorvastatin (LIPITOR) tablet 80 mg, 80 mg, Oral, Daily With Dinner, Lv Schaffer, 80 mg at 02/09/25 1748    cloNIDine (CATAPRES) tablet 0.1 mg, 0.1 mg, Oral, Daily, Emile Huggins MD, 0.1 mg at 02/09/25 1748    ezetimibe (ZETIA) tablet 10 mg, 10 mg, Oral, Daily, Lv Schaffer, 10 mg at 02/10/25 0813    gabapentin (NEURONTIN) capsule 100 mg, 100 mg, Oral, TID, Lv Schaffer, 100 mg at 02/10/25 0813    heparin (porcine) 25,000 units in 0.45% NaCl 250 mL infusion (premix), 3-20 Units/kg/hr (Order-Specific), Intravenous, Titrated, Lv Schaffer, Last Rate: 10.5 mL/hr at 02/10/25 0732, 14 Units/kg/hr at 02/10/25 0732    heparin (porcine) injection 2,000 Units, 2,000 Units, Intravenous, Q6H PRN, Lv Schaffer, 2,000 Units at 02/10/25 0734    heparin (porcine) injection 4,000 Units, 4,000 Units, Intravenous, Q6H PRN, Lv Schaffer    insulin aspart protamine-insulin aspart (NovoLOG 70/30) 100 units/mL subcutaneous injection 15 Units, 15 Units, Subcutaneous, BID AC, SHIVA Lyn    insulin lispro (HumALOG/ADMELOG) 100 units/mL subcutaneous injection 1-6 Units, 1-6 Units, Subcutaneous, TID AC, 2 Units at 02/09/25 1155 **AND** Fingerstick Glucose (POCT), , , TID AC, Lv Schaffer    insulin lispro (HumALOG/ADMELOG) 100 units/mL subcutaneous injection 1-6 Units, 1-6 Units, Subcutaneous, HS, Lv Schaffer    insulin lispro (HumALOG/ADMELOG) 100 units/mL subcutaneous injection 1-6 Units, 1-6 Units, Subcutaneous, 0200, SHIVA Lyn    [Held by provider] losartan (COZAAR) tablet 25 mg, 25 mg, Oral, Daily, Emile Huggins MD    [Held by provider] metoprolol tartrate (LOPRESSOR) partial tablet 12.5 mg, 12.5 mg, Oral, Q12H VINAYAK, Emile Huggins MD    pantoprazole (PROTONIX) EC tablet 40 mg, 40 mg, Oral,  Daily, Lv Schaffer, 40 mg at 02/10/25 0814    [Held by provider] terazosin (HYTRIN) capsule 10 mg, 10 mg, Oral, HS, Lv Schaffer    ticagrelor (BRILINTA) tablet 90 mg, 90 mg, Oral, Q12H VINAYAK, Lv Schaffer, 90 mg at 02/10/25 0815    traMADol (ULTRAM) tablet 50 mg, 50 mg, Oral, Q12H PRN, Lv Schaffer     Labs & Results:        Results from last 7 days   Lab Units 02/10/25  0445 02/09/25  0946 02/08/25  0520   WBC Thousand/uL 7.95 10.00 8.16   HEMOGLOBIN g/dL 11.5* 10.7* 12.3   HEMATOCRIT % 35.8* 33.0* 37.8   PLATELETS Thousands/uL 152 150 155         Results from last 7 days   Lab Units 02/10/25  0445 02/09/25  0946 02/08/25  0520 02/07/25  1016   POTASSIUM mmol/L 4.2 4.3 5.2 5.2   CHLORIDE mmol/L 103 102 104 99   CO2 mmol/L 28 23 25 32   BUN mg/dL 22 27* 20 18   CREATININE mg/dL 0.89 0.99 0.91 0.94   CALCIUM mg/dL 9.2 8.7 9.2 10.3*   ALK PHOS U/L  --   --   --  69   ALT U/L  --   --   --  34   AST U/L  --   --   --  220*     Results from last 7 days   Lab Units 02/10/25  0445 02/09/25  0451 02/08/25  0520 02/07/25  1646 02/07/25  1025   INR   --   --   --   --  0.96   PTT seconds 58* 86* 63*   < > 25    < > = values in this interval not displayed.     Results from last 7 days   Lab Units 02/10/25  0445 02/08/25  0520   MAGNESIUM mg/dL 2.0 1.7*       Echo: personally reviewed - EF 55%, G1DD, AK basal to mid inferolateral and apical lateral walls with HK basal inferior wall.      EKG personally reviewed by Mehul Briggs MD.

## 2025-02-10 NOTE — ASSESSMENT & PLAN NOTE
Lab Results   Component Value Date    HGBA1C 11.4 (A) 12/17/2024     Recent Labs     02/09/25  2150 02/09/25  2218 02/10/25  0152 02/10/25  0744   POCGLU 62* 108 125 110     Uncontrolled per A1c above  Home regimen: NovoLog 70/30 50 U BID, Mounjaro 7.5 subQ weekly, and Jardiance 25 q day   70/30 reduced to  15 u BID w/ noted hypoglycemia, titrate PRN   C/w SSI AC/QHS  Consistent carb diet

## 2025-02-10 NOTE — ASSESSMENT & PLAN NOTE
BP soft but stable  C/w Norvasc 10 mg daily, reduced clonidine from 0.2 to 0.1 mg twice daily, reduced losartan from 100 to 25 mg daily, and terazosin 10 mg daily with hold parameters

## 2025-02-10 NOTE — UTILIZATION REVIEW
Initial Clinical Review    Admission: Date/Time/Statement:   Admission Orders (From admission, onward)       Ordered        02/07/25 2010  Inpatient Admission  Once                          Orders Placed This Encounter   Procedures    Inpatient Admission     Standing Status:   Standing     Number of Occurrences:   1     Level of Care:   Med Surg [16]     Estimated length of stay:   More than 2 Midnights     Certification:   I certify that inpatient services are medically necessary for this patient for a duration of greater than two midnights. See H&P and MD Progress Notes for additional information about the patient's course of treatment.     Initial Presentation: 74 y.o. male with PMHx of HTN, HLD, T2 DM, CKD, GERD presents to [facility] as a transfer from [facility] where he initially presented on 2/7 with c/o chest pain he's been experiencing for ~ 3 days. Cannot reliably state if it is better or worse with exertion. In ED troponin elevated - troponin level 22973 x3, EKG with ST depressions in V1 and V2, possibly some moderately elevated ST segments in V3 through V6. Given Brilanta, asa, started on Hep gtt and transferred to [facility] for cardiology eval and further tx.  Admitted Inpatient to [facility] Unit with Chest pain - Telemetry. Continue to trend troponins. Cons carb diet. Continue PTA po meds, insulin, accu-cheks w/ssi. Npo after mdn. Cardiology consulted.     Anticipated Length of Stay/Certification Statement: Patient will be admitted on an inpatient basis with an anticipated length of stay of greater than 2 midnights secondary to MI.     Cardiology consult -- Chest pains with ST elevations in the lateral chest leads.   Plan: continue aspirin, Brilinta, Heparin drip, high intensity Lipitor. BPs are soft at this time, hold antihypertensives except for clonidine and decrease dose of losartan 25 mg from tomorrow. Start Lopressor 12.5 mg BID if ectopy is noted on telemetry or if BP control is needed. Continue telemetry.  F/u echocardiogram. Left heart catheterization on Monday (2/10)    Date: 2/8   Day 2: pt reports neck discomfort and requested warm towel. Low grade temp. Tylenol prn. Blood cxs, lactic acid, repeat procal and UA ordered. NSR/SB on telemetry.     Date: 2/9  Day 3: Has surpassed a 2nd midnight with active treatments and services for chest pain with elevated troponins.  No complaints. WBC, LA, procal wnl. BPs have been. Start to wean down clonidine to 0.1 mg once a day now. Check TSH and AM cortisol. Continue po meds, cons carb diet, insulin, accu-cheks w/ ssi. Supportive are. Npo after mdn for cardiac in AM.      Scheduled Medications:  [Held by provider] amLODIPine, 10 mg, Oral, Daily  aspirin, 81 mg, Oral, Daily  atorvastatin, 80 mg, Oral, Daily With Dinner  cloNIDine, 0.1 mg, Oral, Daily  ezetimibe, 10 mg, Oral, Daily  gabapentin, 100 mg, Oral, TID  insulin aspart protamine-insulin aspart, 15 Units, Subcutaneous, BID AC  insulin lispro, 1-6 Units, Subcutaneous, TID AC  insulin lispro, 1-6 Units, Subcutaneous, HS  insulin lispro, 1-6 Units, Subcutaneous, 0200  [Held by provider] losartan, 25 mg, Oral, Daily  [Held by provider] metoprolol tartrate, 12.5 mg, Oral, Q12H VINAYAK  pantoprazole, 40 mg, Oral, Daily  senna-docusate sodium, 1 tablet, Oral, After Breakfast  [Held by provider] terazosin, 10 mg, Oral, HS  ticagrelor, 90 mg, Oral, Q12H VINAYAK    Continuous IV Infusions:  sodium chloride, 100 mL/hr, Intravenous, Continuous    heparin (porcine) 25,000 units in 0.45% NaCl 250 mL infusion (premix)  Rate: 2.3-15 mL/hr Dose: 3-20 Units/kg/hr  Weight Dosing Info: 75 kg (Order-Specific)  Freq: Titrated Route: IV  Last Dose: Stopped (02/10/25 1200)  Start: 02/07/25 2030 End: 02/10/25 1249    PRN Meds:  acetaminophen, 650 mg, Oral, Q6H PRN  acetaminophen, 975 mg, Oral, Once PRN  albuterol, 2 puff, Inhalation, Q4H PRN  nitroglycerin, 0.4 mg, Sublingual, Once PRN  ondansetron, 4 mg, Intravenous, Once PRN  oxyCODONE-acetaminophen,  1 tablet, Oral, Q4H PRN  traMADol, 50 mg, Oral, Q12H PRN      ED Triage Vitals   Temperature Pulse Respirations Blood Pressure SpO2 Pain Score   02/1948 02/1948 02/1948 02/1948 02/1948 02/07/25 2000   99.3 °F (37.4 °C) 69 16 131/98 98 % No Pain     Weight (last 2 days)       Date/Time Weight    02/08/25 11:18:55 75.3 (166)            Vital Signs (last 3 days)       Date/Time Temp Pulse Resp BP MAP (mmHg) SpO2 Calculated FIO2 (%) - Nasal Cannula Nasal Cannula O2 Flow Rate (L/min) O2 Device Patient Position - Orthostatic VS Metcalf Coma Scale Score Pain    02/10/25 1330 -- 61 -- 105/62 76 98 % -- -- -- -- -- --    02/10/25 13:29:43 -- 63 18 105/62 76 97 % -- -- -- Lying -- --    02/10/25 1305 -- 54 -- 107/56 73 98 % -- -- -- -- -- --    02/10/25 12:49:56 -- 57 16 106/56 73 97 % -- -- -- -- -- --    02/10/25 1214 -- 57 -- 102/59 73 91 % -- -- -- -- -- No Pain    02/10/25 12:01:07 -- 57 17 102/59 73 97 % -- -- -- -- -- --    02/10/25 11:33:44 -- 63 -- 102/59 73 95 % -- -- -- -- -- --    02/10/25 11:09:52 -- -- -- -- -- -- -- -- -- -- -- No Pain    02/10/25 10:22:21 -- -- -- -- -- -- -- -- -- -- -- No Pain    02/10/25 10:22:07 -- -- -- -- -- -- 28 2 L/min Nasal cannula -- -- --    02/10/25 0812 -- -- -- -- -- -- -- -- -- -- -- Med Not Given for Pain - for MAR use only    02/10/25 0800 -- -- -- -- -- -- -- -- -- -- 15 No Pain    02/10/25 07:05:17 100.3 °F (37.9 °C) 76 18 108/65 79 99 % -- -- None (Room air) Lying -- --    02/10/25 02:04:23 100.3 °F (37.9 °C) 73 -- 101/47 65 96 % -- -- -- Lying -- --    02/09/25 22:20:50 98.7 °F (37.1 °C) 68 -- 106/56 73 97 % -- -- None (Room air) Lying -- --    02/09/25 2030 -- -- -- -- -- 96 % -- -- None (Room air) -- 15 No Pain    02/09/25 15:13:44 98 °F (36.7 °C) 71 -- 118/60 79 98 % -- -- None (Room air) Lying -- --    02/09/25 11:25:50 98.8 °F (37.1 °C) 66 18 104/58 73 96 % -- -- None (Room air) Lying -- --    02/09/25 0810 -- -- -- -- -- -- -- -- -- --  -- Med Not Given for Pain - for MAR use only    02/09/25 0800 -- -- -- -- -- -- -- -- -- -- 15 No Pain    02/09/25 07:30:20 99.5 °F (37.5 °C) 66 18 88/47 61 96 % -- -- None (Room air) Lying -- --    02/09/25 02:37:09 -- 62 19 97/55 69 97 % -- -- -- -- -- --    02/08/25 22:42:56 99.1 °F (37.3 °C) 64 18 109/63 78 96 % -- -- None (Room air) Lying -- --    02/08/25 2137 -- -- -- -- -- -- -- -- -- -- -- Med Not Given for Pain - for MAR use only    02/08/25 2030 -- -- -- -- -- 96 % -- -- None (Room air) -- 15 No Pain    02/08/25 19:39:27 100.8 °F (38.2 °C) 67 -- 95/50 65 97 % -- -- -- -- -- --    02/08/25 19:09:19 101 °F (38.3 °C) 68 18 91/47 62 97 % -- -- None (Room air) Lying -- --    02/08/25 15:03:29 98.9 °F (37.2 °C) 59 18 90/50 63 96 % -- -- None (Room air) Lying -- --    02/08/25 11:18:55 98.1 °F (36.7 °C) 60 18 82/41 55 -- -- -- -- -- -- --    02/08/25 0800 -- -- -- -- -- -- -- -- -- -- 15 No Pain    02/08/25 07:50:47 98.7 °F (37.1 °C) 60 18 97/52 67 97 % -- -- None (Room air) Lying -- --    02/08/25 02:25:50 98.4 °F (36.9 °C) 60 -- 99/55 70 98 % -- -- -- Lying -- --    02/07/25 2300 -- -- -- -- -- -- -- -- None (Room air) Lying -- --    02/07/25 22:57:23 100 °F (37.8 °C) 59 -- 103/56 72 96 % -- -- -- -- -- --    02/07/25 2038 -- -- -- -- -- -- -- -- -- -- -- No Pain    02/07/25 2000 -- -- -- -- -- 93 % -- -- None (Room air) -- 15 No Pain    02/07/25 19:48:11 99.3 °F (37.4 °C) 69 16 131/98 109 98 % -- -- -- -- -- --            Pertinent Labs/Diagnostic Test Results:   Radiology:  No orders to display     Cardiology:  Cardiac catheterization   Final Result by Jada Cueto DO (02/10 8370)        S/P PCI with DARLENE x 1 to the OM 80% lesion and likely culprit for his    late-presenting NSTEMI     Notable small vessel, diabetic CAD throughout the coronary system,    reflective of C6d-hwdccu above 10% since 2018     Severe distal LAD disease in a sub 2-mm segment of the vessel that is    not amenabel to PCI      Severe Ostial and Proximal RI disease in a sub 2-mm vessel that is not    amenable to PCI     RCA with moderate CAD and distal RPDA with severe disease in a sub-2 mm    segment that is not amenable to PCI         Echo complete w/ contrast if indicated   Final Result by Denver Connolly MD (02/08 1639)        Left Ventricle: Left ventricular cavity size is normal. Wall thickness    is normal. The left ventricular ejection fraction is 55%. Systolic    function is normal. Diastolic function is mildly abnormal, consistent with    grade I (abnormal) relaxation.     The following segments are akinetic: basal inferolateral, mid    inferolateral and apical lateral.     The following segments are hypokinetic: basal inferior.     All other segments are normal.     Right Ventricle: Right ventricular cavity size is normal. Systolic    function is normal.     Aortic Valve: There is aortic valve sclerosis.           Results from last 7 days   Lab Units 02/10/25  0822 02/08/25  2018   SARS-COV-2  Not Detected Negative     Results from last 7 days   Lab Units 02/10/25  0445 02/09/25  0946 02/08/25  0520 02/07/25  1016   WBC Thousand/uL 7.95 10.00 8.16 7.72   HEMOGLOBIN g/dL 11.5* 10.7* 12.3 13.9   HEMATOCRIT % 35.8* 33.0* 37.8 42.7   PLATELETS Thousands/uL 152 150 155 168   TOTAL NEUT ABS Thousands/µL  --   --   --  3.82     Results from last 7 days   Lab Units 02/10/25  0445 02/09/25  0946 02/08/25  0520 02/07/25  1016   SODIUM mmol/L 137 132* 136 137   POTASSIUM mmol/L 4.2 4.3 5.2 5.2   CHLORIDE mmol/L 103 102 104 99   CO2 mmol/L 28 23 25 32   ANION GAP mmol/L 6 7 7 6   BUN mg/dL 22 27* 20 18   CREATININE mg/dL 0.89 0.99 0.91 0.94   EGFR ml/min/1.73sq m 83 73 81 78   CALCIUM mg/dL 9.2 8.7 9.2 10.3*   MAGNESIUM mg/dL 2.0  --  1.7*  --      Results from last 7 days   Lab Units 02/07/25  1016   AST U/L 220*   ALT U/L 34   ALK PHOS U/L 69   TOTAL PROTEIN g/dL 8.1   ALBUMIN g/dL 4.1   TOTAL BILIRUBIN mg/dL 0.43     Results from last 7  days   Lab Units 02/10/25  1158 02/10/25  0744 02/10/25  0152 02/09/25  2218 02/09/25  2150 02/09/25  1659 02/09/25  1124 02/09/25  0755 02/08/25  2106 02/08/25  1708 02/08/25  1116 02/08/25  0750   POC GLUCOSE mg/dl 136 110 125 108 62* 136 193* 169* 156* 69 170* 279*     Results from last 7 days   Lab Units 02/10/25  0445 02/09/25  0946 02/08/25  0520 02/07/25  1016   GLUCOSE RANDOM mg/dL 114 208* 264* 206*       Results from last 7 days   Lab Units 02/07/25  1429 02/07/25  1224 02/07/25  1016   HS TNI 0HR ng/L  --   --  >22,973*   HS TNI 2HR ng/L  --  >22,973*  --    HS TNI 4HR ng/L >22,973*  --   --      Results from last 7 days   Lab Units 02/10/25  0445 02/09/25  0451 02/08/25  0520 02/07/25  1646 02/07/25  1025   PROTIME seconds  --   --   --   --  13.1   INR   --   --   --   --  0.96   PTT seconds 58* 86* 63*   < > 25    < > = values in this interval not displayed.     Results from last 7 days   Lab Units 02/10/25  0445   TSH 3RD GENERATON uIU/mL 1.477     Results from last 7 days   Lab Units 02/09/25  0451 02/08/25  2050   PROCALCITONIN ng/ml 0.17 0.15     Results from last 7 days   Lab Units 02/08/25  2050   LACTIC ACID mmol/L 1.5       Results from last 7 days   Lab Units 02/07/25  1016   BNP pg/mL 258*       Results from last 7 days   Lab Units 02/08/25  2110   CLARITY UA  Clear   COLOR UA  Light Orange   SPEC GRAV UA  1.026   PH UA  5.5   GLUCOSE UA mg/dl 500 (1/2%)*   KETONES UA mg/dl Trace*   BLOOD UA  Negative   PROTEIN UA mg/dl 30 (1+)*   NITRITE UA  Negative   BILIRUBIN UA  Negative   UROBILINOGEN UA (BE) mg/dl 3.0*   LEUKOCYTES UA  Negative   WBC UA /hpf 1-2   RBC UA /hpf 1-2   BACTERIA UA /hpf None Seen   EPITHELIAL CELLS WET PREP /hpf Occasional   MUCUS THREADS  Moderate*     Results from last 7 days   Lab Units 02/10/25  0822 02/08/25 2018   INFLUENZA A PCR   --  Negative   INFLUENZA B PCR   --  Negative   INFLUENZA B  Not Detected  --    RSV PCR   --  Negative   RESPIRATORY SYNCYTIAL VIRUS   Not Detected  --      Results from last 7 days   Lab Units 02/10/25  0822   ADENOVIRUS  Not Detected   BORDETELLA PARAPERTUSSIS  Not Detected   BORDETELLA PERTUSSIS  Not Detected   CHLAMYDIA PNEUMONIAE  Not Detected   CORONAVIRUS 229E  Not Detected   CORONAVIRUS HKU1  Not Detected   CORONAVIRUS NL63  Not Detected   CORONAVIRUS OC43  Detected*   METAPNEUMOVIRUS  Not Detected   RHINOVIRUS  Not Detected   MYCOPLASMA PNEUMONIAE  Not Detected   PARAINFLUENZA 1  Not Detected   PARAINFLUENZA 2  Not Detected   PARAINFLUENZA 3  Not Detected   PARAINFLUENZA 4  Not Detected       Results from last 7 days   Lab Units 02/08/25  2050   BLOOD CULTURE  No Growth at 24 hrs.  No Growth at 24 hrs.         Past Medical History:   Diagnosis Date    Diabetes mellitus (HCC)      Present on Admission:   Essential hypertension   Mixed hyperlipidemia   Chronic midline low back pain with right-sided sciatica   CKD (chronic kidney disease) stage 2, GFR 60-89 ml/min   Peripheral polyneuropathy   Gastroesophageal reflux disease      Admitting Diagnosis: Chest pain [R07.9]  Age/Sex: 74 y.o. male    Network Utilization Review Department  ATTENTION: Please call with any questions or concerns to [phone] and carefully listen to the prompts so that you are directed to the right person. All voicemails are confidential.   For Discharge needs, contact Care Management DC Support Team at [phone] opt. [phone]  Send all requests for admission clinical reviews, approved or denied determinations and any other requests to dedicated fax number below belonging to the campus where the patient is receiving treatment. List of dedicated fax numbers for the Facilities:  FACILITY NAME UR FAX NUMBER   ADMISSION DENIALS (Administrative/Medical Necessity) [phone]   DISCHARGE SUPPORT TEAM (NETWORK) [phone]   PARENT CHILD HEALTH (Maternity/NICU/Pediatrics) [phone]   [facility] [phone]   [facility]  [place] [phone]   [facility] [phone]   [facility] [phone]   [facility] [phone]   [facility] [phone]   [facility] [phone]   [facility] [phone]   [facility] [phone]   [facility] [phone]   [facility] [phone]   [facility] [phone]

## 2025-02-10 NOTE — QUICK NOTE
Notified pt with hypoglycemia, bs 62.   Will reduce bid 70/30 to 15 units BID. Will need to monitor titrate up as needed. Pt has already received 25 units at this time . Will place 2 am blood sugar chk

## 2025-02-10 NOTE — ASSESSMENT & PLAN NOTE
Lab Results   Component Value Date    HGBA1C 11.4 (A) 12/17/2024     Management as per primary team  Recent Labs     02/09/25  2150 02/09/25  2218 02/10/25  0152 02/10/25  0744   POCGLU 62* 108 125 110       Blood Sugar Average: Last 72 hrs:  (P) [id number]

## 2025-02-10 NOTE — ASSESSMENT & PLAN NOTE
Tmax 101 °F noted on 02/08; recent fever of 100.3 °F noted on 02/10  Unclear etiology; no specific infectious complaints  COVID/FLU/RSV negative  LA WNL; no leukocytosis  Procal negative x 2  BCx w/o growth x 24 hrs   CXR benign   UA benign   Continue to monitor off ABX   Request RP2 panel

## 2025-02-10 NOTE — PLAN OF CARE
Problem: PAIN - ADULT  Goal: Verbalizes/displays adequate comfort level or baseline comfort level  Description: Interventions:  - Encourage patient to monitor pain and request assistance  - Assess pain using appropriate pain scale  - Administer analgesics based on type and severity of pain and evaluate response  - Implement non-pharmacological measures as appropriate and evaluate response  - Consider cultural and social influences on pain and pain management  - Notify physician/advanced practitioner if interventions unsuccessful or patient reports new pain  Outcome: Progressing     Problem: INFECTION - ADULT  Goal: Absence or prevention of progression during hospitalization  Description: INTERVENTIONS:  - Assess and monitor for signs and symptoms of infection  - Monitor lab/diagnostic results  - Monitor all insertion sites, i.e. indwelling lines, tubes, and drains  - Monitor endotracheal if appropriate and nasal secretions for changes in amount and color  - [place] appropriate cooling/warming therapies per order  - Administer medications as ordered  - Instruct and encourage patient and family to use good hand hygiene technique  - Identify and instruct in appropriate isolation precautions for identified infection/condition  Outcome: Progressing  Goal: Absence of fever/infection during neutropenic period  Description: INTERVENTIONS:  - Monitor WBC    Outcome: Progressing     Problem: SAFETY ADULT  Goal: Patient will remain free of falls  Description: INTERVENTIONS:  - Educate patient/family on patient safety including physical limitations  - Instruct patient to call for assistance with activity   - Consult OT/PT to assist with strengthening/mobility   - Keep Call bell within reach  - Keep bed low and locked with side rails adjusted as appropriate  - Keep care items and personal belongings within reach  - Initiate and maintain comfort rounds  - Make Fall Risk Sign visible to staff  - Offer Toileting every 2 Hours,  in advance of need  - Initiate/Maintain alarm  - Obtain necessary fall risk management equipment:   - Apply yellow socks and bracelet for high fall risk patients  - Consider moving patient to room near nurses station  Outcome: Progressing  Goal: Maintain or return to baseline ADL function  Description: INTERVENTIONS:  -  Assess patient's ability to carry out ADLs; assess patient's baseline for ADL function and identify physical deficits which impact ability to perform ADLs (bathing, care of mouth/teeth, toileting, grooming, dressing, etc.)  - Assess/evaluate cause of self-care deficits   - Assess range of motion  - Assess patient's mobility; develop plan if impaired  - Assess patient's need for assistive devices and provide as appropriate  - Encourage maximum independence but intervene and supervise when necessary  - Involve family in performance of ADLs  - Assess for home care needs following discharge   - Consider OT consult to assist with ADL evaluation and planning for discharge  - Provide patient education as appropriate  Outcome: Progressing  Goal: Maintains/Returns to pre admission functional level  Description: INTERVENTIONS:  - Perform AM-PAC 6 Click Basic Mobility/ Daily Activity assessment daily.  - Set and communicate daily mobility goal to care team and patient/family/caregiver.   - Collaborate with rehabilitation services on mobility goals if consulted  - Perform Range of Motion   - Reposition patient every 2 hours.  - Stand patient   - Ambulate patient  - Out of bed to chair  - Out of bed for meals  - Out of bed for toileting  - Record patient progress and toleration of activity level   Outcome: Progressing     Problem: DISCHARGE PLANNING  Goal: Discharge to home or other facility with appropriate resources  Description: INTERVENTIONS:  - Identify barriers to discharge w/patient and caregiver  - Arrange for needed discharge resources and transportation as appropriate  - Identify discharge learning  needs (meds, wound care, etc.)  - Arrange for interpretive services to assist at discharge as needed  - Refer to Case Management Department for coordinating discharge planning if the patient needs post-hospital services based on physician/advanced practitioner order or complex needs related to functional status, cognitive ability, or social support system  Outcome: Progressing     Problem: Knowledge Deficit  Goal: Patient/family/caregiver demonstrates understanding of disease process, treatment plan, medications, and discharge instructions  Description: Complete learning assessment and assess knowledge base.  Interventions:  - Provide teaching at level of understanding  - Provide teaching via preferred learning methods  Outcome: Progressing

## 2025-02-10 NOTE — ASSESSMENT & PLAN NOTE
Lab Results   Component Value Date    EGFR 83 02/10/2025    EGFR 73 02/09/2025    EGFR 81 02/08/2025    CREATININE 0.89 02/10/2025    CREATININE 0.99 02/09/2025    CREATININE 0.91 02/08/2025     Renal function within baseline, continue to monitor w/ cardiac cath

## 2025-02-10 NOTE — PROGRESS NOTES
Progress Note - Hospitalist   Name: Wagner Ochoa 74 y.o. male I MRN: [id number]  Unit/Bed#: [facility] 405-01 I Date of Admission: 2/7/2025   Date of Service: 2/10/2025 I Hospital Day: 3    Assessment & Plan  Chest pain  PMH of HTN, HLD, & DM 2 who presented to ED after experiencing chest pain for about 3 days.   Patient could not reliably state if it was better or worse with exertion;  no current  CP   EKG with ST depressions in V1 and V2, possibly some moderately elevated ST segments in V3 through V6  Notably, troponin level >22,973 x 3.  Transferred from  ED to [facility] for  interventional evaluation  C/w medical therapy w/ Brilinta, aspirin, statin, Zetia, heparin gtt  Pending cardiac catheterization later today (02/10)   Cardiology following, appreciate ongoing recs  C/w Tele  Fever  Tmax 101 °F noted on 02/08; recent fever of 100.3 °F noted on 02/10  Unclear etiology; no specific infectious complaints  COVID/FLU/RSV negative  LA WNL; no leukocytosis  Procal negative x 2  BCx w/o growth x 24 hrs   CXR benign   UA benign   Continue to monitor off ABX   Request RP2 panel  Essential hypertension  BP soft but stable  C/w Norvasc 10 mg daily, reduced clonidine from 0.2 to 0.1 mg twice daily, reduced losartan from 100 to 25 mg daily, and terazosin 10 mg daily with hold parameters  Type 2 diabetes mellitus with hyperglycemia, with long-term current use of insulin ([facility])  Lab Results   Component Value Date    HGBA1C 11.4 (A) 12/17/2024     Recent Labs     02/09/25  2150 02/09/25  2218 02/10/25  0152 02/10/25  0744   POCGLU 62* 108 125 110     Uncontrolled per A1c above  Home regimen: NovoLog 70/30 50 U BID, Mounjaro 7.5 subQ weekly, and Jardiance 25 q day   70/30 reduced to  15 u BID w/ noted hypoglycemia, titrate PRN   C/w SSI AC/QHS  Consistent carb diet  CKD (chronic kidney disease) stage 2, GFR 60-89 ml/min  Lab Results   Component Value Date    EGFR 83 02/10/2025    EGFR 73 02/09/2025    EGFR 81 02/08/2025     CREATININE 0.89 02/10/2025    CREATININE 0.99 02/09/2025    CREATININE 0.91 02/08/2025     Renal function within baseline, continue to monitor w/ cardiac cath   Mixed hyperlipidemia  C/w atorvastatin 80 mg nightly and Zetia 10 mg daily  Gastroesophageal reflux disease  C/w Protonix 40 mg daily  Chronic midline low back pain with right-sided sciatica  C/w tramadol 50 mg daily  Peripheral polyneuropathy  C/w gabapentin 100 mg TID     VTE Pharmacologic Prophylaxis:   Moderate Risk (Score 3-4) - Pharmacological DVT Prophylaxis Ordered: heparin drip.    Mobility:   Basic Mobility Inpatient Raw Score: 24  JH-HLM Goal: 8: Walk 250 feet or more  JH-HLM Achieved: 4: Move to chair/commode  JH-HLM Goal achieved. Continue to encourage appropriate mobility.    Patient Centered Rounds: I performed bedside rounds with nursing staff today.   Discussions with Specialists or Other Care Team Provider: Care briefly reviewed with cardiology, case management, nursing    Education and Discussions with Family / Patient: Attempted to update  (wife) via phone. Left voicemail.     Current Length of Stay: 3 day(s)  Current Patient Status: Inpatient   Certification Statement: The patient will continue to require additional inpatient hospital stay due to pending cardiac catheterization and fever workup  Discharge Plan: Anticipate discharge tomorrow to home.    Code Status: Level 1 - Full Code    Subjective   Patient is currently doing okay.  He denies any chest pain, shortness of breath, nausea, vomiting, diaphoresis, abdominal pain, dysuria.  Patient notes his last bowel movement was Friday.  He is passing gas intermittently.  He has noticed some nasal congestion; he denies sick contacts, cough.  He admits to chronic heaviness/numbness in his bilateral feet.    Objective :  Temp:  [98 °F (36.7 °C)-100.3 °F (37.9 °C)] 100.3 °F (37.9 °C)  HR:  [66-76] 76  BP: (101-118)/(47-65) 108/65  Resp:  [18] 18  SpO2:  [96 %-99 %] 99 %  O2  Device: None (Room air)    Body mass index is 26 kg/m².     Input and Output Summary (last 24 hours):     Intake/Output Summary (Last 24 hours) at 2/10/2025 0915  Last data filed at 2/10/2025 0801  Gross per 24 hour   Intake 248 ml   Output 750 ml   Net -502 ml       Physical Exam  Constitutional:       General: He is not in acute distress.     Appearance: He is normal weight. He is not toxic-appearing.   HENT:      Head: Normocephalic.      Right Ear: External ear normal.      Left Ear: External ear normal.      Nose: Nose normal.      Mouth/Throat:      Mouth: Mucous membranes are moist.      Pharynx: Oropharynx is clear.   Eyes:      Extraocular Movements: Extraocular movements intact.      Conjunctiva/sclera: Conjunctivae normal.   Cardiovascular:      Rate and Rhythm: Normal rate and regular rhythm.      Pulses: Normal pulses.      Heart sounds: Normal heart sounds.   Pulmonary:      Effort: Pulmonary effort is normal. No respiratory distress.      Breath sounds: Normal breath sounds. No wheezing or rales.   Abdominal:      General: Abdomen is flat. Bowel sounds are normal. There is no distension.      Palpations: Abdomen is soft.      Tenderness: There is no abdominal tenderness. There is no guarding.   Musculoskeletal:         General: Swelling (Trace lower extremity) present.      Cervical back: Normal range of motion.   Skin:     General: Skin is warm and dry.      Coloration: Skin is not jaundiced.      Findings: No bruising or lesion.   Neurological:      General: No focal deficit present.      Mental Status: He is alert and oriented to person, place, and time. Mental status is at baseline.   Psychiatric:         Mood and Affect: Mood normal.         Behavior: Behavior normal.       Telemetry:  Telemetry Orders (From admission, onward)               24 Hour Telemetry Monitoring  Continuous x 24 Hours (Telem)        Expiring   Question:  Reason for 24 Hour Telemetry  Answer:  Patients with  jace/reese/endocarditis; cardiac contusion                     Telemetry Reviewed: Normal Sinus Rhythm  Indication for Continued Telemetry Use: Awaiting PCI/EP Study/CABG               Lab Results: I have reviewed the following results:   Results from last 7 days   Lab Units 02/10/25  0445 02/08/25  0520 02/07/25  1016   WBC Thousand/uL 7.95   < > 7.72   HEMOGLOBIN g/dL 11.5*   < > 13.9   HEMATOCRIT % 35.8*   < > 42.7   PLATELETS Thousands/uL 152   < > 168   SEGS PCT %  --   --  50   LYMPHO PCT %  --   --  39   MONO PCT %  --   --  9   EOS PCT %  --   --  1    < > = values in this interval not displayed.     Results from last 7 days   Lab Units 02/10/25  0445 02/08/25  0520 02/07/25  1016   SODIUM mmol/L 137   < > 137   POTASSIUM mmol/L 4.2   < > 5.2   CHLORIDE mmol/L 103   < > 99   CO2 mmol/L 28   < > 32   BUN mg/dL 22   < > 18   CREATININE mg/dL 0.89   < > 0.94   ANION GAP mmol/L 6   < > 6   CALCIUM mg/dL 9.2   < > 10.3*   ALBUMIN g/dL  --   --  4.1   TOTAL BILIRUBIN mg/dL  --   --  0.43   ALK PHOS U/L  --   --  69   ALT U/L  --   --  34   AST U/L  --   --  220*   GLUCOSE RANDOM mg/dL 114   < > 206*    < > = values in this interval not displayed.     Results from last 7 days   Lab Units 02/07/25  1025   INR  0.96     Results from last 7 days   Lab Units 02/10/25  0744 02/10/25  0152 02/09/25  2218 02/09/25  2150 02/09/25  1659 02/09/25  1124 02/09/25  0755 02/08/25  2106 02/08/25  1708 02/08/25  1116 02/08/25  0750 02/07/25  2152   POC GLUCOSE mg/dl 110 125 108 62* 136 193* 169* 156* 69 170* 279* 222*         Results from last 7 days   Lab Units 02/09/25  0451 02/08/25  2050   LACTIC ACID mmol/L  --  1.5   PROCALCITONIN ng/ml 0.17 0.15       Recent Cultures (last 7 days):   Results from last 7 days   Lab Units 02/08/25 2050   BLOOD CULTURE  No Growth at 24 hrs.  No Growth at 24 hrs.       Imaging Results Review: I reviewed radiology reports from this admission including: chest xray.  Other Study Results Review:  EKG was reviewed.     Last 24 Hours Medication List:     Current Facility-Administered Medications:     acetaminophen (TYLENOL) tablet 650 mg, Q6H PRN    albuterol (PROVENTIL HFA,VENTOLIN HFA) inhaler 2 puff, Q4H PRN    [Held by provider] amLODIPine (NORVASC) tablet 10 mg, Daily    aspirin (ECOTRIN LOW STRENGTH) EC tablet 81 mg, Daily    atorvastatin (LIPITOR) tablet 80 mg, Daily With Dinner    cloNIDine (CATAPRES) tablet 0.1 mg, Daily    ezetimibe (ZETIA) tablet 10 mg, Daily    gabapentin (NEURONTIN) capsule 100 mg, TID    heparin (porcine) 25,000 units in 0.45% NaCl 250 mL infusion (premix), Titrated, Last Rate: 14 Units/kg/hr (02/10/25 0732)    heparin (porcine) injection 2,000 Units, Q6H PRN    heparin (porcine) injection 4,000 Units, Q6H PRN    insulin aspart protamine-insulin aspart (NovoLOG 70/30) 100 units/mL subcutaneous injection 15 Units, BID AC    insulin lispro (HumALOG/ADMELOG) 100 units/mL subcutaneous injection 1-6 Units, TID AC **AND** Fingerstick Glucose (POCT), TID AC    insulin lispro (HumALOG/ADMELOG) 100 units/mL subcutaneous injection 1-6 Units, HS    insulin lispro (HumALOG/ADMELOG) 100 units/mL subcutaneous injection 1-6 Units, 0200    [Held by provider] losartan (COZAAR) tablet 25 mg, Daily    [Held by provider] metoprolol tartrate (LOPRESSOR) partial tablet 12.5 mg, Q12H VINAYAK    pantoprazole (PROTONIX) EC tablet 40 mg, Daily    [Held by provider] terazosin (HYTRIN) capsule 10 mg, HS    ticagrelor (BRILINTA) tablet 90 mg, Q12H VINAYAK    traMADol (ULTRAM) tablet 50 mg, Q12H PRN    Administrative Statements   Today, Patient Was Seen By: Marlys Dunn PA-C  I have spent a total time of 45 minutes in caring for this patient on the day of the visit/encounter including Diagnostic results, Prognosis, Risks and benefits of tx options, Instructions for management, Patient and family education, Importance of tx compliance, Impressions, Counseling / Coordination of care, Documenting in the medical record,  Reviewing / ordering tests, medicine, procedures  , Obtaining or reviewing history  , and Communicating with other healthcare professionals .    **Please Note: This note may have been constructed using a voice recognition system.**

## 2025-02-10 NOTE — ASSESSMENT & PLAN NOTE
The patient presentrf to the ED complaining of 3 days of persistent chest pain.   Troponin was elevated >23,000 x3 and ECG showed ST depressions in V1-V4 and mild ST elevations in V5/V6.    The case was discussed with interventional cardiology who recommended medical management and transfer to [facility] for cardiac catheterization.    The patient's presentation likely represents a completed myocardial infarction.  Continued on heparin drip.  Continue aspirin and Brilinta.  Continue Lipitor.  Continue amlodipine.  Attempting to add a beta-blocker if BP allows.  Echocardiogram reveals wall motion abnormalities and preserved EF.  Telemetry monitoring.  Plan for cardiac catheterization today.    Maintain NPO after midnight until after cardiac cath

## 2025-02-11 PROBLEM — B34.2 CORONAVIRUS INFECTION: Status: ACTIVE | Noted: 2025-02-11

## 2025-02-11 LAB
ANION GAP SERPL CALCULATED.3IONS-SCNC: 6 MMOL/L (ref 4–13)
BUN SERPL-MCNC: 25 MG/DL (ref 5–25)
CALCIUM SERPL-MCNC: 8.9 MG/DL (ref 8.4–10.2)
CHLORIDE SERPL-SCNC: 106 MMOL/L (ref 96–108)
CO2 SERPL-SCNC: 25 MMOL/L (ref 21–32)
CREAT SERPL-MCNC: 0.97 MG/DL (ref 0.6–1.3)
GFR SERPL CREATININE-BSD FRML MDRD: 75 ML/MIN/1.73SQ M
GLUCOSE SERPL-MCNC: 146 MG/DL (ref 65–140)
GLUCOSE SERPL-MCNC: 246 MG/DL (ref 65–140)
GLUCOSE SERPL-MCNC: 257 MG/DL (ref 65–140)
GLUCOSE SERPL-MCNC: 280 MG/DL (ref 65–140)
POTASSIUM SERPL-SCNC: 3.9 MMOL/L (ref 3.5–5.3)
SODIUM SERPL-SCNC: 137 MMOL/L (ref 135–147)

## 2025-02-11 PROCEDURE — 80048 BASIC METABOLIC PNL TOTAL CA: CPT | Performed by: PHYSICIAN ASSISTANT

## 2025-02-11 PROCEDURE — 99239 HOSP IP/OBS DSCHRG MGMT >30: CPT | Performed by: INTERNAL MEDICINE

## 2025-02-11 PROCEDURE — 99232 SBSQ HOSP IP/OBS MODERATE 35: CPT | Performed by: INTERNAL MEDICINE

## 2025-02-11 PROCEDURE — 82948 REAGENT STRIP/BLOOD GLUCOSE: CPT

## 2025-02-11 RX ORDER — OXYCODONE AND ACETAMINOPHEN 5; 325 MG/1; MG/1
1 TABLET ORAL EVERY 4 HOURS PRN
Refills: 0 | Status: DISCONTINUED | OUTPATIENT
Start: 2025-02-11 | End: 2025-02-11 | Stop reason: HOSPADM

## 2025-02-11 RX ORDER — CLONIDINE HYDROCHLORIDE 0.2 MG/1
0.1 TABLET ORAL 2 TIMES DAILY
Qty: 30 TABLET | Refills: 0 | Status: CANCELLED | OUTPATIENT
Start: 2025-02-11 | End: 2026-02-11

## 2025-02-11 RX ORDER — NITROGLYCERIN 0.4 MG/1
0.4 TABLET SUBLINGUAL ONCE AS NEEDED
Qty: 20 TABLET | Refills: 0 | Status: SHIPPED | OUTPATIENT
Start: 2025-02-11

## 2025-02-11 RX ORDER — INSULIN ASPART 100 [IU]/ML
20 INJECTION, SUSPENSION SUBCUTANEOUS 2 TIMES DAILY WITH MEALS
Start: 2025-02-11

## 2025-02-11 RX ORDER — METOPROLOL TARTRATE 25 MG/1
12.5 TABLET, FILM COATED ORAL EVERY 12 HOURS SCHEDULED
Qty: 30 TABLET | Refills: 0 | Status: SHIPPED | OUTPATIENT
Start: 2025-02-11 | End: 2025-02-17 | Stop reason: SDUPTHER

## 2025-02-11 RX ORDER — ASPIRIN 81 MG/1
81 TABLET, CHEWABLE ORAL DAILY
Qty: 30 TABLET | Refills: 0 | Status: SHIPPED | OUTPATIENT
Start: 2025-02-11 | End: 2025-03-13

## 2025-02-11 RX ORDER — CLONIDINE HYDROCHLORIDE 0.1 MG/1
0.1 TABLET ORAL DAILY
Status: DISCONTINUED | OUTPATIENT
Start: 2025-02-11 | End: 2025-02-11 | Stop reason: HOSPADM

## 2025-02-11 RX ADMIN — Medication 12.5 MG: at 08:39

## 2025-02-11 RX ADMIN — EZETIMIBE 10 MG: 10 TABLET ORAL at 08:39

## 2025-02-11 RX ADMIN — SENNOSIDES AND DOCUSATE SODIUM 1 TABLET: 50; 8.6 TABLET ORAL at 08:38

## 2025-02-11 RX ADMIN — ASPIRIN 81 MG: 81 TABLET, COATED ORAL at 08:38

## 2025-02-11 RX ADMIN — TICAGRELOR 90 MG: 90 TABLET ORAL at 08:39

## 2025-02-11 RX ADMIN — PANTOPRAZOLE SODIUM 40 MG: 40 TABLET, DELAYED RELEASE ORAL at 08:38

## 2025-02-11 RX ADMIN — GABAPENTIN 100 MG: 100 CAPSULE ORAL at 08:39

## 2025-02-11 RX ADMIN — INSULIN LISPRO 3 UNITS: 100 INJECTION, SOLUTION INTRAVENOUS; SUBCUTANEOUS at 01:57

## 2025-02-11 RX ADMIN — INSULIN LISPRO 3 UNITS: 100 INJECTION, SOLUTION INTRAVENOUS; SUBCUTANEOUS at 11:23

## 2025-02-11 RX ADMIN — INSULIN ASPART 15 UNITS: 100 INJECTION, SUSPENSION SUBCUTANEOUS at 08:37

## 2025-02-11 NOTE — ASSESSMENT & PLAN NOTE
Lab Results   Component Value Date    HGBA1C 11.4 (A) 12/17/2024     Management as per primary team  Recent Labs     02/10/25  1158 02/10/25  1648 02/11/25  0150 02/11/25  0749   POCGLU 136 344* 246* 146*       Blood Sugar Average: Last 72 hrs:  (P) [id number]

## 2025-02-11 NOTE — ASSESSMENT & PLAN NOTE
Lab Results   Component Value Date    HGBA1C 11.4 (A) 12/17/2024     Recent Labs     02/10/25  1648 02/11/25  0150 02/11/25  0749 02/11/25  1108   POCGLU 344* 246* 146* 257*     Uncontrolled per A1c above  Home regimen: NovoLog 70/30 50 U BID, Mounjaro 7.5 subQ weekly, and Jardiance 25 q day

## 2025-02-11 NOTE — PLAN OF CARE
Problem: PAIN - ADULT  Goal: Verbalizes/displays adequate comfort level or baseline comfort level  Description: Interventions:  - Encourage patient to monitor pain and request assistance  - Assess pain using appropriate pain scale  - Administer analgesics based on type and severity of pain and evaluate response  - Implement non-pharmacological measures as appropriate and evaluate response  - Consider cultural and social influences on pain and pain management  - Notify physician/advanced practitioner if interventions unsuccessful or patient reports new pain  Outcome: Progressing     Problem: INFECTION - ADULT  Goal: Absence or prevention of progression during hospitalization  Description: INTERVENTIONS:  - Assess and monitor for signs and symptoms of infection  - Monitor lab/diagnostic results  - Monitor all insertion sites, i.e. indwelling lines, tubes, and drains  - Monitor endotracheal if appropriate and nasal secretions for changes in amount and color  - [place] appropriate cooling/warming therapies per order  - Administer medications as ordered  - Instruct and encourage patient and family to use good hand hygiene technique  - Identify and instruct in appropriate isolation precautions for identified infection/condition  Outcome: Progressing  Goal: Absence of fever/infection during neutropenic period  Description: INTERVENTIONS:  - Monitor WBC    Outcome: Progressing     Problem: SAFETY ADULT  Goal: Patient will remain free of falls  Description: INTERVENTIONS:  - Educate patient/family on patient safety including physical limitations  - Instruct patient to call for assistance with activity   - Consult OT/PT to assist with strengthening/mobility   - Keep Call bell within reach  - Keep bed low and locked with side rails adjusted as appropriate  - Keep care items and personal belongings within reach  - Initiate and maintain comfort rounds  - Make Fall Risk Sign visible to staff  - Offer Toileting every  Hours,  in advance of need  - Initiate/Maintain alarm  - Obtain necessary fall risk management equipment:   - Apply yellow socks and bracelet for high fall risk patients  - Consider moving patient to room near nurses station  Outcome: Progressing  Goal: Maintain or return to baseline ADL function  Description: INTERVENTIONS:  -  Assess patient's ability to carry out ADLs; assess patient's baseline for ADL function and identify physical deficits which impact ability to perform ADLs (bathing, care of mouth/teeth, toileting, grooming, dressing, etc.)  - Assess/evaluate cause of self-care deficits   - Assess range of motion  - Assess patient's mobility; develop plan if impaired  - Assess patient's need for assistive devices and provide as appropriate  - Encourage maximum independence but intervene and supervise when necessary  - Involve family in performance of ADLs  - Assess for home care needs following discharge   - Consider OT consult to assist with ADL evaluation and planning for discharge  - Provide patient education as appropriate  Outcome: Progressing  Goal: Maintains/Returns to pre admission functional level  Description: INTERVENTIONS:  - Perform AM-PAC 6 Click Basic Mobility/ Daily Activity assessment daily.  - Set and communicate daily mobility goal to care team and patient/family/caregiver.   - Collaborate with rehabilitation services on mobility goals if consulted  - Perform Range of Motion  times a day.  - Reposition patient every  hours.  - Dangle patient  times a day  - Stand patient times a day  - Ambulate patient  times a day  - Out of bed to chair  times a day   - Out of bed for meals  times a day  - Out of bed for toileting  - Record patient progress and toleration of activity level   Outcome: Progressing     Problem: DISCHARGE PLANNING  Goal: Discharge to home or other facility with appropriate resources  Description: INTERVENTIONS:  - Identify barriers to discharge w/patient and caregiver  - Arrange for  needed discharge resources and transportation as appropriate  - Identify discharge learning needs (meds, wound care, etc.)  - Arrange for interpretive services to assist at discharge as needed  - Refer to Case Management Department for coordinating discharge planning if the patient needs post-hospital services based on physician/advanced practitioner order or complex needs related to functional status, cognitive ability, or social support system  Outcome: Progressing     Problem: Knowledge Deficit  Goal: Patient/family/caregiver demonstrates understanding of disease process, treatment plan, medications, and discharge instructions  Description: Complete learning assessment and assess knowledge base.  Interventions:  - Provide teaching at level of understanding  - Provide teaching via preferred learning methods  Outcome: Progressing      Detail Level: Detailed Quality 110: Preventive Care And Screening: Influenza Immunization: Influenza immunization was not ordered or administered, reason not given Quality 131: Pain Assessment And Follow-Up: No documentation of pain assessment, reason not given Quality 226: Preventive Care And Screening: Tobacco Use: Screening And Cessation Intervention: Patient screened for tobacco use and is an ex/non-smoker

## 2025-02-11 NOTE — ASSESSMENT & PLAN NOTE
The patient presented to the ED complaining of 3 days of persistent chest pain.   Troponin was elevated >23,000 x3 and ECG showed ST depressions in V1-V4 and mild ST elevations in V5/V6.    The case was discussed with interventional cardiology who recommended medical management and transfer to [facility] for cardiac catheterization.    The patient's presentation likely represents a completed myocardial infarction.  Continued on heparin drip.  Continue aspirin and Brilinta.  Continue Lipitor.  Continue amlodipine.  Attempting to add a beta-blocker if BP allows.  Echocardiogram reveals wall motion abnormalities and preserved EF.  Telemetry monitoring.  S/p cardiac catheterization and now s/p DARLENE to OM with small vessel diabetic CAD throughout coronaries.    Continue DAPT  Stable from cardiac standpoint for discharge later today if his blood work reveals stable Cr after cath and Hgb stable with DAPT, along with BP tolerating current regimen/changes in medications

## 2025-02-11 NOTE — ASSESSMENT & PLAN NOTE
Lab Results   Component Value Date    EGFR 75 02/11/2025    EGFR 83 02/10/2025    EGFR 73 02/09/2025    CREATININE 0.97 02/11/2025    CREATININE 0.89 02/10/2025    CREATININE 0.99 02/09/2025     Renal function within baseline, creat stable post cath

## 2025-02-11 NOTE — ASSESSMENT & PLAN NOTE
75 y/o M with PMHx of HTN, HLD, & DM 2 who presented to ED after experiencing chest pain for about 3 days.   EKG with ST depressions in V1 and V2, possibly some moderately elevated ST segments in V3 through V6  Notably, troponin level >22,973 x 3.  Transferred from  ED to [facility] for  interventional evaluation  S/P cardiac cath on 2/10--s/p DARLENE to OM with small vessel diabetic CAD throughout coronaries  Cards input appreciated, felt presentation represents completed MI  Continue Brillinta (price checked on 2/11 and affordable), Aspirin  Statin, zetia  Added BB  DC Norvasc, ARB and clonidine given soft BPs and preference for BB therapy given CAD  Cardiac rehab referral provided for discharge, needs cardiac f/u

## 2025-02-11 NOTE — ASSESSMENT & PLAN NOTE
BP soft but stable  Stop Norvasc and ARB  Reduced clonidine from 0.2 to 0.1 mg twice daily--would stop for discharge per cards recs  BB added

## 2025-02-11 NOTE — ASSESSMENT & PLAN NOTE
Home regimen includes amlodipine, losartan and clonidine.    With lower BPs, holding amlodipine and attempting to wean off clonidine in favor of a B-blocker with CAD.   Would stop amlodipine and add metoprolol  Would stop clonidine after today's dose

## 2025-02-11 NOTE — ASSESSMENT & PLAN NOTE
Lab Results   Component Value Date    EGFR 83 02/10/2025    EGFR 73 02/09/2025    EGFR 81 02/08/2025    CREATININE 0.89 02/10/2025    CREATININE 0.99 02/09/2025    CREATININE 0.91 02/08/2025   Monitor BMP.  Cr stable currently - no labs today thus far, as patient has refused them

## 2025-02-11 NOTE — CASE MANAGEMENT
Case Management Discharge Planning Note    Patient name Wagner Ochoa  Location [facility] 405-01 MRN [id number]  : 1950 Date 2025       Current Admission Date: 2025  Current Admission Diagnosis:Chest pain   Patient Active Problem List    Diagnosis Date Noted Date Diagnosed    Fever 2025     Chest pain 2025     Claudication (HCC) 01/15/2025     Type 2 diabetes mellitus with hyperglycemia, with long-term current use of insulin (HCC) 2024     Pain in both feet 2024     Peripheral polyneuropathy 2023     Vitamin D deficiency 07/15/2021     Erectile dysfunction 04/15/2021     Diabetic polyneuropathy associated with type 2 diabetes mellitus (HCC) 2021     Encounter for immunization 2021     Overweight with body mass index (BMI) of 29 to 29.9 in adult 2020     Wellness examination 2020     Screening for AAA (abdominal aortic aneurysm) 2020     Encounter for screening colonoscopy 2020     Encounter for screening for lung cancer 2020     Hearing deficit, left 2020     CKD (chronic kidney disease) stage 2, GFR 60-89 ml/min 10/06/2019     Mixed hyperlipidemia 2019     Gastroesophageal reflux disease 2019     Type 2 diabetes mellitus with stage 3 chronic kidney disease, with long-term current use of insulin, unspecified whether stage 3a or 3b CKD ([facility]) 2019     Essential hypertension 2019     Right lower quadrant abdominal pain 2019     Chronic midline low back pain with right-sided sciatica 2019     Benign non-nodular prostatic hyperplasia without lower urinary tract symptoms 2016     Left varicocele 2015     Microhematuria 2015       LOS (days): 4  Geometric Mean LOS (GMLOS) (days): 1.7  Days to GMLOS:-1.9     OBJECTIVE:  Risk of Unplanned Readmission Score: 10.38         Current admission status: Inpatient   Preferred Pharmacy:   [facility] [place]  - [address]  [address]  [address]  [place] [address]  Phone: [phone] Fax: [phone]    [facility] [place] - [address]  [address]  [place] [address]  Phone: [phone] Fax: [phone]    Primary Care Provider: Ricardo Presley MD    Primary Insurance: [employer]ARE [facility]  Secondary Insurance: [place] [employer] [facility]    DISCHARGE DETAILS:                                          Other Referral/Resources/Interventions Provided:  Interventions: Prescription Price Check  Referral Comments: [place] checked Brilinta at [facility] per BOB NGUYEN [facility] not able to find pt's insurance information, CM provided information on file. Per [employer] tech, insurance went through with $0 copay. Information relayed to Marlys.

## 2025-02-11 NOTE — ASSESSMENT & PLAN NOTE
Tmax 101 °F noted on 02/08; recent fever of 100.3 °F noted on 02/10  No specific infectious complaints  COVID/FLU/RSV negative  LA WNL; no leukocytosis  Procal negative x 2  BCx w/o growth x 48 hours  CXR benign   UA benign   RP2 panel shows coronavirus, not COVID-19  Supportive care, afebrile over last 24 hours

## 2025-02-11 NOTE — PROGRESS NOTES
Cardiology Progress Note - Wagner Ochoa 74 y.o. male MRN: [id number]    Unit/Bed#: [facility] 405-01 Encounter: [id number]        Assessment & Plan  Chest pain  The patient presented to the ED complaining of 3 days of persistent chest pain.   Troponin was elevated >23,000 x3 and ECG showed ST depressions in V1-V4 and mild ST elevations in V5/V6.    The case was discussed with interventional cardiology who recommended medical management and transfer to [facility] for cardiac catheterization.    The patient's presentation likely represents a completed myocardial infarction.  Continued on heparin drip.  Continue aspirin and Brilinta.  Continue Lipitor.  Continue amlodipine.  Attempting to add a beta-blocker if BP allows.  Echocardiogram reveals wall motion abnormalities and preserved EF.  Telemetry monitoring.  S/p cardiac catheterization and now s/p DARLENE to OM with small vessel diabetic CAD throughout coronaries.    Continue DAPT  Stable from cardiac standpoint for discharge later today if his blood work reveals stable Cr after cath and Hgb stable with DAPT, along with BP tolerating current regimen/changes in medications  Mixed hyperlipidemia  Continue statin and Zetia.  Essential hypertension  Home regimen includes amlodipine, losartan and clonidine.    With lower BPs, holding amlodipine and attempting to wean off clonidine in favor of a B-blocker with CAD.   Would stop amlodipine and add metoprolol  Would stop clonidine after today's dose  CKD (chronic kidney disease) stage 2, GFR 60-89 ml/min  Lab Results   Component Value Date    EGFR 83 02/10/2025    EGFR 73 02/09/2025    EGFR 81 02/08/2025    CREATININE 0.89 02/10/2025    CREATININE 0.99 02/09/2025    CREATININE 0.91 02/08/2025   Monitor BMP.  Cr stable currently - no labs today thus far, as patient has refused them  Gastroesophageal reflux disease  Continue Protonix.  Type 2 diabetes mellitus with hyperglycemia, with long-term current use of insulin ([facility])  Lab Results  "  Component Value Date    HGBA1C 11.4 (A) 12/17/2024     Management as per primary team  Recent Labs     02/10/25  1158 02/10/25  1648 02/11/25  0150 02/11/25  0749   POCGLU 136 344* 246* 146*       Blood Sugar Average: Last 72 hrs:  (P) [id number]  Chronic midline low back pain with right-sided sciatica  Continued outpatient follow up and management  Peripheral polyneuropathy  As related to DM, continue management as per primary team  Fever  Work-up and management as per primary team  WBC count and procalcitonin levels appear normal    Subjective:   Patient seen and examined.  No significant events overnight.  ; pertinent negatives - chest pain, chest pressure/discomfort, dyspnea, irregular heart beat, palpitations, and paroxysmal nocturnal dyspnea.    Objective:     Vitals: Blood pressure 108/57, pulse 63, temperature 98.9 °F (37.2 °C), resp. rate 18, height 5' 7\" (1.702 m), weight 75.3 kg (166 lb), SpO2 99%., Body mass index is 26 kg/m².,   Orthostatic Blood Pressures      Flowsheet Row Most Recent Value   Blood Pressure 108/57 filed at 02/11/2025 0732   Patient Position - Orthostatic VS Lying filed at 02/11/2025 0151              Intake/Output Summary (Last 24 hours) at 2/11/2025 0825  Last data filed at 2/11/2025 0701  Gross per 24 hour   Intake 348.33 ml   Output 200 ml   Net 148.33 ml       TELE: No significant arrhythmias seen.    Physical Exam:    GEN: Wagner Ochoa appears well, alert and oriented x 3, pleasant and cooperative   HEENT: pupils equal, round, and reactive to light; extraocular muscles intact  NECK: supple, no carotid bruits   HEART: regular rhythm, normal S1 and S2, +systolic murmur, no clicks, gallops or rubs   LUNGS: clear to auscultation bilaterally; no wheezes, rales, or rhonchi   ABDOMEN: normal bowel sounds, soft, no tenderness, no distention  EXTREMITIES: peripheral pulses normal; no clubbing, cyanosis, or edema  NEURO: no focal findings   SKIN: normal without suspicious " lesions on exposed skin      Medications:      Current Facility-Administered Medications:     acetaminophen (TYLENOL) tablet 650 mg, 650 mg, Oral, Q6H PRN, LETA Lynn-KIRAN, 650 mg at 02/10/25 0812    albuterol (PROVENTIL HFA,VENTOLIN HFA) inhaler 2 puff, 2 puff, Inhalation, Q4H PRN, Marlys Dunn PA-C    [Held by provider] amLODIPine (NORVASC) tablet 10 mg, 10 mg, Oral, Daily, Lv Schaffer    aspirin (ECOTRIN LOW STRENGTH) EC tablet 81 mg, 81 mg, Oral, Daily, Marlys Dunn PA-C, 81 mg at 02/10/25 0814    atorvastatin (LIPITOR) tablet 80 mg, 80 mg, Oral, Daily With Dinner, LETA Lynn-KIRAN, 80 mg at 02/10/25 1711    cloNIDine (CATAPRES) tablet 0.1 mg, 0.1 mg, Oral, Daily, Marlys Dunn PA-C, 0.1 mg at 02/09/25 1748    ezetimibe (ZETIA) tablet 10 mg, 10 mg, Oral, Daily, Marlys Dunn PA-C, 10 mg at 02/10/25 0813    gabapentin (NEURONTIN) capsule 100 mg, 100 mg, Oral, TID, LETA Lynn-KIRAN, 100 mg at 02/10/25 2145    insulin aspart protamine-insulin aspart (NovoLOG 70/30) 100 units/mL subcutaneous injection 15 Units, 15 Units, Subcutaneous, BID AC, Marlys Dunn PA-C, 15 Units at 02/10/25 1709    insulin lispro (HumALOG/ADMELOG) 100 units/mL subcutaneous injection 1-6 Units, 1-6 Units, Subcutaneous, TID AC, 2 Units at 02/09/25 1155 **AND** Fingerstick Glucose (POCT), , , TID AC, Marlys Dunn PA-C    insulin lispro (HumALOG/ADMELOG) 100 units/mL subcutaneous injection 1-6 Units, 1-6 Units, Subcutaneous, HS, LETA Lynn-KIRAN, 5 Units at 02/10/25 2147    insulin lispro (HumALOG/ADMELOG) 100 units/mL subcutaneous injection 1-6 Units, 1-6 Units, Subcutaneous, 0200, Marlys Dunn PA-C, 3 Units at 02/11/25 0157    [Held by provider] losartan (COZAAR) tablet 25 mg, 25 mg, Oral, Daily, Emile Huggins MD    [Held by provider] metoprolol tartrate (LOPRESSOR) partial tablet 12.5 mg, 12.5 mg, Oral, Q12H VINAYAK, Emile Huggins MD    nitroglycerin (NITROSTAT) SL tablet 0.4 mg, 0.4 mg, Sublingual, Once PRN, Marlys  AVILA Dunn    ondansetron (ZOFRAN) injection 4 mg, 4 mg, Intravenous, Once PRN, Marlys Dunn PA-C    oxyCODONE-acetaminophen (PERCOCET) 5-325 mg per tablet 1 tablet, 1 tablet, Oral, Q4H PRN, Jerry Hale DO    pantoprazole (PROTONIX) EC tablet 40 mg, 40 mg, Oral, Daily, Marlys Dunn PA-C, 40 mg at 02/10/25 0814    senna-docusate sodium (SENOKOT S) 8.6-50 mg per tablet 1 tablet, 1 tablet, Oral, After Breakfast, Marlys Dunn PA-C, 1 tablet at 02/10/25 1713    [Held by provider] terazosin (HYTRIN) capsule 10 mg, 10 mg, Oral, HS, Lv Schaffer    ticagrelor (BRILINTA) tablet 90 mg, 90 mg, Oral, Q12H VINAYAK, Marlys Dunn PA-C, 90 mg at 02/10/25 2145    traMADol (ULTRAM) tablet 50 mg, 50 mg, Oral, Q12H PRN, Marlys Dunn PA-C     Labs & Results:        Results from last 7 days   Lab Units 02/10/25  0445 02/09/25  0946 02/08/25  0520   WBC Thousand/uL 7.95 10.00 8.16   HEMOGLOBIN g/dL 11.5* 10.7* 12.3   HEMATOCRIT % 35.8* 33.0* 37.8   PLATELETS Thousands/uL 152 150 155         Results from last 7 days   Lab Units 02/10/25  0445 02/09/25  0946 02/08/25  0520 02/07/25  1016   POTASSIUM mmol/L 4.2 4.3 5.2 5.2   CHLORIDE mmol/L 103 102 104 99   CO2 mmol/L 28 23 25 32   BUN mg/dL 22 27* 20 18   CREATININE mg/dL 0.89 0.99 0.91 0.94   CALCIUM mg/dL 9.2 8.7 9.2 10.3*   ALK PHOS U/L  --   --   --  69   ALT U/L  --   --   --  34   AST U/L  --   --   --  220*     Results from last 7 days   Lab Units 02/10/25  0445 02/09/25  0451 02/08/25  0520 02/07/25  1646 02/07/25  1025   INR   --   --   --   --  0.96   PTT seconds 58* 86* 63*   < > 25    < > = values in this interval not displayed.     Results from last 7 days   Lab Units 02/10/25  0445 02/08/25  0520   MAGNESIUM mg/dL 2.0 1.7*       Echo: personally reviewed - EF 55%, G1DD, AK basal to mid inferolateral and apical lateral walls with HK basal inferior wall.     EKG personally reviewed by Mehul Briggs MD.

## 2025-02-11 NOTE — DISCHARGE SUMMARY
Discharge Summary - Hospitalist   Name: Wagner Ochao 74 y.o. male I MRN: [id number]  Unit/Bed#: [facility] 405-01 I Date of Admission: 2/7/2025   Date of Service: 2/11/2025 I Hospital Day: 4     Assessment & Plan  Chest pain  75 y/o M with PMHx of HTN, HLD, & DM 2 who presented to ED after experiencing chest pain for about 3 days.   EKG with ST depressions in V1 and V2, possibly some moderately elevated ST segments in V3 through V6  Notably, troponin level >22,973 x 3.  Transferred from  ED to [facility] for  interventional evaluation  S/P cardiac cath on 2/10--s/p DARLENE to OM with small vessel diabetic CAD throughout coronaries  Cards input appreciated, felt presentation represents completed MI  Continue Brillinta (price checked on 2/11 and affordable), Aspirin  Statin, zetia  Added BB  DC Norvasc, ARB and clonidine given soft BPs and preference for BB therapy given CAD  Cardiac rehab referral provided for discharge, needs cardiac f/u  Fever  Tmax 101 °F noted on 02/08; recent fever of 100.3 °F noted on 02/10  No specific infectious complaints  COVID/FLU/RSV negative  LA WNL; no leukocytosis  Procal negative x 2  BCx w/o growth x 48 hours  CXR benign   UA benign   RP2 panel shows coronavirus, not COVID-19  Supportive care, afebrile over last 24 hours  Essential hypertension  BP soft but stable  Stop Norvasc and ARB  Reduced clonidine from 0.2 to 0.1 mg twice daily--would stop for discharge per cards recs  BB added  Type 2 diabetes mellitus with hyperglycemia, with long-term current use of insulin (HCC)  Lab Results   Component Value Date    HGBA1C 11.4 (A) 12/17/2024     Recent Labs     02/10/25  1648 02/11/25  0150 02/11/25  0749 02/11/25  1108   POCGLU 344* 246* 146* 257*     Uncontrolled per A1c above  Home regimen: NovoLog 70/30 50 U BID, Mounjaro 7.5 subQ weekly, and Jardiance 25 q day     CKD (chronic kidney disease) stage 2, GFR 60-89 ml/min  Lab Results   Component Value Date    EGFR 75 02/11/2025    EGFR 83  02/10/2025    EGFR 73 02/09/2025    CREATININE 0.97 02/11/2025    CREATININE 0.89 02/10/2025    CREATININE 0.99 02/09/2025     Renal function within baseline, creat stable post cath  Mixed hyperlipidemia  C/w atorvastatin 80 mg nightly and Zetia 10 mg daily  Gastroesophageal reflux disease  C/w Protonix 40 mg daily  Chronic midline low back pain with right-sided sciatica  C/w tramadol 50 mg daily  Peripheral polyneuropathy  C/w gabapentin 100 mg TID   Coronavirus infection  RP2 panel shows + coronavirus, not COVID-19  Supportive care  On RA     Medical Problems       Resolved Problems  Date Reviewed: 2/9/2025   None       Discharging Physician / Practitioner: Marlys Trevino PA-C  PCP: Ricardo Presley MD  Admission Date:   Admission Orders (From admission, onward)       Ordered        02/07/25 2010  Inpatient Admission  Once                          Discharge Date: 02/11/25    Consultations During Hospital Stay:  Cardiology    Procedures Performed:   Echo 2/8:  Left Ventricle: Left ventricular cavity size is normal. Wall thickness is normal. The left ventricular ejection fraction is 55%. Systolic function is normal. Diastolic function is mildly abnormal, consistent with grade I (abnormal) relaxation.    The following segments are akinetic: basal inferolateral, mid inferolateral and apical lateral.    The following segments are hypokinetic: basal inferior.    All other segments are normal.    Right Ventricle: Right ventricular cavity size is normal. Systolic function is normal.    Aortic Valve: There is aortic valve sclerosis.  Cardiac cath 2/10:     S/P PCI with DARLENE x 1 to the OM 80% lesion and likely culprit for his late-presenting NSTEMI  Notable small vessel, diabetic CAD throughout the coronary system, reflective of [id number] above 10% since 2018  Severe distal LAD disease in a sub 2-mm segment of the vessel that is not amenabel to PCI  Severe Ostial and Proximal RI disease in a sub 2-mm vessel that is  "not amenable to PCI  RCA with moderate CAD and distal RPDA with severe disease in a sub-2 mm segment that is not amenable to PCI  + RP2 panel-coronavirus OC 43  Blood cx: neg x 48 hours  COVID/Flu/RSV: negative  CXR: no acute disease    Significant Findings / Test Results:   See above    Incidental Findings:   See above    Test Results Pending at Discharge (will require follow up):   none     Outpatient Tests Requested:  Fu cards  F/u cardiac rehab  F/u PCP    Complications:  none     Reason for Admission: Chest pain     Hospital Course:   Wagner Ochoa is a 74 y.o. male patient who originally presented to the hospital on 2/7/2025 due to chest pain. Underwent cath with PCI on 2/10. See above A/P for specific details on hospital course above.     Please see above list of diagnoses and related plan for additional information.     Condition at Discharge: stable    Discharge Day Visit / Exam:   Subjective:  doing fine. Wants to go home. No chest pain.   Vitals: Blood Pressure: 116/97 (02/11/25 1107)  Pulse: 69 (02/11/25 1107)  Temperature: 98.5 °F (36.9 °C) (02/11/25 1107)  Temp Source: Oral (02/11/25 0732)  Respirations: 18 (02/11/25 1107)  Height: 5' 7\" (170.2 cm) (02/08/25 1118)  Weight - Scale: 75.3 kg (166 lb) (02/08/25 1118)  SpO2: 98 % (02/11/25 1107)  Physical Exam  Vitals and nursing note reviewed.   Constitutional:       General: He is not in acute distress.     Comments: On RA   Cardiovascular:      Rate and Rhythm: Normal rate and regular rhythm.   Pulmonary:      Effort: No respiratory distress.   Abdominal:      General: There is no distension.      Palpations: Abdomen is soft.   Musculoskeletal:      Right lower leg: No edema.      Left lower leg: No edema.   Neurological:      Mental Status: He is alert and oriented to person, place, and time.   Psychiatric:         Mood and Affect: Mood normal.          Discussion with Family: Patient declined call to .     Discharge " instructions/Information to patient and family:   See after visit summary for information provided to patient and family.      Provisions for Follow-Up Care:  See after visit summary for information related to follow-up care and any pertinent home health orders.      Mobility at time of Discharge:   Basic Mobility Inpatient Raw Score: 24  JH-HLM Goal: 8: Walk 250 feet or more  JH-HLM Achieved: 6: Walk 10 steps or more  HLM Goal achieved. Continue to encourage appropriate mobility.     Disposition:   Home    Planned Readmission: no    Discharge Medications:  See after visit summary for reconciled discharge medications provided to patient and/or family.      Administrative Statements   Discharge Statement:  I have spent a total time of 34 minutes in caring for this patient on the day of the visit/encounter.     **Please Note: This note may have been constructed using a voice recognition system**

## 2025-02-12 ENCOUNTER — TRANSITIONAL CARE MANAGEMENT (OUTPATIENT)
Dept: FAMILY MEDICINE CLINIC | Facility: CLINIC | Age: 75
End: 2025-02-12

## 2025-02-12 VITALS
SYSTOLIC BLOOD PRESSURE: 99 MMHG | TEMPERATURE: 98.5 F | BODY MASS INDEX: 26.06 KG/M2 | OXYGEN SATURATION: 39 % | HEART RATE: 36 BPM | HEIGHT: 67 IN | RESPIRATION RATE: 18 BRPM | DIASTOLIC BLOOD PRESSURE: 65 MMHG | WEIGHT: 166 LBS

## 2025-02-12 DIAGNOSIS — R06.02 SHORTNESS OF BREATH: ICD-10-CM

## 2025-02-12 NOTE — UTILIZATION REVIEW
NOTIFICATION OF ADMISSION DISCHARGE   This is a Notification of Discharge from [facility]. Please be advised that this patient has been discharge from our facility. Below you will find the admission and discharge date and time including the patient’s disposition.   UTILIZATION REVIEW CONTACT:  Raymond Zamarripa  Utilization   Network Utilization Review Department  Phone: [phone] x carefully listen to the prompts. All voicemails are confidential.  Email: [email or web address]     ADMISSION INFORMATION  PRESENTATION DATE: 2/7/2025  7:43 PM  OBERVATION ADMISSION DATE: N/A  INPATIENT ADMISSION DATE: 2/7/25  7:43 PM   DISCHARGE DATE: 2/11/2025  3:17 PM   DISPOSITION:Home/Self Care    Network Utilization Review Department  ATTENTION: Please call with any questions or concerns to [phone] and carefully listen to the prompts so that you are directed to the right person. All voicemails are confidential.   For Discharge needs, contact Care Management DC Support Team at [phone] opt. 2  Send all requests for admission clinical reviews, approved or denied determinations and any other requests to dedicated fax number below belonging to the campus where the patient is receiving treatment. List of dedicated fax numbers for the Facilities:  FACILITY NAME UR FAX NUMBER   ADMISSION DENIALS (Administrative/Medical Necessity) [phone]   DISCHARGE SUPPORT TEAM ([facility]) [phone]   PARENT CHILD HEALTH (Maternity/NICU/Pediatrics) [phone]   [facility] [phone]   [facility] [phone]   [facility] [phone]   [facility] [phone]   [facility] [phone]   [facility] [phone]   [facility] [phone]   [facility] [phone]   [facility]  [facility] [phone]   [facility] [phone]   [facility] [phone]   [facility] [phone]

## 2025-02-13 RX ORDER — ALBUTEROL SULFATE 90 UG/1
2 INHALANT RESPIRATORY (INHALATION) EVERY 6 HOURS PRN
Qty: 6.7 G | Refills: 4 | Status: SHIPPED | OUTPATIENT
Start: 2025-02-13

## 2025-02-14 LAB
BACTERIA BLD CULT: NORMAL
BACTERIA BLD CULT: NORMAL

## 2025-02-17 ENCOUNTER — OFFICE VISIT (OUTPATIENT)
Dept: CARDIOLOGY CLINIC | Facility: CLINIC | Age: 75
End: 2025-02-17
Payer: MEDICARE

## 2025-02-17 VITALS
HEIGHT: 67 IN | HEART RATE: 66 BPM | BODY MASS INDEX: 27.31 KG/M2 | SYSTOLIC BLOOD PRESSURE: 122 MMHG | WEIGHT: 174 LBS | DIASTOLIC BLOOD PRESSURE: 60 MMHG

## 2025-02-17 DIAGNOSIS — E78.5 HYPERLIPIDEMIA LDL GOAL <50: ICD-10-CM

## 2025-02-17 DIAGNOSIS — E78.2 MIXED HYPERLIPIDEMIA: ICD-10-CM

## 2025-02-17 DIAGNOSIS — I25.10 CORONARY ARTERY DISEASE INVOLVING NATIVE CORONARY ARTERY, UNSPECIFIED WHETHER ANGINA PRESENT, UNSPECIFIED WHETHER NATIVE OR TRANSPLANTED HEART: Primary | ICD-10-CM

## 2025-02-17 DIAGNOSIS — Z79.4 UNCONTROLLED DIABETES MELLITUS WITH HYPERGLYCEMIA, WITH LONG-TERM CURRENT USE OF INSULIN (HCC): ICD-10-CM

## 2025-02-17 DIAGNOSIS — I25.10 ATHEROSCLEROSIS OF NATIVE CORONARY ARTERY OF NATIVE HEART WITHOUT ANGINA PECTORIS: ICD-10-CM

## 2025-02-17 DIAGNOSIS — E11.65 UNCONTROLLED DIABETES MELLITUS WITH HYPERGLYCEMIA, WITH LONG-TERM CURRENT USE OF INSULIN (HCC): ICD-10-CM

## 2025-02-17 DIAGNOSIS — I21.9 MYOCARDIAL INFARCTION (HCC): ICD-10-CM

## 2025-02-17 DIAGNOSIS — I10 ESSENTIAL HYPERTENSION: ICD-10-CM

## 2025-02-17 PROCEDURE — 93000 ELECTROCARDIOGRAM COMPLETE: CPT | Performed by: INTERNAL MEDICINE

## 2025-02-17 PROCEDURE — G2211 COMPLEX E/M VISIT ADD ON: HCPCS | Performed by: INTERNAL MEDICINE

## 2025-02-17 PROCEDURE — 99215 OFFICE O/P EST HI 40 MIN: CPT | Performed by: INTERNAL MEDICINE

## 2025-02-17 RX ORDER — METOPROLOL TARTRATE 25 MG/1
25 TABLET, FILM COATED ORAL EVERY 12 HOURS SCHEDULED
Qty: 180 TABLET | Refills: 3 | Status: SHIPPED | OUTPATIENT
Start: 2025-02-17

## 2025-02-17 RX ORDER — LOSARTAN POTASSIUM 100 MG/1
100 TABLET ORAL DAILY
Qty: 90 TABLET | Refills: 3 | Status: SHIPPED | OUTPATIENT
Start: 2025-02-17

## 2025-02-17 NOTE — ASSESSMENT & PLAN NOTE
Blood pressure seems to be reasonably well-controlled.  He is on good medical regimen.  Will continue current medications.

## 2025-02-17 NOTE — ASSESSMENT & PLAN NOTE
Has severe dyslipidemia.  He is already on high-dose statin therapy and ezetimibe.  Requesting follow-up lipid panel including direct LDL measurement.  Will try to initiate PCSK9 therapy.  Orders:    Evolocumab 140 MG/ML SOAJ; Inject 1 mL (140 mg total) under the skin every 14 (fourteen) days    I have spent a total time of 50 minutes on 02/17/25 in caring for this patient including reviewing and summarizing prior history and work-up, discussing test results and management options, arrangement of care and documenting of the visit.

## 2025-02-17 NOTE — ASSESSMENT & PLAN NOTE
Lab Results   Component Value Date    HGBA1C 11.4 (A) 12/17/2024     Advising close follow-up with endocrinologist and primary physician to optimize diabetes control.  Orders:    losartan (COZAAR) 100 MG tablet; Take 1 tablet (100 mg total) by mouth daily

## 2025-02-17 NOTE — PATIENT INSTRUCTIONS
Assessment & Plan  Coronary artery disease involving native coronary artery, unspecified whether angina present, unspecified whether native or transplanted heart    Mr. Wagner Ochoa is status post recent acute MI and intervention of OM1.  He has severe diffuse triple-vessel CAD for which aggressive medical therapy has been advised at this time.  Currently heart rate and blood pressure are well-controlled.  He is on good medical regimen that includes on clinical examination there is no evidence of pulmonary or peripheral vascular congestion.atorvastatin 80 mg daily + ezetimibe 10 mg daily + aspirin 81 mg daily + ticagrelor 90 mg twice daily + empagliflozin 25 mg daily + metoprolol tartrate 25 mg twice daily + losartan 100 mg daily.    His diabetes is not very well-controlled.  He is on insulin therapy.  Lipid profile 7/1/2024: Total cholesterol 240 triglyceride 189 HDL 45 calculated  non-HDL cholesterol 195  Has normal renal function and electrolytes.  Has uncontrolled diabetes.    Advising to continue current medications.  Will greatly benefit with addition of PCSK9 inhibitor therapy.  I will order this medication  and now given that he has severe CAD hopefully his insurance will be able to cover.  Requesting fasting lipid profile with direct LDL measurement to be done with next blood work in 2 to 3 weeks.  He will initiate cardiac rehab.  I am advising him to discuss with primary care physician during visit tomorrow about optimization of his diabetes regimen and about his ongoing ear pain and difficulty swallowing.  Reemphasized importance of medications and cardiac rehab and follow-up.  Recommend discussion with primary care physician and endocrinologist regarding neuropathic pain related to his diabetes.  I will arrange a 6-week follow-up with advanced practitioner in cardiology office.  Should delay all elective surgical procedures including colonoscopy for 1 year time unless procedure is urgent or  emergent.  Reemphasize importance of follow-up with cardiac rehab  Orders:    POCT ECG    losartan (COZAAR) 100 MG tablet; Take 1 tablet (100 mg total) by mouth daily    Evolocumab 140 MG/ML SOAJ; Inject 1 mL (140 mg total) under the skin every 14 (fourteen) days    Essential hypertension  Blood pressure seems to be reasonably well-controlled.  He is on good medical regimen.  Will continue current medications.       Uncontrolled diabetes mellitus with hyperglycemia, with long-term current use of insulin (HCC)    Lab Results   Component Value Date    HGBA1C 11.4 (A) 12/17/2024     Advising close follow-up with endocrinologist and primary physician to optimize diabetes control.  Orders:    losartan (COZAAR) 100 MG tablet; Take 1 tablet (100 mg total) by mouth daily    Myocardial infarction (HCC)    Orders:    metoprolol tartrate (LOPRESSOR) 25 mg tablet; Take 1 tablet (25 mg total) by mouth every 12 (twelve) hours    ticagrelor (BRILINTA) 90 MG; Take 1 tablet (90 mg total) by mouth every 12 (twelve) hours    Hyperlipidemia LDL goal <50  Has severe dyslipidemia.  He is already on high-dose statin therapy and ezetimibe.  Requesting follow-up lipid panel including direct LDL measurement.  Will try to initiate PCSK9 therapy.  Orders:    Evolocumab 140 MG/ML SOAJ; Inject 1 mL (140 mg total) under the skin every 14 (fourteen) days    I have spent a total time of 50 minutes on 02/17/25 in caring for this patient including reviewing and summarizing prior history and work-up, discussing test results and management options, arrangement of care and documenting of the visit.

## 2025-02-17 NOTE — PROGRESS NOTES
Name: Wagner Ochoa      : 1950      MRN: [id number]  Encounter Provider: Suhas Edwards MD  Encounter Date: 2025   Encounter department: [facility] CARDIOLOGY [place]    DIAGNOSES:  1.  Severe triple-vessel coronary artery disease, late presented STEMI, status post PCI with drug-eluting stent to OM with small vessel diabetic disease throughout coronaries  2. Dyslipidemia  3. Hypertension  4. Stage II chronic kidney disease  5. GERD  6. Diabetes mellitus    TTE 2025:- EF 55%, G1DD, AK basal to mid inferolateral and apical lateral walls with HK basal inferior wall -- normal RV size and function -- normal left and right atrial size -- trileaflet aortic valve without stenosis, mild sclerosis -- trace to mitral valve regurgitation -- no tricuspid or pulmonic valve regurgitation -- no pericardial effusion.     CARDIAC CATHETERIZATION 2/10/2025:  Left main moderate size with moderate diffuse disease with mid left main 25% stenosis.  LAD: Small vessel with severe diffuse disease.  3  sequential lesions in distal LAD with 45%, 50% and 70% stenosis.  Ramus intermedius: Small vessel severe diffuse disease.  2 consecutive lesions with 90% and 99% stenosis.  LCx: Moderate size with severe diffuse disease.  OM1 80% stenosis.  Successfully treated with PCI.  RCA: Small moderate diffuse disease.  25% proximal stenosis.  RPDA small vessel with severe diffuse disease with 75% lesion.    Assessment & Plan  Coronary artery disease involving native coronary artery, unspecified whether angina present, unspecified whether native or transplanted heart    Mr. Wagner Ochoa is status post recent acute MI and intervention of OM1.  He has severe diffuse triple-vessel CAD for which aggressive medical therapy has been advised at this time.  Currently heart rate and blood pressure are well-controlled.  He is on good medical regimen that includes on clinical examination there is no evidence of pulmonary or peripheral  vascular congestion.atorvastatin 80 mg daily + ezetimibe 10 mg daily + aspirin 81 mg daily + ticagrelor 90 mg twice daily + empagliflozin 25 mg daily + metoprolol tartrate 25 mg twice daily + losartan 100 mg daily.    His diabetes is not very well-controlled.  He is on insulin therapy.  Lipid profile 7/1/2024: Total cholesterol 240 triglyceride 189 HDL 45 calculated  non-HDL cholesterol 195  Has normal renal function and electrolytes.  Has uncontrolled diabetes.    Advising to continue current medications.  Will greatly benefit with addition of PCSK9 inhibitor therapy.  I will order this medication  and now given that he has severe CAD hopefully his insurance will be able to cover.  Requesting fasting lipid profile with direct LDL measurement to be done with next blood work in 2 to 3 weeks.  He will initiate cardiac rehab.  I am advising him to discuss with primary care physician during visit tomorrow about optimization of his diabetes regimen and about his ongoing ear pain and difficulty swallowing.  Reemphasized importance of medications and cardiac rehab and follow-up.  Recommend discussion with primary care physician and endocrinologist regarding neuropathic pain related to his diabetes.  I will arrange a 6-week follow-up with advanced practitioner in cardiology office.  Should delay all elective surgical procedures including colonoscopy for 1 year time unless procedure is urgent or emergent.  Reemphasize importance of follow-up with cardiac rehab  Orders:    POCT ECG    losartan (COZAAR) 100 MG tablet; Take 1 tablet (100 mg total) by mouth daily    Evolocumab 140 MG/ML SOAJ; Inject 1 mL (140 mg total) under the skin every 14 (fourteen) days    Essential hypertension  Blood pressure seems to be reasonably well-controlled.  He is on good medical regimen.  Will continue current medications.       Uncontrolled diabetes mellitus with hyperglycemia, with long-term current use of insulin (HCC)    Lab Results    Component Value Date    HGBA1C 11.4 (A) 12/17/2024     Advising close follow-up with endocrinologist and primary physician to optimize diabetes control.  Orders:    losartan (COZAAR) 100 MG tablet; Take 1 tablet (100 mg total) by mouth daily    Myocardial infarction (HCC)    Orders:    metoprolol tartrate (LOPRESSOR) 25 mg tablet; Take 1 tablet (25 mg total) by mouth every 12 (twelve) hours    ticagrelor (BRILINTA) 90 MG; Take 1 tablet (90 mg total) by mouth every 12 (twelve) hours    Hyperlipidemia LDL goal <50  Has severe dyslipidemia.  He is already on high-dose statin therapy and ezetimibe.  Requesting follow-up lipid panel including direct LDL measurement.  Will try to initiate PCSK9 therapy.  Orders:    Evolocumab 140 MG/ML SOAJ; Inject 1 mL (140 mg total) under the skin every 14 (fourteen) days    I have spent a total time of 50 minutes on 02/17/25 in caring for this patient including reviewing and summarizing prior history and work-up, discussing test results and management options, arrangement of care and documenting of the visit.      History of Present Illness   HPI  Wagner Ochoa is a 74 y.o. male who presents for follow-up of his recently diagnosed severe CAD and PCI.  He presented to [place] emergency room with 3-day history of chest pain and was noted to have ST elevations.  He was transferred to [facility].  He was suspected to have late presentation MI.  He also had febrile illness and tested positive for non-COVID coronavirus.  He underwent cardiac catheterization and results were noted to be above.  His blood pressure also was markedly elevated.  He was discharged to home and advised to establish follow-up with cardiology office and begin cardiac rehab.    Today he mentions that since coming home he has had no recurrence of chest pain and no shortness of breath.  He does report that in the last couple of days he is having pain in the right ear region especially when he swallows  "something.  Denies any fevers or chills.  Denies orthopnea or PND or worsening pedal edema.  Also reports experiencing burning pain  in his hands and feet which has been going on for some time.    Ambulates with a cane.    He reports quitting smoking in 2001.  He denies any alcohol history or history of illicit drug use.    There is a strong family history of coronary artery disease with brother having undergone bypass surgery in his 50s and dying prematurely, his sister dying in her 70s due to CAD and his mother having history of CAD.    His current cardiac medications include atorvastatin 80 mg daily + ezetimibe 10 mg daily + aspirin 81 mg daily + ticagrelor 90 mg twice daily + empagliflozin 25 mg daily + metoprolol tartrate 25 mg twice daily + losartan 100 mg daily.      He was also prescribed Repatha in the past but  his medical insurance did not cover this medication.    He was also prescribed evolocumab but he has not been taking this.    Blood work from 2/11/2025 is reviewed.  Sodium 1 7 potassium 3.9 chloride 106 bicarb 25 BUN 25 creatinine 0.97 GFR 75.  Hemoglobin 11.5 hematocrit 34.8 platelet count 152  -3 troponins were greater than 73862.  BNP was 258 on 2/7/2025.  Hemoglobin A1c was 11.4 in December 2024.  TSH was 1.477 on 2/10/2025.    History obtained from: patient    Review of Systems   Constitutional:  Positive for fatigue.   HENT:  Positive for ear pain and trouble swallowing.    Cardiovascular:  Negative for chest pain, palpitations and leg swelling.   Gastrointestinal: Negative.    Musculoskeletal:  Positive for arthralgias and back pain.   Skin: Negative.    Neurological:  Negative for dizziness, syncope, weakness and light-headedness.   Psychiatric/Behavioral: Negative.            Objective   /60 (BP Location: Right arm, Patient Position: Sitting, Cuff Size: Standard)   Pulse 66   Ht 5' 7\" (1.702 m)   Wt 78.9 kg (174 lb)   BMI 27.25 kg/m²      Physical Exam  Vitals reviewed. "   Constitutional:       Appearance: He is normal weight.   HENT:      Head: Normocephalic.      Mouth/Throat:      Mouth: Mucous membranes are moist.   Neck:      Vascular: No carotid bruit.   Cardiovascular:      Rate and Rhythm: Normal rate and regular rhythm.      Heart sounds: No murmur heard.  Pulmonary:      Effort: Pulmonary effort is normal.      Breath sounds: Normal breath sounds.   Abdominal:      General: Abdomen is flat.      Palpations: Abdomen is soft.   Musculoskeletal:      Cervical back: Neck supple.      Right lower leg: No edema.      Left lower leg: No edema.   Skin:     General: Skin is warm and dry.   Neurological:      Mental Status: He is alert and oriented to person, place, and time. Mental status is at baseline.   Psychiatric:         Mood and Affect: Mood normal.         Behavior: Behavior normal.

## 2025-02-18 ENCOUNTER — TELEPHONE (OUTPATIENT)
Dept: CARDIOLOGY CLINIC | Facility: CLINIC | Age: 75
End: 2025-02-18

## 2025-02-18 ENCOUNTER — OFFICE VISIT (OUTPATIENT)
Dept: FAMILY MEDICINE CLINIC | Facility: CLINIC | Age: 75
End: 2025-02-18
Payer: MEDICARE

## 2025-02-18 VITALS
HEART RATE: 62 BPM | WEIGHT: 170.6 LBS | BODY MASS INDEX: 26.78 KG/M2 | DIASTOLIC BLOOD PRESSURE: 70 MMHG | HEIGHT: 67 IN | OXYGEN SATURATION: 95 % | SYSTOLIC BLOOD PRESSURE: 118 MMHG | TEMPERATURE: 98.7 F

## 2025-02-18 DIAGNOSIS — Z79.4 TYPE 2 DIABETES MELLITUS WITH HYPERGLYCEMIA, WITH LONG-TERM CURRENT USE OF INSULIN (HCC): ICD-10-CM

## 2025-02-18 DIAGNOSIS — I25.83 CORONARY ARTERY DISEASE DUE TO LIPID RICH PLAQUE: Primary | ICD-10-CM

## 2025-02-18 DIAGNOSIS — I25.10 CORONARY ARTERY DISEASE DUE TO LIPID RICH PLAQUE: Primary | ICD-10-CM

## 2025-02-18 DIAGNOSIS — M79.672 PAIN IN BOTH FEET: ICD-10-CM

## 2025-02-18 DIAGNOSIS — E78.5 HYPERLIPIDEMIA LDL GOAL <50: ICD-10-CM

## 2025-02-18 DIAGNOSIS — E11.65 TYPE 2 DIABETES MELLITUS WITH HYPERGLYCEMIA, WITH LONG-TERM CURRENT USE OF INSULIN (HCC): ICD-10-CM

## 2025-02-18 DIAGNOSIS — I10 ESSENTIAL HYPERTENSION: ICD-10-CM

## 2025-02-18 DIAGNOSIS — M79.671 PAIN IN BOTH FEET: ICD-10-CM

## 2025-02-18 PROCEDURE — 99495 TRANSJ CARE MGMT MOD F2F 14D: CPT | Performed by: INTERNAL MEDICINE

## 2025-02-18 RX ORDER — TIRZEPATIDE 12.5 MG/.5ML
12.5 INJECTION, SOLUTION SUBCUTANEOUS WEEKLY
Qty: 2 ML | Refills: 0 | Status: SHIPPED | OUTPATIENT
Start: 2025-02-18

## 2025-02-18 NOTE — PATIENT INSTRUCTIONS
Please increase gabapentin up to 600 mg at night, continue to take 300 mg in the morning and with your lunch.  In 7 days again increase up to 600 mg with breakfast, 300 mg with lunch and 600 mg at night.  In 7 days again increase up to 600 mg 3 times a day.    Please bring your medications from home for the next appointment so I will be able to review it.

## 2025-02-18 NOTE — ASSESSMENT & PLAN NOTE
Likely due to peripheral neuropathy.  Will continue to taper up his gabapentin, side effects discussed.  Follow-up in 1 month.

## 2025-02-18 NOTE — ASSESSMENT & PLAN NOTE
Status post FELICE with DARLENE, no active complaints right now.  Continue follow-up with cardiology.  He is currently on DAPT, BB, atorvastatin, Zetia.

## 2025-02-18 NOTE — ASSESSMENT & PLAN NOTE
It is currently uncontrolled, we continue to taper up his Mounjaro, continue with current dose of insulin, Jardiance, metformin.  Lab Results   Component Value Date    HGBA1C 11.4 (A) 12/17/2024       Orders:    Tirzepatide (Mounjaro) 12.5 MG/0.5ML SOAJ; Inject 12.5 mg under the skin once a week

## 2025-02-18 NOTE — ASSESSMENT & PLAN NOTE
His LDL is still significantly above range, Repatha was ordered by cardiology.  Continue Zetia and atorvastatin.

## 2025-02-18 NOTE — TELEPHONE ENCOUNTER
PA for repatha 140 mg/ml SUBMITTED to [employer]  via    [x]CMM-KEY: [id number]  []Surescripts-Case ID #   []Availity-Auth ID # NDC #   []Faxed to plan   []Other website   []Phone call Case ID #     []PA sent as URGENT    All office notes, labs and other pertaining documents and studies sent. Clinical questions answered. Awaiting determination from insurance company.     Turnaround time for your insurance to make a decision on your Prior Authorization can take 7-21 business days.

## 2025-02-18 NOTE — TELEPHONE ENCOUNTER
Fax received from [employer] [facility] for Repatha SureClick 140mg/mL auto-injectors.     KEY: [id number]    Scanned into chart.

## 2025-02-18 NOTE — PROGRESS NOTES
Transition of Care Visit  Name: Wagner Ochoa      : 1950      MRN: [id number]  Encounter Provider: Ricardo Presley MD  Encounter Date: 2025   Encounter department: [facility] PRIMARY CARE    Assessment & Plan  Type 2 diabetes mellitus with hyperglycemia, with long-term current use of insulin (HCC)  It is currently uncontrolled, we continue to taper up his Mounjaro, continue with current dose of insulin, Jardiance, metformin.  Lab Results   Component Value Date    HGBA1C 11.4 (A) 2024       Orders:    Tirzepatide (Mounjaro) 12.5 MG/0.5ML SOAJ; Inject 12.5 mg under the skin once a week    Coronary artery disease due to lipid rich plaque  Status post FELICE with DARLENE, no active complaints right now.  Continue follow-up with cardiology.  He is currently on DAPT, BB, atorvastatin, Zetia.         Hyperlipidemia LDL goal <50  His LDL is still significantly above range, Repatha was ordered by cardiology.  Continue Zetia and atorvastatin.       Pain in both feet  Likely due to peripheral neuropathy.  Will continue to taper up his gabapentin, side effects discussed.  Follow-up in 1 month.         Essential hypertension  His blood pressures are very acceptable on current dose of losartan and metoprolol.              History of Present Illness     Transitional Care Management Review:   Wagner Ochoa is a 74 y.o. male here for TCM follow up.     During the TCM phone call patient stated:  TCM Call       Date and time call was made  2025  1:10 PM    Hospital care reviewed  Records reviewed    Patient was hospitialized at  [facility]    Date of Admission  25    Date of discharge  25    Diagnosis  Chest pain    Disposition  Home    Were the patients medications reviewed and updated  Yes    Current Symptoms  None          TCM Call       Post hospital issues  None    Scheduled for follow up?  Yes    Did you obtain your prescribed medications  Yes    Do you  "need help managing your prescriptions or medications  No    Is transportation to your appointment needed  No    I have advised the patient to call PCP with any new or worsening symptoms  Dirk ALLEN          Patient came today for TCM visit after recent admission to the hospital due to chest pain, cardiac catheterization was done, he is status post FELICE with DARLENE.  He already followed up with cardiologist, he is currently on DAPT.  He denies any active complaints right now except of ongoing numbness and tingling sensation in his lower extremities likely due to uncontrolled diabetes.      Review of Systems   Constitutional:  Negative for chills and fever.   Respiratory:  Negative for cough, shortness of breath and wheezing.    Cardiovascular:  Negative for chest pain, palpitations and leg swelling.   Gastrointestinal:  Negative for abdominal distention, abdominal pain and anal bleeding.   Musculoskeletal:  Positive for gait problem and myalgias.   Skin:  Negative for color change.     Objective   /70 (BP Location: Left arm, Patient Position: Sitting, Cuff Size: Large)   Pulse 62   Temp 98.7 °F (37.1 °C)   Ht 5' 7\" (1.702 m)   Wt 77.4 kg (170 lb 9.6 oz)   SpO2 95%   BMI 26.72 kg/m²     Physical Exam  Constitutional:       General: He is not in acute distress.     Appearance: He is not ill-appearing or toxic-appearing.   Cardiovascular:      Heart sounds: No murmur heard.     Comments: Dorsalis pedis 1+ bilaterally.  Pulmonary:      Effort: No respiratory distress.      Breath sounds: No wheezing or rales.       Medications have been reviewed by provider in current encounter      "

## 2025-02-19 DIAGNOSIS — Z95.5 S/P CORONARY ARTERY STENT PLACEMENT: Primary | ICD-10-CM

## 2025-02-19 NOTE — TELEPHONE ENCOUNTER
Call from [employer], stating they received the 'Appeal' for the Repatha and have questions. Please call Marta at [phone].

## 2025-02-19 NOTE — TELEPHONE ENCOUNTER
Left message for Marta to return call. Unsure why it is being processed as an appeal . The pod has not submitted a prior auth on this patient prior to my submission on 2/18

## 2025-02-20 NOTE — TELEPHONE ENCOUNTER
PA for Repatha 140 mg/ml  DENIED    Reason:(Screenshot if applicable)  Per call to plan, originally denied under his family doctor, due to needing to try and fail praluent. Please change to praluent if appropriate and route back to the pod for prior auth    Message sent to office clinical pool Yes    Denial letter scanned into [employer] No      Appeal started No (Provider will need to decide if appeal is warranted and send clinical documentation to Prior Authorization Team for initiation.)    **Please follow up with your patient regarding denial and next steps**

## 2025-02-21 ENCOUNTER — OFFICE VISIT (OUTPATIENT)
Dept: VASCULAR SURGERY | Facility: CLINIC | Age: 75
End: 2025-02-21
Payer: MEDICARE

## 2025-02-21 VITALS
HEIGHT: 67 IN | HEART RATE: 72 BPM | BODY MASS INDEX: 26.84 KG/M2 | DIASTOLIC BLOOD PRESSURE: 80 MMHG | WEIGHT: 171 LBS | SYSTOLIC BLOOD PRESSURE: 124 MMHG

## 2025-02-21 DIAGNOSIS — E11.65 TYPE 2 DIABETES MELLITUS WITH HYPERGLYCEMIA, WITH LONG-TERM CURRENT USE OF INSULIN (HCC): ICD-10-CM

## 2025-02-21 DIAGNOSIS — G62.9 PERIPHERAL POLYNEUROPATHY: ICD-10-CM

## 2025-02-21 DIAGNOSIS — I73.9 PVD (PERIPHERAL VASCULAR DISEASE) (HCC): Primary | ICD-10-CM

## 2025-02-21 DIAGNOSIS — M79.671 PAIN IN BOTH FEET: ICD-10-CM

## 2025-02-21 DIAGNOSIS — M79.672 PAIN IN BOTH FEET: ICD-10-CM

## 2025-02-21 DIAGNOSIS — I73.9 CLAUDICATION (HCC): ICD-10-CM

## 2025-02-21 DIAGNOSIS — Z79.4 TYPE 2 DIABETES MELLITUS WITH HYPERGLYCEMIA, WITH LONG-TERM CURRENT USE OF INSULIN (HCC): ICD-10-CM

## 2025-02-21 PROCEDURE — 99204 OFFICE O/P NEW MOD 45 MIN: CPT | Performed by: SURGERY

## 2025-02-21 NOTE — PROGRESS NOTES
Name: Wagner Ochoa      : 1950      MRN: [id number]  Encounter Provider: Annie Ferreira DO  Encounter Date: 2025   Encounter department: [facility] [place]  :  Assessment & Plan  PVD (peripheral vascular disease) ([facility])    Orders:    Ambulatory Referral to Vascular Surgery    VAS ARTERIAL DUPLEX- LOWER LIMB BILATERAL; Future    Claudication ([facility])    Orders:    Ambulatory Referral to Vascular Surgery    Peripheral polyneuropathy         Type 2 diabetes mellitus with hyperglycemia, with long-term current use of insulin ([facility])    Lab Results   Component Value Date    HGBA1C 11.4 (A) 2024            Pain in both feet  Patient is a 74-year-old gentleman who is referred to the office for bilateral feet burning pain and swelling.  Did have a lower extremity arterial duplex which was remarkable for a right TY of 0.51 with an SFA occlusion.  His left TY is 1.03.  Patient reports that symptoms are equal/symmetrical on both feet.  He does not report any typical claudication when specifically asked.  Is a former smoker.  At this time his symptoms appear consistent with diabetic peripheral neuropathy and not claudication symptoms.  Should he develop lifestyle limiting claudication, rest pain, and/or tissue loss he should notify the office.  He should continue on his aspirin.  He has been encouraged to get his diabetes under control as his hemoglobin A1c is greater than 11.  Recommend daily ambulation.  Recommend meticulous foot hygiene.  Recommend 6-month lower extremity arterial duplex and sooner if indicated.             History of Present Illness   HPI  Wagner Ochoa is a 74 y.o. male who presents to the office for bilateral foot pain.  The pain is localized to the plantar aspect of his feet.  The symptoms have been present for approximately 3 months.  Patient denies any calf claudication.  Reports the pain is constant.  Patient does not report any provoking/alleviating  "activity/positioning.  He reports that his toes feel \"tight\" when flexing and extending them.      Patient presents today as a new patient for PVD. Patient had YOHANA done 1/23/2025. Patient c/o pain and coldness in his legs, right > left, worse with walking. Patient also c/o numbness in right foot and toes. Patient states he has to stop and rest when he goes grocery shopping. He states every day, his symptoms get worse. Patient also c/o swelling b/l feet. Patient is taking ASA 81mg, Atorvastatin, Zetia, and Brilinta. Patient is a former smoker.     History obtained from: patient    Review of Systems   Constitutional: Negative.    HENT: Negative.     Eyes: Negative.    Respiratory: Negative.     Cardiovascular:  Positive for leg swelling.   Gastrointestinal: Negative.    Endocrine: Negative.    Genitourinary: Negative.    Musculoskeletal: Negative.    Skin: Negative.    Allergic/Immunologic: Negative.    Neurological: Negative.    Hematological:  Bruises/bleeds easily.   Psychiatric/Behavioral: Negative.       Past Medical History   Past Medical History:   Diagnosis Date    Diabetes mellitus (HCC)      Past Surgical History:   Procedure Laterality Date    APPENDECTOMY Right 1970    PARAMEDIAN INCISION    CARDIAC CATHETERIZATION Left 2/10/2025    Procedure: Cardiac Left Heart Cath;  Surgeon: Jada Cueto DO;  Location: [facility];  Service: Cardiology    CARDIAC CATHETERIZATION N/A 2/10/2025    Procedure: Cardiac Coronary Angiogram;  Surgeon: Jada Cueto DO;  Location: [facility];  Service: Cardiology    CARDIAC CATHETERIZATION N/A 2/10/2025    Procedure: Cardiac PCI;  Surgeon: Jada Cueto DO;  Location: [facility];  Service: Cardiology     No family history on file.   reports that he has quit smoking. His smoking use included cigarettes. He has quit using smokeless tobacco. He reports that he does not drink alcohol and does not use drugs.  Current Outpatient Medications "   Medication Instructions    albuterol (PROVENTIL HFA,VENTOLIN HFA) 90 mcg/act inhaler 2 puffs, Inhalation, Every 6 hours PRN    ammonium lactate (LAC-HYDRIN) 12 % cream APPLY TOPICALLY TO AFFECTED AREA ONCE DAILY AS NEEDED FOR DRY SKIN    aspirin 81 mg, Oral, Daily    atorvastatin (LIPITOR) 80 mg, Oral, Daily with dinner    Blood Glucose Monitoring Suppl (OneTouch Verio Reflect) w/Device KIT Check blood sugars three times daily. Please substitute with appropriate alternative as covered by patient's insurance. Dx: E11.65    carbamide peroxide (DEBROX) 6.5 % otic solution 5 drops, Otic, 2 times daily    Continuous Blood Gluc Sensor (FreeStyle Lucas 14 Day Sensor) MISC No dose, route, or frequency recorded.    Continuous Blood Gluc Sensor (FreeStyle Lucas 14 Day Sensor) MISC 1 each, Every 14 days    EASY COMFORT PEN NEEDLES 32G X 4 MM MISC No dose, route, or frequency recorded.    Empagliflozin 25 mg, Oral, Daily    ergocalciferol (VITAMIN D2) 50,000 units Take 1 capsule weekly x8 weeks, then 2000 units p.o. Daily.    Evolocumab 140 mg, Subcutaneous, Every 14 days    ezetimibe (ZETIA) 10 mg, Oral, Daily    gabapentin (NEURONTIN) 300 mg capsule TAKE ONE CAPSULE BY MOUTH THREE TIMES A DAY .START WITH ONCE DAILY .INCREASE TO TWICE A DAY FOR 7 DAY .THEN CONTINUE THREE TIMES A DAY    glucose blood (FREESTYLE LITE) test strip 1 each, Other, 3 times daily, Use as instructed    glucose blood (OneTouch Verio) test strip Check blood sugars three times daily. Please substitute with appropriate alternative as covered by patient's insurance. Dx: E11.65    insulin aspart protamine-insulin aspart (NovoLOG Mix 70/30 FlexPen) 100 Units/mL injection pen 20 Units, Subcutaneous, 2 times daily with meals    Insulin Pen Needle 32G X 4 MM MISC Use with flexpens BID    losartan (COZAAR) 100 mg, Oral, Daily    metFORMIN (GLUCOPHAGE-XR) 750 mg, Oral, Daily with breakfast    metoprolol tartrate (LOPRESSOR) 25 mg, Oral, Every 12 hours scheduled     Mounjaro 12.5 mg, Subcutaneous, Weekly    Multiple Vitamins-Minerals (Vitramyn) TABS 1 tablet, Oral, Daily    Narcan 4 MG/0.1ML nasal spray USE ONE SPRAY IN ONE NOSTRIL FOR ONE DOSE. IF THE DESIRED RESPONSE IS NOT OBTAINED AFTER 2-3 MINUTES, ADMINISTER ADDITIONAL DOSE IN OPPOSITE NOSTRIL USING NEW SPRAY    nitroglycerin (NITROSTAT) 0.4 mg, Sublingual, Once as needed    OneTouch Delica Lancets 33G MISC Check blood sugars three times daily. Please substitute with appropriate alternative as covered by patient's insurance. Dx: E11.65    pantoprazole (PROTONIX) 40 mg, Oral, Daily    SURE COMFORT LANCETS 30G MISC use as directed    terazosin (HYTRIN) 10 mg    ticagrelor (BRILINTA) 90 mg, Oral, Every 12 hours scheduled    traMADol (ULTRAM) 50 mg, Oral, Every 12 hours PRN    triamcinolone (KENALOG) 0.1 % cream APPLY TOPICALLY TO AFFECTED AREA TWICE A DAY AS NEEDED FOR RASH    UltiCare Alcohol Swabs 70 % PADS USE TO CLEAN THE AREAS BEFORE TESTING BLOOD SUGAR OR/AND INJECTING INSULIN ONCE DAILY     Allergies   Allergen Reactions    Iodinated Contrast Media Other (See Comments)     Facial swelling          Current Outpatient Medications on File Prior to Visit   Medication Sig Dispense Refill    albuterol (PROVENTIL HFA,VENTOLIN HFA) 90 mcg/act inhaler INHALE 2 PUFFS BY MOUTH EVERY 6 HOURS AS NEEDED FOR WHEEZING 6.7 g 4    ammonium lactate (LAC-HYDRIN) 12 % cream APPLY TOPICALLY TO AFFECTED AREA ONCE DAILY AS NEEDED FOR DRY SKIN 385 g 0    aspirin 81 mg chewable tablet Chew 1 tablet (81 mg total) daily 30 tablet 0    atorvastatin (LIPITOR) 80 mg tablet TAKE ONE TABLET BY MOUTH ONCE DAILY WITH DINNER 90 tablet 1    Blood Glucose Monitoring Suppl (OneTouch Verio Reflect) w/Device KIT Check blood sugars three times daily. Please substitute with appropriate alternative as covered by patient's insurance. Dx: E11.65 1 kit 0    carbamide peroxide (DEBROX) 6.5 % otic solution Administer 5 drops into ears 2 (two) times a day for 4  days 15 mL 0    Continuous Blood Gluc Sensor (FreeStyle Lucas 14 Day Sensor) MISC       Continuous Blood Gluc Sensor (FreeStyle Lucas 14 Day Sensor) MISC Use 1 each every 14 (fourteen) days      EASY COMFORT PEN NEEDLES 32G X 4 MM MISC       Empagliflozin 25 MG TABS Take 1 tablet (25 mg total) by mouth in the morning 90 tablet 2    ergocalciferol (VITAMIN D2) 50,000 units Take 1 capsule weekly x8 weeks, then 2000 units p.o. Daily. 8 capsule 1    Evolocumab 140 MG/ML SOAJ Inject 1 mL (140 mg total) under the skin every 14 (fourteen) days 2 mL 5    ezetimibe (ZETIA) 10 mg tablet TAKE ONE TABLET BY MOUTH ONCE DAILY 90 tablet 1    gabapentin (NEURONTIN) 300 mg capsule TAKE ONE CAPSULE BY MOUTH THREE TIMES A DAY .START WITH ONCE DAILY .INCREASE TO TWICE A DAY FOR 7 DAY .THEN CONTINUE THREE TIMES A  capsule 1    glucose blood (FREESTYLE LITE) test strip Use 1 each 3 (three) times a day Use as instructed 300 strip 1    glucose blood (OneTouch Verio) test strip Check blood sugars three times daily. Please substitute with appropriate alternative as covered by patient's insurance. Dx: E11.65 300 each 3    insulin aspart protamine-insulin aspart (NovoLOG Mix 70/30 FlexPen) 100 Units/mL injection pen Inject 20 Units under the skin 2 (two) times a day with meals      Insulin Pen Needle 32G X 4 MM MISC Use with flexpens BID      losartan (COZAAR) 100 MG tablet Take 1 tablet (100 mg total) by mouth daily 90 tablet 3    metFORMIN (GLUCOPHAGE-XR) 750 mg 24 hr tablet TAKE ONE TABLET BY MOUTH ONCE DAILY WITH BREAKFAST 90 tablet 1    metoprolol tartrate (LOPRESSOR) 25 mg tablet Take 1 tablet (25 mg total) by mouth every 12 (twelve) hours 180 tablet 3    Multiple Vitamins-Minerals (Vitramyn) TABS TAKE ONE TABLET BY MOUTH ONCE DAILY 90 tablet 3    Narcan 4 MG/0.1ML nasal spray USE ONE SPRAY IN ONE NOSTRIL FOR ONE DOSE. IF THE DESIRED RESPONSE IS NOT OBTAINED AFTER 2-3 MINUTES, ADMINISTER ADDITIONAL DOSE IN OPPOSITE NOSTRIL USING  "NEW SPRAY 2 each 0    nitroglycerin (NITROSTAT) 0.4 mg SL tablet Place 1 tablet (0.4 mg total) under the tongue once as needed for chest pain for up to 1 dose 20 tablet 0    OneTouch Delica Lancets 33G MISC Check blood sugars three times daily. Please substitute with appropriate alternative as covered by patient's insurance. Dx: E11.65 300 each 3    pantoprazole (PROTONIX) 40 mg tablet Take 1 tablet (40 mg total) by mouth daily 90 tablet 2    SURE COMFORT LANCETS 30G MISC use as directed      ticagrelor (BRILINTA) 90 MG Take 1 tablet (90 mg total) by mouth every 12 (twelve) hours 180 tablet 3    Tirzepatide (Mounjaro) 12.5 MG/0.5ML SOAJ Inject 12.5 mg under the skin once a week 2 mL 0    traMADol (ULTRAM) 50 mg tablet Take 1 tablet (50 mg total) by mouth every 12 (twelve) hours as needed for moderate pain 60 tablet 0    triamcinolone (KENALOG) 0.1 % cream APPLY TOPICALLY TO AFFECTED AREA TWICE A DAY AS NEEDED FOR RASH 30 g 0    UltiCare Alcohol Swabs 70 % PADS USE TO CLEAN THE AREAS BEFORE TESTING BLOOD SUGAR OR/AND INJECTING INSULIN ONCE DAILY 100 each 6    terazosin (HYTRIN) 10 MG capsule Take 10 mg by mouth       No current facility-administered medications on file prior to visit.      Social History     Tobacco Use    Smoking status: Former     Types: Cigarettes    Smokeless tobacco: Former    Tobacco comments:     Quit 2001   Vaping Use    Vaping status: Never Used   Substance and Sexual Activity    Alcohol use: Never    Drug use: Never    Sexual activity: Not on file        Objective   /80 (BP Location: Left arm, Patient Position: Sitting, Cuff Size: Adult)   Pulse 72   Ht 5' 7\" (1.702 m)   Wt 77.6 kg (171 lb)   BMI 26.78 kg/m²      Physical Exam  Constitutional:       General: He is not in acute distress.     Appearance: He is well-developed.   HENT:      Head: Normocephalic and atraumatic.   Eyes:      General: No scleral icterus.     Conjunctiva/sclera: Conjunctivae normal.   Neck:      Trachea: " No tracheal deviation.   Cardiovascular:      Rate and Rhythm: Normal rate and regular rhythm.      Pulses:           Radial pulses are 2+ on the right side and 2+ on the left side.        Femoral pulses are 2+ on the right side and 2+ on the left side.       Dorsalis pedis pulses are detected w/ Doppler on the right side and detected w/ Doppler on the left side.        Posterior tibial pulses are detected w/ Doppler on the right side and detected w/ Doppler on the left side.      Heart sounds: Normal heart sounds.      Comments: Biphasic bilateral PT Doppler signals  Monophasic bilateral DP signals  Pulmonary:      Effort: Pulmonary effort is normal.      Breath sounds: Normal breath sounds.   Abdominal:      Palpations: Abdomen is soft. Mass: no appreciable aortic pulsation/aneurysm.      Tenderness: There is no abdominal tenderness.   Musculoskeletal:         General: Normal range of motion.      Cervical back: Normal range of motion and neck supple.   Skin:     General: Skin is warm and dry.   Neurological:      Mental Status: He is alert and oriented to person, place, and time.   Psychiatric:         Behavior: Behavior normal.     Lower extremity arterial duplex:      Administrative Statements   I have spent a total time of 30 minutes in caring for this patient on the day of the visit/encounter including Diagnostic results, Prognosis, Risks and benefits of tx options, Instructions for management, Patient and family education, Importance of tx compliance, Risk factor reductions, Impressions, Counseling / Coordination of care, Documenting in the medical record, Reviewing/placing orders in the medical record (including tests, medications, and/or procedures), and Obtaining or reviewing history  .

## 2025-02-21 NOTE — ASSESSMENT & PLAN NOTE
Patient is a 74-year-old gentleman who is referred to the office for bilateral feet burning pain and swelling.  Did have a lower extremity arterial duplex which was remarkable for a right TY of 0.51 with an SFA occlusion.  His left TY is 1.03.  Patient reports that symptoms are equal/symmetrical on both feet.  He does not report any typical claudication when specifically asked.  Is a former smoker.  At this time his symptoms appear consistent with diabetic peripheral neuropathy and not claudication symptoms.  Should he develop lifestyle limiting claudication, rest pain, and/or tissue loss he should notify the office.  He should continue on his aspirin.  He has been encouraged to get his diabetes under control as his hemoglobin A1c is greater than 11.  Recommend daily ambulation.  Recommend meticulous foot hygiene.  Recommend 6-month lower extremity arterial duplex and sooner if indicated.

## 2025-02-24 ENCOUNTER — NURSE TRIAGE (OUTPATIENT)
Age: 75
End: 2025-02-24

## 2025-02-24 DIAGNOSIS — Z98.61 S/P CORONARY ANGIOPLASTY: ICD-10-CM

## 2025-02-24 DIAGNOSIS — I25.10 ATHEROSCLEROSIS OF NATIVE CORONARY ARTERY OF NATIVE HEART WITHOUT ANGINA PECTORIS: Primary | ICD-10-CM

## 2025-02-24 RX ORDER — CLOPIDOGREL BISULFATE 75 MG/1
TABLET ORAL
Qty: 94 TABLET | Refills: 3 | Status: SHIPPED | OUTPATIENT
Start: 2025-02-24 | End: 2025-05-26

## 2025-02-24 NOTE — PROGRESS NOTES
Name: Wagner Ochoa      : 1950      MRN: [id number]  Encounter Provider: Suhas Edwards MD  Encounter Date: 2025   Encounter department: [facility] CARDIOLOG[facility] [place]    Return phone call to patient.  He informs that he has been experiencing shortness of breath and diarrheal symptoms since he has been taking Brilinta medication.  Also reports that metoprolol is giving him fatigued.  Reports that blood pressures have been in normal range.  It is possible that he is having side effects related to Brilinta.  Henceforth we are changing this medication.    Advising to stop taking Brilinta today and instead take Plavix 300 mg (4 pills of 75mg) 1 time later today or tomorrow morning and then subsequently take 1 pill daily in definitively.  Advised he should be on aspirin along with Plavix to keep the stent open.  Advised that he can lower the dose of metoprolol to half a pill twice daily instead of 1 pill to see if this will improve his fatigue symptoms.  Advised him to call us back if he continues to have symptoms or has other concerns.    Suhas Edwards MD

## 2025-02-24 NOTE — TELEPHONE ENCOUNTER
"Reason for Conversation: Pt is s/p cardiac cath on 2/10/2025. He was last seen on 2/17/2025.     Received call from pt stating ever since starting the brillinta and metoprolol, he has been experiencing many side effects. He stated he has been having headaches that comes and go, he cannot sleep, has been having diarrhea, a cough, dizziness when walking, and sob when laying down. He kept repeating on the phone that is believes these two medications are \"killing him\". He denies any hematuria, melena, nosebleeds, nor chest pain.     Advised pt will send a message to the provider to review and advise.     VS/Weight: (Note: Please include date/time vitals/weight were measured)  BP last night was 118/70    Pain: No    Risk Factors: Severe triple-vessel coronary artery disease, late presented STEMI, status post PCI with drug-eluting stent to OM with small vessel diabetic disease throughout coronaries     Recent relevant testing and date of testing: cardiac cath 2/10/2025    Medication: Metoprolol 25mg bid and brillinta 90mg  bid    Answer Assessment - Initial Assessment Questions  1. NAME of MEDICINE: \"What medicine(s) are you calling about?\"      Metoprolol 25mg bid and brillinta 90mg  bid  2. QUESTION: \"What is your question?\" (e.g., double dose of medicine, side effect)      Side effects   3. PRESCRIBER: \"Who prescribed the medicine?\" Reason: if prescribed by specialist, call should be referred to that group.      Dr. Edwards  4. SYMPTOMS: \"Do you have any symptoms?\" If Yes, ask: \"What symptoms are you having?\"  \"How bad are the symptoms (e.g., mild, moderate, severe)      Pt stated ever since starting these two medications, he has been having headaches that comes and go, he cannot sleep, has been having diarrhea, a cough, dizziness when walking, and sob when laying down.    Protocols used: Medication Question Call-Adult-[place]    "

## 2025-02-27 DIAGNOSIS — Z79.4 TYPE 2 DIABETES MELLITUS WITH HYPERGLYCEMIA, WITH LONG-TERM CURRENT USE OF INSULIN (HCC): ICD-10-CM

## 2025-02-27 DIAGNOSIS — G62.9 PERIPHERAL POLYNEUROPATHY: ICD-10-CM

## 2025-02-27 DIAGNOSIS — E11.65 TYPE 2 DIABETES MELLITUS WITH HYPERGLYCEMIA, WITH LONG-TERM CURRENT USE OF INSULIN (HCC): ICD-10-CM

## 2025-02-28 RX ORDER — GABAPENTIN 300 MG/1
CAPSULE ORAL
Qty: 180 CAPSULE | Refills: 1 | Status: SHIPPED | OUTPATIENT
Start: 2025-02-28

## 2025-02-28 RX ORDER — TIRZEPATIDE 12.5 MG/.5ML
INJECTION, SOLUTION SUBCUTANEOUS
Qty: 2 ML | Refills: 0 | OUTPATIENT
Start: 2025-02-28

## 2025-03-06 ENCOUNTER — TELEPHONE (OUTPATIENT)
Age: 75
End: 2025-03-06

## 2025-03-06 NOTE — TELEPHONE ENCOUNTER
[facility] called regarding script for Plavix received on 2/24.  They saw that pt had Brilinta filled at different pharmacy prior.  Advised her that we d/c'd his Brilinta on 2/24, and switched pt to Plavix.

## 2025-03-10 ENCOUNTER — RA CDI HCC (OUTPATIENT)
Dept: OTHER | Facility: HOSPITAL | Age: 75
End: 2025-03-10

## 2025-03-10 NOTE — PROGRESS NOTES
HCC coding opportunities          Chart Reviewed number of suggestions sent to Provider: 1     Patients Insurance     Medicare Insurance: [employer]          E11.51: Type 2 diabetes mellitus with diabetic peripheral angiopathy without gangrene [E11.51]    As per ICD 10 coding guidelines, DM & PVD are presumed to have a causal-effect relationship unless documented as unrelated please review and assess if applicable.

## 2025-03-11 PROBLEM — R50.9 FEVER: Status: RESOLVED | Noted: 2025-02-09 | Resolved: 2025-03-11

## 2025-03-17 DIAGNOSIS — M54.41 CHRONIC MIDLINE LOW BACK PAIN WITH RIGHT-SIDED SCIATICA: ICD-10-CM

## 2025-03-17 DIAGNOSIS — G89.29 CHRONIC MIDLINE LOW BACK PAIN WITH RIGHT-SIDED SCIATICA: ICD-10-CM

## 2025-03-17 RX ORDER — TRAMADOL HYDROCHLORIDE 50 MG/1
50 TABLET ORAL EVERY 12 HOURS PRN
Qty: 60 TABLET | Refills: 0 | Status: SHIPPED | OUTPATIENT
Start: 2025-03-17

## 2025-03-18 DIAGNOSIS — Z79.4 TYPE 2 DIABETES MELLITUS WITH HYPERGLYCEMIA, WITH LONG-TERM CURRENT USE OF INSULIN (HCC): ICD-10-CM

## 2025-03-18 DIAGNOSIS — E11.65 TYPE 2 DIABETES MELLITUS WITH HYPERGLYCEMIA, WITH LONG-TERM CURRENT USE OF INSULIN (HCC): ICD-10-CM

## 2025-03-18 RX ORDER — TIRZEPATIDE 12.5 MG/.5ML
INJECTION, SOLUTION SUBCUTANEOUS
Qty: 6 ML | Refills: 1 | Status: SHIPPED | OUTPATIENT
Start: 2025-03-18

## 2025-03-19 ENCOUNTER — OFFICE VISIT (OUTPATIENT)
Dept: FAMILY MEDICINE CLINIC | Facility: CLINIC | Age: 75
End: 2025-03-19
Payer: MEDICARE

## 2025-03-19 DIAGNOSIS — M79.672 PAIN IN BOTH FEET: ICD-10-CM

## 2025-03-19 DIAGNOSIS — N18.30 TYPE 2 DIABETES MELLITUS WITH STAGE 3 CHRONIC KIDNEY DISEASE, WITH LONG-TERM CURRENT USE OF INSULIN, UNSPECIFIED WHETHER STAGE 3A OR 3B CKD (HCC): Primary | ICD-10-CM

## 2025-03-19 DIAGNOSIS — E11.22 TYPE 2 DIABETES MELLITUS WITH STAGE 3 CHRONIC KIDNEY DISEASE, WITH LONG-TERM CURRENT USE OF INSULIN, UNSPECIFIED WHETHER STAGE 3A OR 3B CKD (HCC): Primary | ICD-10-CM

## 2025-03-19 DIAGNOSIS — M79.671 PAIN IN BOTH FEET: ICD-10-CM

## 2025-03-19 DIAGNOSIS — I21.9 MYOCARDIAL INFARCTION (HCC): ICD-10-CM

## 2025-03-19 DIAGNOSIS — Z79.4 TYPE 2 DIABETES MELLITUS WITH STAGE 3 CHRONIC KIDNEY DISEASE, WITH LONG-TERM CURRENT USE OF INSULIN, UNSPECIFIED WHETHER STAGE 3A OR 3B CKD (HCC): Primary | ICD-10-CM

## 2025-03-19 DIAGNOSIS — N18.2 CKD (CHRONIC KIDNEY DISEASE) STAGE 2, GFR 60-89 ML/MIN: ICD-10-CM

## 2025-03-19 DIAGNOSIS — I10 ESSENTIAL HYPERTENSION: ICD-10-CM

## 2025-03-19 LAB — SL AMB POCT HEMOGLOBIN AIC: 11.6 (ref ?–6.5)

## 2025-03-19 PROCEDURE — 83036 HEMOGLOBIN GLYCOSYLATED A1C: CPT | Performed by: INTERNAL MEDICINE

## 2025-03-19 PROCEDURE — 99214 OFFICE O/P EST MOD 30 MIN: CPT | Performed by: INTERNAL MEDICINE

## 2025-03-19 PROCEDURE — G2211 COMPLEX E/M VISIT ADD ON: HCPCS | Performed by: INTERNAL MEDICINE

## 2025-03-19 RX ORDER — BLOOD SUGAR DIAGNOSTIC
STRIP MISCELLANEOUS
Qty: 300 EACH | Refills: 3 | Status: SHIPPED | OUTPATIENT
Start: 2025-03-19

## 2025-03-19 RX ORDER — AMLODIPINE BESYLATE 10 MG/1
10 TABLET ORAL DAILY
COMMUNITY
Start: 2025-03-11

## 2025-03-19 RX ORDER — INSULIN ASPART 100 [IU]/ML
50 INJECTION, SUSPENSION SUBCUTANEOUS 2 TIMES DAILY WITH MEALS
Start: 2025-03-19

## 2025-03-19 RX ORDER — CLONIDINE HYDROCHLORIDE 0.2 MG/1
0.2 TABLET ORAL
COMMUNITY
Start: 2025-03-17

## 2025-03-19 NOTE — ASSESSMENT & PLAN NOTE
Continue follow-up with endocrinology team.  Hemoglobin A1c is elevated but stable.  Lab Results   Component Value Date    HGBA1C 11.6 (A) 03/19/2025       Orders:    [facility] Diabetic eye exam    POCT hemoglobin A1c    glucose blood (OneTouch Verio) test strip; Check blood sugars three times daily. Please substitute with appropriate alternative as covered by patient's insurance. Dx: E11.65

## 2025-03-19 NOTE — PROGRESS NOTES
Name: Wagner Ochoa      : 1950      MRN: [id number]  Encounter Provider: Ricardo Presley MD  Encounter Date: 3/19/2025   Encounter department: [facility]    Assessment & Plan  Type 2 diabetes mellitus with stage 3 chronic kidney disease, with long-term current use of insulin, unspecified whether stage 3a or 3b CKD (HCC)  Continue follow-up with endocrinology team.  Hemoglobin A1c is elevated but stable.  Lab Results   Component Value Date    HGBA1C 11.6 (A) 2025       Orders:    IRIS Diabetic eye exam    POCT hemoglobin A1c    glucose blood (OneTouch Verio) test strip; Check blood sugars three times daily. Please substitute with appropriate alternative as covered by patient's insurance. Dx: E11.65    Myocardial infarction (HCC)  History of recent MI, he is currently on DAPT.  Continue follow-up with cardiologist.  Orders:    insulin aspart protamine-insulin aspart (NovoLOG Mix 70/30 FlexPen) 100 Units/mL injection pen; Inject 50 Units under the skin 2 (two) times a day with meals    Essential hypertension  Blood pressures are overall well-controlled.  Continue current meds.       Pain in both feet  Likely due to peripheral neuropathy due to uncontrolled diabetes.       CKD (chronic kidney disease) stage 2, GFR 60-89 ml/min  Lab Results   Component Value Date    EGFR 75 2025    EGFR 83 02/10/2025    EGFR 73 2025    CREATININE 0.97 2025    CREATININE 0.89 02/10/2025    CREATININE 0.99 2025   Stable.              History of Present Illness     Patient came today for follow-up on his chronic medical problems.      Review of Systems   Constitutional:  Negative for chills and fever.   Respiratory:  Negative for cough, shortness of breath and wheezing.    Cardiovascular:  Negative for chest pain, palpitations and leg swelling.   Gastrointestinal:  Negative for abdominal distention, abdominal pain and anal bleeding.   Musculoskeletal:   Positive for gait problem and myalgias.   Skin:  Negative for color change.     Past Medical History:   Diagnosis Date    Diabetes mellitus (HCC)      Past Surgical History:   Procedure Laterality Date    APPENDECTOMY Right 1970    PARAMEDIAN INCISION    CARDIAC CATHETERIZATION Left 2/10/2025    Procedure: Cardiac Left Heart Cath;  Surgeon: Jada Cueto DO;  [facility];  Service: Cardiology    CARDIAC CATHETERIZATION N/A 2/10/2025    Procedure: Cardiac Coronary Angiogram;  Surgeon: Jada Cueto DO;  [facility];  Service: Cardiology    CARDIAC CATHETERIZATION N/A 2/10/2025    Procedure: Cardiac PCI;  Surgeon: Jada Cueto DO;  Location: [facility];  Service: Cardiology     No family history on file.  Social History     Tobacco Use    Smoking status: Former     Types: Cigarettes    Smokeless tobacco: Former    Tobacco comments:     Quit 2001   Vaping Use    Vaping status: Never Used   Substance and Sexual Activity    Alcohol use: Never    Drug use: Never    Sexual activity: Not on file     Current Outpatient Medications on File Prior to Visit   Medication Sig    albuterol (PROVENTIL HFA,VENTOLIN HFA) 90 mcg/act inhaler INHALE 2 PUFFS BY MOUTH EVERY 6 HOURS AS NEEDED FOR WHEEZING    amLODIPine (NORVASC) 10 mg tablet Take 10 mg by mouth daily    ammonium lactate (LAC-HYDRIN) 12 % cream APPLY TOPICALLY TO AFFECTED AREA ONCE DAILY AS NEEDED FOR DRY SKIN    atorvastatin (LIPITOR) 80 mg tablet TAKE ONE TABLET BY MOUTH ONCE DAILY WITH DINNER    Blood Glucose Monitoring Suppl (OneTouch Verio Reflect) w/Device KIT Check blood sugars three times daily. Please substitute with appropriate alternative as covered by patient's insurance. Dx: E11.65    cloNIDine (CATAPRES) 0.2 mg tablet Take 0.2 mg by mouth    clopidogrel (Plavix) 75 mg tablet Take 4 tablets (300 mg total) by mouth daily for 1 day, THEN 1 tablet (75 mg total) daily.    Continuous Blood Gluc Sensor  (FreeStyle Lucas 14 Day Sensor) MISC     Continuous Blood Gluc Sensor (FreeStyle Lucas 14 Day Sensor) MISC Use 1 each every 14 (fourteen) days    EASY COMFORT PEN NEEDLES 32G X 4 MM MISC     Empagliflozin 25 MG TABS Take 1 tablet (25 mg total) by mouth in the morning    ergocalciferol (VITAMIN D2) 50,000 units Take 1 capsule weekly x8 weeks, then 2000 units p.o. Daily.    Evolocumab 140 MG/ML SOAJ Inject 1 mL (140 mg total) under the skin every 14 (fourteen) days    ezetimibe (ZETIA) 10 mg tablet TAKE ONE TABLET BY MOUTH ONCE DAILY    gabapentin (NEURONTIN) 300 mg capsule TAKE ONE CAPSULE BY MOUTH THREE TIMES A DAY .START WITH ONCE DAILY .INCREASE TO TWICE A DAY FOR 7 DAY .THEN CONTINUE THREE TIMES A DAY    glucose blood (FREESTYLE LITE) test strip Use 1 each 3 (three) times a day Use as instructed    glucose blood (OneTouch Verio) test strip Check blood sugars three times daily. Please substitute with appropriate alternative as covered by patient's insurance. Dx: E11.65    Insulin Pen Needle 32G X 4 MM MISC Use with flexpens BID    losartan (COZAAR) 100 MG tablet Take 1 tablet (100 mg total) by mouth daily    metFORMIN (GLUCOPHAGE-XR) 750 mg 24 hr tablet TAKE ONE TABLET BY MOUTH ONCE DAILY WITH BREAKFAST    metoprolol tartrate (LOPRESSOR) 25 mg tablet Take 1 tablet (25 mg total) by mouth every 12 (twelve) hours    Multiple Vitamins-Minerals (Vitramyn) TABS TAKE ONE TABLET BY MOUTH ONCE DAILY    Narcan 4 MG/0.1ML nasal spray USE ONE SPRAY IN ONE NOSTRIL FOR ONE DOSE. IF THE DESIRED RESPONSE IS NOT OBTAINED AFTER 2-3 MINUTES, ADMINISTER ADDITIONAL DOSE IN OPPOSITE NOSTRIL USING NEW SPRAY    nitroglycerin (NITROSTAT) 0.4 mg SL tablet Place 1 tablet (0.4 mg total) under the tongue once as needed for chest pain for up to 1 dose    OneTouch Delica Lancets 33G MISC Check blood sugars three times daily. Please substitute with appropriate alternative as covered by patient's insurance. Dx: E11.65    pantoprazole (PROTONIX)  40 mg tablet Take 1 tablet (40 mg total) by mouth daily    SURE COMFORT LANCETS 30G MISC use as directed    Tirzepatide (Mounjaro) 12.5 MG/0.5ML SOAJ INJECT 12.5 MG SUBCUTANEOUSLY ONCE EVERY WEEK    traMADol (ULTRAM) 50 mg tablet Take 1 tablet (50 mg total) by mouth every 12 (twelve) hours as needed for moderate pain    triamcinolone (KENALOG) 0.1 % cream APPLY TOPICALLY TO AFFECTED AREA TWICE A DAY AS NEEDED FOR RASH    UltiCare Alcohol Swabs 70 % PADS USE TO CLEAN THE AREAS BEFORE TESTING BLOOD SUGAR OR/AND INJECTING INSULIN ONCE DAILY    aspirin 81 mg chewable tablet Chew 1 tablet (81 mg total) daily    carbamide peroxide (DEBROX) 6.5 % otic solution Administer 5 drops into ears 2 (two) times a day for 4 days    terazosin (HYTRIN) 10 MG capsule Take 10 mg by mouth     Allergies   Allergen Reactions    Iodinated Contrast Media Other (See Comments)     Facial swelling         Immunization History   Administered Date(s) Administered    COVID-19 J&J ("[employer] vaccine 0.5 mL 04/09/2021    INFLUENZA 10/23/2008, 10/12/2009, 11/12/2010, 11/02/2011, 09/19/2012, 11/15/2013, 10/29/2014, 11/02/2015, 10/20/2016    Influenza, high dose seasonal 0.7 mL 02/04/2019, 11/25/2019, 01/22/2021, 10/19/2021, 10/21/2022, 12/13/2023    Pneumococcal Conjugate 13-Valent 10/20/2016    Pneumococcal Polysaccharide PPV23 02/20/2009    Tdap 02/20/2009, 01/22/2021    Zoster Vaccine Recombinant 11/19/2022, 09/01/2023     Objective   /89 (BP Location: Left arm, Patient Position: Sitting, Cuff Size: Standard)   Pulse 63   Resp 14   Wt 75 kg (165 lb 6.4 oz)   SpO2 98%   BMI 25.91 kg/m²     Physical Exam  Constitutional:       General: He is not in acute distress.     Appearance: He is not toxic-appearing.   Cardiovascular:      Heart sounds: No murmur heard.     No gallop.   Pulmonary:      Effort: No respiratory distress.      Breath sounds: No wheezing or rales.

## 2025-03-20 VITALS
SYSTOLIC BLOOD PRESSURE: 139 MMHG | RESPIRATION RATE: 14 BRPM | DIASTOLIC BLOOD PRESSURE: 89 MMHG | OXYGEN SATURATION: 98 % | BODY MASS INDEX: 25.91 KG/M2 | HEART RATE: 63 BPM | WEIGHT: 165.4 LBS

## 2025-03-20 NOTE — ASSESSMENT & PLAN NOTE
Lab Results   Component Value Date    EGFR 75 02/11/2025    EGFR 83 02/10/2025    EGFR 73 02/09/2025    CREATININE 0.97 02/11/2025    CREATININE 0.89 02/10/2025    CREATININE 0.99 02/09/2025   Stable.

## 2025-03-27 DIAGNOSIS — Z79.4 TYPE 2 DIABETES MELLITUS WITH HYPERGLYCEMIA, WITH LONG-TERM CURRENT USE OF INSULIN (HCC): ICD-10-CM

## 2025-03-27 DIAGNOSIS — E11.65 TYPE 2 DIABETES MELLITUS WITH HYPERGLYCEMIA, WITH LONG-TERM CURRENT USE OF INSULIN (HCC): ICD-10-CM

## 2025-03-28 RX ORDER — ALCOHOL ANTISEPTIC PADS
PADS, MEDICATED (EA) TOPICAL
Qty: 100 EACH | Refills: 5 | Status: SHIPPED | OUTPATIENT
Start: 2025-03-28

## 2025-03-31 ENCOUNTER — TELEPHONE (OUTPATIENT)
Dept: CARDIOLOGY CLINIC | Facility: CLINIC | Age: 75
End: 2025-03-31

## 2025-04-11 DIAGNOSIS — N18.30 TYPE 2 DIABETES MELLITUS WITH STAGE 3 CHRONIC KIDNEY DISEASE, WITH LONG-TERM CURRENT USE OF INSULIN, UNSPECIFIED WHETHER STAGE 3A OR 3B CKD (HCC): ICD-10-CM

## 2025-04-11 DIAGNOSIS — Z79.4 TYPE 2 DIABETES MELLITUS WITH STAGE 3 CHRONIC KIDNEY DISEASE, WITH LONG-TERM CURRENT USE OF INSULIN, UNSPECIFIED WHETHER STAGE 3A OR 3B CKD (HCC): ICD-10-CM

## 2025-04-11 DIAGNOSIS — E11.22 TYPE 2 DIABETES MELLITUS WITH STAGE 3 CHRONIC KIDNEY DISEASE, WITH LONG-TERM CURRENT USE OF INSULIN, UNSPECIFIED WHETHER STAGE 3A OR 3B CKD (HCC): ICD-10-CM

## 2025-04-11 RX ORDER — METFORMIN HYDROCHLORIDE 750 MG/1
750 TABLET, EXTENDED RELEASE ORAL
Qty: 90 TABLET | Refills: 1 | Status: SHIPPED | OUTPATIENT
Start: 2025-04-11

## 2025-04-15 ENCOUNTER — OFFICE VISIT (OUTPATIENT)
Dept: CARDIOLOGY CLINIC | Facility: CLINIC | Age: 75
End: 2025-04-15
Payer: MEDICARE

## 2025-04-15 VITALS
SYSTOLIC BLOOD PRESSURE: 140 MMHG | DIASTOLIC BLOOD PRESSURE: 60 MMHG | HEART RATE: 64 BPM | BODY MASS INDEX: 26.97 KG/M2 | WEIGHT: 172.2 LBS

## 2025-04-15 DIAGNOSIS — E78.5 HYPERLIPIDEMIA LDL GOAL <50: ICD-10-CM

## 2025-04-15 DIAGNOSIS — I25.83 CORONARY ARTERY DISEASE DUE TO LIPID RICH PLAQUE: Primary | ICD-10-CM

## 2025-04-15 DIAGNOSIS — Z79.4 TYPE 2 DIABETES MELLITUS WITH HYPERGLYCEMIA, WITH LONG-TERM CURRENT USE OF INSULIN (HCC): ICD-10-CM

## 2025-04-15 DIAGNOSIS — I25.10 CORONARY ARTERY DISEASE DUE TO LIPID RICH PLAQUE: Primary | ICD-10-CM

## 2025-04-15 DIAGNOSIS — E11.65 TYPE 2 DIABETES MELLITUS WITH HYPERGLYCEMIA, WITH LONG-TERM CURRENT USE OF INSULIN (HCC): ICD-10-CM

## 2025-04-15 DIAGNOSIS — I10 ESSENTIAL HYPERTENSION: ICD-10-CM

## 2025-04-15 PROCEDURE — 99214 OFFICE O/P EST MOD 30 MIN: CPT

## 2025-04-15 NOTE — PROGRESS NOTES
Wagner Ochoa  1950  [id number]  CARDIOVASC PHYSICIAN  [address] [facility] [address]  [phone]  [phone]    1. Coronary artery disease due to lipid rich plaque        2. Essential hypertension        3. Type 2 diabetes mellitus with hyperglycemia, with long-term current use of insulin (HCC)        4. Hyperlipidemia LDL goal <50          Assessment/Plan  TODAY (04/15/25):   He has not started Repatha due to not having a prior authorization. Will message Dr. Edwards to see if he has any messages regarding this in the future.   Continue ASA and Plavix   Discussed his diabetes control with him. Encouraged him to try to really watch his diet over the next couple months  Advised cardiac rehab and discussed that this is good for one year after he has his stents  RTO 09/11/25 with Dr. Edwards   Multivessel coronary artery disease   - NSTEMI 02/10/25: s/p PCI/DARLENE x 1 to the OM with notable small vessel disease throughout the coronary system, severe dLAD not amenable to PCI, severe ostial and Tammy not amenable to PCI, RCA not amenable to PCI  - current rx: ASA 81 mg daily, Plavix 75 mg daily, SL nitro prn  Hypertension   - TTE 02/08/25: LVEF 55% with grade I DD, inferior akinesis, aortic valve sclerosis   - BP in office: 140/60 mmHg  - current rx: amlodipine 10 mg daily, clonidine 0.2 mg daily, losartan 100 mg daily, lopressor 25 mg BID  Hyperlipidemia  - lipid panel 07/02/25: cholesterol 240, triglycerides 139, HDL 45,   - current rx: atorvastatin 80 mg daily, zetia 10 mg daily, and Repatha 140 mg [id number]  TIIDM- A1c 11.9 on 03/19/25  CKD stage 2  Former smoker    Interval History:   This is a 75 y/o male with a PMH of multivessel CAD, HTN, HLD, TIIDM, CKD stage 2 and former smoker who is presenting today for follow up. He was last seen in office 02/17/25 by Dr. Edwards. At this visit, he was doing overall stable with no cardiac complaints. He was started on Repatha and encouraged to start cardiac  rehab.    Pt states he has been doing overall well since he last saw Dr. Edwards. He has not started Repatha because his prior authorization got denied. Admits he is unable to do cardiac rehab due to the pain in his feet and neuropathy. I discussed with him that a big part of this is from his uncontrolled DM. Diet consists of some red meats, sweets, rice, plantains. We talked about the Mediterranean diet and this could help his sugars and overall cholesterol levels. He is due for a repeat A1c in the summer. Encouraged him to really work on his diet over the next couple months as his extremity pain is likely coming from his diabetes. He continues to take DAPT as directed. States he carries a case of water a few days ago and his chest was a little sore but other than that denies anginal chest pain, SOB, YOUNG, palpitations, flutters in the chest and syncope.      Past Medical History:   Diagnosis Date    Diabetes mellitus (HCC)      Social History     Socioeconomic History    Marital status: /Civil Union     Spouse name: Not on file    Number of children: Not on file    Years of education: Not on file    Highest education level: Not on file   Occupational History    Not on file   Tobacco Use    Smoking status: Former     Types: Cigarettes    Smokeless tobacco: Former    Tobacco comments:     Quit 2001   Vaping Use    Vaping status: Never Used   Substance and Sexual Activity    Alcohol use: Never    Drug use: Never    Sexual activity: Not on file   Other Topics Concern    Not on file   Social History Narrative    Not on file     Social Drivers of Health     Financial Resource Strain: Low Risk  (6/9/2023)    Overall Financial Resource Strain (CARDIA)     Difficulty of Paying Living Expenses: Not hard at all   Food Insecurity: No Food Insecurity (6/17/2024)    Nursing - Inadequate Food Risk Classification     Worried About Running Out of Food in the Last Year: Never true     Ran Out of Food in the Last Year: Never  true     Ran Out of Food in the Last Year: Not on file   Transportation Needs: No Transportation Needs (6/17/2024)    PRAPARE - Transportation     Lack of Transportation (Medical): No     Lack of Transportation (Non-Medical): No   Physical Activity: Not on file   Stress: Not on file   Social Connections: Not on file   Intimate Partner Violence: Not on file   Housing Stability: Unknown (6/17/2024)    Housing Stability Vital Sign     Unable to Pay for Housing in the Last Year: No     Number of Times Moved in the Last Year: Not on file     Homeless in the Last Year: Not on file      No family history on file.  Past Surgical History:   Procedure Laterality Date    APPENDECTOMY Right 1970    PARAMEDIAN INCISION    CARDIAC CATHETERIZATION Left 2/10/2025    Procedure: Cardiac Left Heart Cath;  Surgeon: Jada Cueto DO;  [facility]: [facility];  Service: Cardiology    CARDIAC CATHETERIZATION N/A 2/10/2025    Procedure: Cardiac Coronary Angiogram;  Surgeon: Jada Cueto DO;  [facility];  Service: Cardiology    CARDIAC CATHETERIZATION N/A 2/10/2025    Procedure: Cardiac PCI;  Surgeon: Jada Cueto DO;  [facility]: [facility];  Service: Cardiology       Current Outpatient Medications:     albuterol (PROVENTIL HFA,VENTOLIN HFA) 90 mcg/act inhaler, INHALE 2 PUFFS BY MOUTH EVERY 6 HOURS AS NEEDED FOR WHEEZING, Disp: 6.7 g, Rfl: 4    amLODIPine (NORVASC) 10 mg tablet, Take 10 mg by mouth daily, Disp: , Rfl:     ammonium lactate (LAC-HYDRIN) 12 % cream, APPLY TOPICALLY TO AFFECTED AREA ONCE DAILY AS NEEDED FOR DRY SKIN, Disp: 385 g, Rfl: 0    aspirin 81 mg chewable tablet, Chew 1 tablet (81 mg total) daily, Disp: 30 tablet, Rfl: 0    atorvastatin (LIPITOR) 80 mg tablet, TAKE ONE TABLET BY MOUTH ONCE DAILY WITH DINNER, Disp: 90 tablet, Rfl: 1    Blood Glucose Monitoring Suppl (OneTouch Verio Reflect) w/Device KIT, Check blood sugars three times daily. Please substitute with  appropriate alternative as covered by patient's insurance. Dx: E11.65, Disp: 1 kit, Rfl: 0    carbamide peroxide (DEBROX) 6.5 % otic solution, Administer 5 drops into ears 2 (two) times a day for 4 days, Disp: 15 mL, Rfl: 0    cloNIDine (CATAPRES) 0.2 mg tablet, Take 0.2 mg by mouth, Disp: , Rfl:     clopidogrel (Plavix) 75 mg tablet, Take 4 tablets (300 mg total) by mouth daily for 1 day, THEN 1 tablet (75 mg total) daily., Disp: 94 tablet, Rfl: 3    Continuous Blood Gluc Sensor (FreeStyle Lucas 14 Day Sensor) MISC, , Disp: , Rfl:     Continuous Blood Gluc Sensor (FreeStyle Lucas 14 Day Sensor) MISC, Use 1 each every 14 (fourteen) days, Disp: , Rfl:     EASY COMFORT PEN NEEDLES 32G X 4 MM MISC, , Disp: , Rfl:     Empagliflozin 25 MG TABS, Take 1 tablet (25 mg total) by mouth in the morning, Disp: 90 tablet, Rfl: 2    ergocalciferol (VITAMIN D2) 50,000 units, Take 1 capsule weekly x8 weeks, then 2000 units p.o. Daily., Disp: 8 capsule, Rfl: 1    Evolocumab 140 MG/ML SOAJ, Inject 1 mL (140 mg total) under the skin every 14 (fourteen) days, Disp: 2 mL, Rfl: 5    ezetimibe (ZETIA) 10 mg tablet, TAKE ONE TABLET BY MOUTH ONCE DAILY, Disp: 90 tablet, Rfl: 1    gabapentin (NEURONTIN) 300 mg capsule, TAKE ONE CAPSULE BY MOUTH THREE TIMES A DAY .START WITH ONCE DAILY .INCREASE TO TWICE A DAY FOR 7 DAY .THEN CONTINUE THREE TIMES A DAY, Disp: 180 capsule, Rfl: 1    glucose blood (FREESTYLE LITE) test strip, Use 1 each 3 (three) times a day Use as instructed, Disp: 300 strip, Rfl: 1    glucose blood (OneTouch Verio) test strip, Check blood sugars three times daily. Please substitute with appropriate alternative as covered by patient's insurance. Dx: E11.65, Disp: 300 each, Rfl: 3    glucose blood (OneTouch Verio) test strip, Check blood sugars three times daily. Please substitute with appropriate alternative as covered by patient's insurance. Dx: E11.65, Disp: 300 each, Rfl: 3    insulin aspart protamine-insulin aspart (NovoLOG  Mix 70/30 FlexPen) 100 Units/mL injection pen, Inject 50 Units under the skin 2 (two) times a day with meals, Disp: , Rfl:     Insulin Pen Needle 32G X 4 MM MISC, Use with flexpens BID, Disp: , Rfl:     losartan (COZAAR) 100 MG tablet, Take 1 tablet (100 mg total) by mouth daily, Disp: 90 tablet, Rfl: 3    metFORMIN (GLUCOPHAGE-XR) 750 mg 24 hr tablet, TAKE ONE TABLET BY MOUTH ONCE DAILY WITH BREAKFAST, Disp: 90 tablet, Rfl: 1    metoprolol tartrate (LOPRESSOR) 25 mg tablet, Take 1 tablet (25 mg total) by mouth every 12 (twelve) hours, Disp: 180 tablet, Rfl: 3    Multiple Vitamins-Minerals (Vitramyn) TABS, TAKE ONE TABLET BY MOUTH ONCE DAILY, Disp: 90 tablet, Rfl: 3    Narcan 4 MG/0.1ML nasal spray, USE ONE SPRAY IN ONE NOSTRIL FOR ONE DOSE. IF THE DESIRED RESPONSE IS NOT OBTAINED AFTER 2-3 MINUTES, ADMINISTER ADDITIONAL DOSE IN OPPOSITE NOSTRIL USING NEW SPRAY, Disp: 2 each, Rfl: 0    nitroglycerin (NITROSTAT) 0.4 mg SL tablet, Place 1 tablet (0.4 mg total) under the tongue once as needed for chest pain for up to 1 dose, Disp: 20 tablet, Rfl: 0    OneTouch Delica Lancets 33G MISC, Check blood sugars three times daily. Please substitute with appropriate alternative as covered by patient's insurance. Dx: E11.65, Disp: 300 each, Rfl: 3    pantoprazole (PROTONIX) 40 mg tablet, Take 1 tablet (40 mg total) by mouth daily, Disp: 90 tablet, Rfl: 2    SURE COMFORT LANCETS 30G MISC, use as directed, Disp: , Rfl:     terazosin (HYTRIN) 10 MG capsule, Take 10 mg by mouth, Disp: , Rfl:     Tirzepatide (Mounjaro) 12.5 MG/0.5ML SOAJ, INJECT 12.5 MG SUBCUTANEOUSLY ONCE EVERY WEEK, Disp: 6 mL, Rfl: 1    traMADol (ULTRAM) 50 mg tablet, Take 1 tablet (50 mg total) by mouth every 12 (twelve) hours as needed for moderate pain, Disp: 60 tablet, Rfl: 0    triamcinolone (KENALOG) 0.1 % cream, APPLY TOPICALLY TO AFFECTED AREA TWICE A DAY AS NEEDED FOR RASH, Disp: 30 g, Rfl: 0    UltiCare Alcohol Swabs 70 % PADS, USE TO CLEAN THE AREAS  BEFORE TESTING BLOOD SUGAR OR/AND INJECTING INSULIN ONCE DAILY, Disp: 100 each, Rfl: 5  Allergies   Allergen Reactions    Iodinated Contrast Media Other (See Comments)     Facial swelling           Labs:     Chemistry        Component Value Date/Time     06/09/2018 0801    K 3.9 02/11/2025 1033    K 4.5 06/09/2018 0801     02/11/2025 1033     06/09/2018 0801    CO2 25 02/11/2025 1033    CO2 25 06/09/2018 0801    BUN 25 02/11/2025 1033    BUN 16 06/09/2018 0801    CREATININE 0.97 02/11/2025 1033        Component Value Date/Time    CALCIUM 8.9 02/11/2025 1033    CALCIUM 9.6 06/09/2018 0801    ALKPHOS 69 02/07/2025 1016    ALKPHOS 112 06/09/2018 0801     (H) 02/07/2025 1016    AST 18 06/09/2018 0801    ALT 34 02/07/2025 1016    ALT 19 06/09/2018 0801    BILITOT 0.5 06/09/2018 0801        Lab Results   Component Value Date    CHOL 227 (H) 06/09/2018     Lab Results   Component Value Date    HDL 45 07/01/2024    HDL 51 05/24/2023    HDL 35 (L) 04/30/2021     Lab Results   Component Value Date    LDLCALC 167 (H) 07/01/2024    LDLCALC 170 (H) 05/24/2023    LDLCALC 165 (H) 04/30/2021     Lab Results   Component Value Date    TRIG 139 07/01/2024    TRIG 93 05/24/2023    TRIG 88 04/30/2021     Imaging: No results found.    Review of Systems   Constitutional: Negative for chills, diaphoresis, fever, malaise/fatigue and weight gain.   Cardiovascular:  Negative for chest pain, dyspnea on exertion, irregular heartbeat, leg swelling, near-syncope, orthopnea, palpitations, paroxysmal nocturnal dyspnea and syncope.   Respiratory:  Negative for cough, shortness of breath, sleep disturbances due to breathing, snoring and wheezing.    Skin:  Negative for rash.   Gastrointestinal:  Negative for bloating, abdominal pain and nausea.   Neurological:  Negative for dizziness and light-headedness.       Vitals:    04/15/25 1337   BP: 140/60   Pulse: 64     Vitals:    04/15/25 1337   Weight: 78.1 kg (172 lb 3.2 oz)          Body mass index is 26.97 kg/m².    Physical Exam  Vitals and nursing note reviewed.   Constitutional:       General: He is not in acute distress.     Appearance: Normal appearance. He is not ill-appearing.   HENT:      Head: Normocephalic and atraumatic.      Nose: Nose normal.      Mouth/Throat:      Mouth: Mucous membranes are moist.   Eyes:      Conjunctiva/sclera: Conjunctivae normal.   Cardiovascular:      Rate and Rhythm: Normal rate and regular rhythm.      Pulses:           Radial pulses are 2+ on the right side and 2+ on the left side.      Heart sounds: S1 normal and S2 normal. No murmur heard.  Pulmonary:      Effort: Pulmonary effort is normal. No respiratory distress.      Breath sounds: Normal breath sounds. No stridor. No wheezing, rhonchi or rales.   Abdominal:      General: There is no distension.   Musculoskeletal:      Cervical back: Neck supple.      Right lower leg: No edema.      Left lower leg: No edema.   Skin:     General: Skin is warm.      Capillary Refill: Capillary refill takes less than 2 seconds.      Comments: Right foot has mild with some venous discoloration   Neurological:      General: No focal deficit present.      Mental Status: He is alert.   Psychiatric:         Thought Content: Thought content normal.

## 2025-05-08 DIAGNOSIS — R06.02 SHORTNESS OF BREATH: ICD-10-CM

## 2025-05-09 RX ORDER — ALBUTEROL SULFATE 90 UG/1
2 INHALANT RESPIRATORY (INHALATION) EVERY 6 HOURS PRN
Qty: 6.7 G | Refills: 3 | Status: SHIPPED | OUTPATIENT
Start: 2025-05-09

## 2025-06-20 ENCOUNTER — OFFICE VISIT (OUTPATIENT)
Dept: FAMILY MEDICINE CLINIC | Facility: CLINIC | Age: 75
End: 2025-06-20
Payer: MEDICARE

## 2025-06-20 ENCOUNTER — TELEPHONE (OUTPATIENT)
Dept: ADMINISTRATIVE | Facility: OTHER | Age: 75
End: 2025-06-20

## 2025-06-20 VITALS
BODY MASS INDEX: 27.56 KG/M2 | HEIGHT: 65 IN | DIASTOLIC BLOOD PRESSURE: 58 MMHG | WEIGHT: 165.4 LBS | OXYGEN SATURATION: 98 % | SYSTOLIC BLOOD PRESSURE: 114 MMHG | HEART RATE: 69 BPM

## 2025-06-20 DIAGNOSIS — Z12.5 SCREENING FOR PROSTATE CANCER: ICD-10-CM

## 2025-06-20 DIAGNOSIS — D68.2 HEREDITARY DEFICIENCY OF OTHER CLOTTING FACTORS (HCC): ICD-10-CM

## 2025-06-20 DIAGNOSIS — E78.5 HYPERLIPIDEMIA LDL GOAL <50: ICD-10-CM

## 2025-06-20 DIAGNOSIS — Z79.4 TYPE 2 DIABETES MELLITUS WITH STAGE 3 CHRONIC KIDNEY DISEASE, WITH LONG-TERM CURRENT USE OF INSULIN, UNSPECIFIED WHETHER STAGE 3A OR 3B CKD (HCC): ICD-10-CM

## 2025-06-20 DIAGNOSIS — E11.22 TYPE 2 DIABETES MELLITUS WITH STAGE 3 CHRONIC KIDNEY DISEASE, WITH LONG-TERM CURRENT USE OF INSULIN, UNSPECIFIED WHETHER STAGE 3A OR 3B CKD (HCC): ICD-10-CM

## 2025-06-20 DIAGNOSIS — I25.10 CORONARY ARTERY DISEASE INVOLVING NATIVE CORONARY ARTERY, UNSPECIFIED WHETHER ANGINA PRESENT, UNSPECIFIED WHETHER NATIVE OR TRANSPLANTED HEART: ICD-10-CM

## 2025-06-20 DIAGNOSIS — Z13.29 SCREENING FOR HYPOTHYROIDISM: ICD-10-CM

## 2025-06-20 DIAGNOSIS — Z13.89 SCREENING FOR BLOOD OR PROTEIN IN URINE: ICD-10-CM

## 2025-06-20 DIAGNOSIS — N18.30 TYPE 2 DIABETES MELLITUS WITH STAGE 3 CHRONIC KIDNEY DISEASE, WITH LONG-TERM CURRENT USE OF INSULIN, UNSPECIFIED WHETHER STAGE 3A OR 3B CKD (HCC): ICD-10-CM

## 2025-06-20 DIAGNOSIS — Z13.0 SCREENING FOR DEFICIENCY ANEMIA: ICD-10-CM

## 2025-06-20 DIAGNOSIS — Z00.00 MEDICARE ANNUAL WELLNESS VISIT, SUBSEQUENT: Primary | ICD-10-CM

## 2025-06-20 DIAGNOSIS — Z12.11 COLON CANCER SCREENING: ICD-10-CM

## 2025-06-20 DIAGNOSIS — E78.2 MIXED HYPERLIPIDEMIA: ICD-10-CM

## 2025-06-20 DIAGNOSIS — F11.20 CONTINUOUS OPIOID DEPENDENCE (HCC): ICD-10-CM

## 2025-06-20 LAB
CREAT UR-MCNC: 207.9 MG/DL
MICROALBUMIN UR-MCNC: 160.8 MG/L
MICROALBUMIN/CREAT 24H UR: 77 MG/G CREATININE (ref 0–30)
SL AMB POCT HEMOGLOBIN AIC: 14.8 (ref ?–6.5)

## 2025-06-20 PROCEDURE — G2211 COMPLEX E/M VISIT ADD ON: HCPCS | Performed by: INTERNAL MEDICINE

## 2025-06-20 PROCEDURE — G0439 PPPS, SUBSEQ VISIT: HCPCS | Performed by: INTERNAL MEDICINE

## 2025-06-20 PROCEDURE — 99214 OFFICE O/P EST MOD 30 MIN: CPT | Performed by: INTERNAL MEDICINE

## 2025-06-20 PROCEDURE — 82570 ASSAY OF URINE CREATININE: CPT | Performed by: INTERNAL MEDICINE

## 2025-06-20 PROCEDURE — 82043 UR ALBUMIN QUANTITATIVE: CPT | Performed by: INTERNAL MEDICINE

## 2025-06-20 PROCEDURE — 83036 HEMOGLOBIN GLYCOSYLATED A1C: CPT | Performed by: INTERNAL MEDICINE

## 2025-06-20 RX ORDER — TIRZEPATIDE 15 MG/.5ML
15 INJECTION, SOLUTION SUBCUTANEOUS WEEKLY
Qty: 6 ML | Refills: 0 | Status: SHIPPED | OUTPATIENT
Start: 2025-06-20

## 2025-06-20 NOTE — ASSESSMENT & PLAN NOTE
Diabetes is highly uncontrolled, looks like he is compliant with his medications, we will increase his Mounjaro up to 15 mg weekly, continue with current dose of metformin, he is on NovoLog 50 units twice a day and Jardiance.  He is not compliant with his low-carb diet, he will try to limit his carbs and sweets.  Follow-up with clinical pharmacist.  Lab Results   Component Value Date    HGBA1C 14.8 (A) 06/20/2025       Orders:    POCT hemoglobin A1c    Albumin / creatinine urine ratio    Ambulatory referral to clinical pharmacy; Future    Tirzepatide (Mounjaro) 15 MG/0.5ML SOAJ; Inject 15 mg under the skin once a week    Comprehensive metabolic panel; Future    Hemoglobin A1C; Future

## 2025-06-20 NOTE — PROGRESS NOTES
Name: Wagner Ochoa      : 1950      MRN: [id number]  Encounter Provider: Ricardo Presley MD  Encounter Date: 2025   Encounter department: [facility] PRIMARY CARE  :  Assessment & Plan  Type 2 diabetes mellitus with stage 3 chronic kidney disease, with long-term current use of insulin, unspecified whether stage 3a or 3b CKD (HCC)  Diabetes is highly uncontrolled, looks like he is compliant with his medications, we will increase his Mounjaro up to 15 mg weekly, continue with current dose of metformin, he is on NovoLog 50 units twice a day and Jardiance.  He is not compliant with his low-carb diet, he will try to limit his carbs and sweets.  Follow-up with clinical pharmacist.  Lab Results   Component Value Date    HGBA1C 14.8 (A) 2025       Orders:    POCT hemoglobin A1c    Albumin / creatinine urine ratio    Ambulatory referral to clinical pharmacy; Future    Tirzepatide (Mounjaro) 15 MG/0.5ML SOAJ; Inject 15 mg under the skin once a week    Comprehensive metabolic panel; Future    Hemoglobin A1C; Future    Hereditary deficiency of other clotting factors (HCC)         Continuous opioid dependence (HCC)  He could not provide sample of his urine today as he already went to the bathroom, we will plan to do it in 3 months.  He uses tramadol sparingly, denies any side effects.  Orders:    [facility] All Prescribed Meds and Special Instructions    Amphetamines, Methamphetamines    Butalbital    Phenobarbital    Secobarbital    Alprazolam    Clonazepam    Diazepam    Lorazepam    Gabapentin    Pregabalin    Cocaine    Heroin    Buprenorphine    Levorphanol    Meperidine    Naltrexone    Fentanyl    Methadone    Oxycodone    Tapentadol    THC    Tramadol    Codeine, Hydrocodone, Hydropmorphone, Morphine    Bath Salts    Ethyl Glucuronide/Ethyl Sulfate    Kratom    Spice    Methylphenidate    Phentermine    Validity Oxidant    Validity Creatinine    Validity pH    Validity  Specific    Xylazine Definitive Test    Mixed hyperlipidemia  Recheck lipid panel, continue current dose of Zetia Repatha and Lipitor.  Orders:    Lipid panel; Future    Screening for deficiency anemia    Orders:    CBC and differential; Future    Screening for hypothyroidism    Orders:    TSH, 3rd generation with Free T4 reflex; Future    Screening for blood or protein in urine    Orders:    UA (URINE) with reflex to Scope; Future    Screening for prostate cancer    Orders:    PSA, Total Screen; Future    Coronary artery disease involving native coronary artery, unspecified whether angina present, unspecified whether native or transplanted heart  Continue with current meds.  Orders:    Evolocumab 140 MG/ML SOAJ; Inject 1 mL (140 mg total) under the skin every 14 (fourteen) days    Hyperlipidemia LDL goal <50    Orders:    Evolocumab 140 MG/ML SOAJ; Inject 1 mL (140 mg total) under the skin every 14 (fourteen) days    Colon cancer screening    Orders:    Cologuard    Medicare annual wellness visit, subsequent            Preventive health issues were discussed with patient, and age appropriate screening tests were ordered as noted in patient's After Visit Summary. Personalized health advice and appropriate referrals for health education or preventive services given if needed, as noted in patient's After Visit Summary.    History of Present Illness     Patient came today for Medicare wellness visit and to follow-up on his chronic problems.  He is up-to-date on his vaccinations except.  He agreed for cologuard and PSA.  He does not follow a low-carb diet.  He is trying to be physically active.         Patient Care Team:  Ricardo Presley MD as PCP - General (Internal Medicine)  [facility] (Podiatry)    Review of Systems   Constitutional:  Negative for chills, fatigue and fever.   Respiratory:  Negative for cough, chest tightness and shortness of breath.    Cardiovascular:  Negative for chest  pain, palpitations and leg swelling.   Gastrointestinal:  Negative for abdominal distention, abdominal pain, blood in stool, constipation, diarrhea and nausea.   Genitourinary:  Negative for difficulty urinating and dysuria.   Musculoskeletal:  Negative for arthralgias, back pain, gait problem, joint swelling, myalgias and neck pain.   Skin:  Negative for rash.   Neurological:  Negative for dizziness, weakness, numbness and headaches.   Psychiatric/Behavioral:  Negative for agitation.      Medical History Reviewed by provider this encounter:       Annual Wellness Visit Questionnaire       Health Risk Assessment:   Patient rates overall health as very good. Patient feels that their physical health rating is much better. Patient is satisfied with their life. Eyesight was rated as much worse. Hearing was rated as much worse. Patient feels that their emotional and mental health rating is same. Patients states they are never, rarely angry. Patient states they are never, rarely unusually tired/fatigued. Pain experienced in the last 7 days has been none. Patient states that he has experienced no weight loss or gain in last 6 months.     Depression Screening:   PHQ-2 Score: 0  PHQ-9 Score: 0      Fall Risk Screening:   In the past year, patient has experienced: no history of falling in past year      Home Safety:  Patient has trouble with stairs inside or outside of their home. Patient has working smoke alarms and has working carbon monoxide detector. Home safety hazards include: loose rugs on the floor.     Nutrition:   Current diet is Diabetic.     Medications:   Patient is not currently taking any over-the-counter supplements. Patient is able to manage medications.     Activities of Daily Living (ADLs)/Instrumental Activities of Daily Living (IADLs):   Walk and transfer into and out of bed and chair?: Yes  Dress and groom yourself?: Yes    Bathe or shower yourself?: Yes    Feed yourself? Yes  Do your  "laundry/housekeeping?: Yes  Manage your money, pay your bills and track your expenses?: Yes  Make your own meals?: Yes    Do your own shopping?: Yes    Previous Hospitalizations:   Any hospitalizations or ED visits within the last 12 months?: No      Preventive Screenings      Cardiovascular Screening:    General: Screening Not Indicated and History Lipid Disorder      Diabetes Screening:     General: Screening Not Indicated and History Diabetes      Prostate Cancer Screening:    General: Screening Not Indicated      Abdominal Aortic Aneurysm (AAA) Screening:    Risk factors include: tobacco use        Lung Cancer Screening:     General: Screening Not Indicated      Hepatitis C Screening:    General: Screening Current    Screening, Brief Intervention, and Referral to Treatment (SBIRT)     Screening  Typical number of drinks in a day: 0  Typical number of drinks in a week: 0  Interpretation: Low risk drinking behavior.    Social Drivers of Health     Financial Resource Strain: Low Risk  (6/9/2023)    Overall Financial Resource Strain (CARDIA)     Difficulty of Paying Living Expenses: Not hard at all   Food Insecurity: No Food Insecurity (6/20/2025)    Nursing - Inadequate Food Risk Classification     Worried About Running Out of Food in the Last Year: Never true     Ran Out of Food in the Last Year: Never true   Transportation Needs: No Transportation Needs (6/20/2025)    PRAPARE - Transportation     Lack of Transportation (Medical): No     Lack of Transportation (Non-Medical): No   Housing Stability: Low Risk  (6/20/2025)    Housing Stability Vital Sign     Unable to Pay for Housing in the Last Year: No     Number of Times Moved in the Last Year: 0     Homeless in the Last Year: No   Utilities: Not At Risk (6/20/2025)    [facility] Utilities     Threatened with loss of utilities: No     No results found.    Objective   /58   Pulse 69   Ht 5' 4.96\" (1.65 m)   Wt 75 kg (165 lb 6.4 oz)   SpO2 98%   BMI 27.56 kg/m² "     Physical Exam  Constitutional:       General: He is not in acute distress.     Appearance: He is not ill-appearing or toxic-appearing.     Cardiovascular:      Pulses: no weak pulses.           Dorsalis pedis pulses are 2+ on the right side and 2+ on the left side.        Posterior tibial pulses are 2+ on the right side and 2+ on the left side.      Heart sounds: No murmur heard.     No gallop.   Pulmonary:      Effort: No respiratory distress.      Breath sounds: No wheezing or rales.   Abdominal:      General: There is no distension.      Tenderness: There is no abdominal tenderness. There is no guarding.     Musculoskeletal:        Feet:    Feet:      Right foot:      Skin integrity: No ulcer, skin breakdown, erythema, warmth, callus or dry skin.      Left foot:      Skin integrity: No ulcer, skin breakdown, erythema, warmth, callus or dry skin.       Diabetic Foot Exam    Patient's shoes and socks removed.    Right Foot/Ankle   Right Foot Inspection  Skin Exam: skin normal and skin intact. No dry skin, no warmth, no callus, no erythema, no maceration, no abnormal color, no pre-ulcer, no ulcer and no callus.     Toe Exam: ROM and strength within normal limits.     Sensory   Monofilament testing: intact    Vascular  Capillary refills: < 3 seconds  The right DP pulse is 2+. The right PT pulse is 2+.     Left Foot/Ankle  Left Foot Inspection  Skin Exam: skin normal and skin intact. No dry skin, no warmth, no erythema, no maceration, normal color, no pre-ulcer, no ulcer and no callus.     Toe Exam: ROM and strength within normal limits.     Sensory   Monofilament testing: intact    Vascular  Capillary refills: < 3 seconds  The left DP pulse is 2+. The left PT pulse is 2+.     Assign Risk Category  No deformity present  No loss of protective sensation  No weak pulses  Risk: 0

## 2025-06-20 NOTE — TELEPHONE ENCOUNTER
Upon review of the In Basket request we Iris Exams are performed internally. [facility] is unable/cannot update workflows done at an internal/office level.    Any additional questions or concerns should be emailed to the Practice Liaisons via the appropriate education email address, please do not reply via In Basket.    Thank you  Sagar Parkinson MA   [facility]

## 2025-06-20 NOTE — ASSESSMENT & PLAN NOTE
Orders:    Evolocumab 140 MG/ML SOAJ; Inject 1 mL (140 mg total) under the skin every 14 (fourteen) days

## 2025-06-20 NOTE — TELEPHONE ENCOUNTER
----- Message from Ronda PEREZ sent at 6/19/2025  4:32 PM EDT -----  Regarding: DM eye exam  06/19/25 4:33 PM    Hello, our patient Wagner Ochoa has had Diabetic Eye Exam completed/performed. Please assist in updating the patient chart by pulling the document from the Imaging/Procedure Tab. The date of service is 12/17/24.     Thank you,  Ronda Hernandez [facility] PR[facility]

## 2025-06-30 ENCOUNTER — TELEPHONE (OUTPATIENT)
Age: 75
End: 2025-06-30

## 2025-06-30 NOTE — TELEPHONE ENCOUNTER
[facility] called to advise Evolocumab is not covered by patients insurance. They provided an alternate, Praluent. If provider is agreeable please sent script for new medication. Thank you.

## 2025-07-01 ENCOUNTER — TELEPHONE (OUTPATIENT)
Age: 75
End: 2025-07-01

## 2025-07-01 DIAGNOSIS — E78.2 MIXED HYPERLIPIDEMIA: Primary | ICD-10-CM

## 2025-07-01 NOTE — TELEPHONE ENCOUNTER
PA for Praluent 75MG/ML SUBMITTED to [employer]    via    [x]CMM-KEY: [id number]  []Surescripts-Case ID #   []Availity-Auth ID # NDC #   []Faxed to plan   []Other website   []Phone call Case ID #     [x]PA sent as URGENT    All office notes, labs and other pertaining documents and studies sent. Clinical questions answered. Awaiting determination from insurance company.     Turnaround time for your insurance to make a decision on your Prior Authorization can take 7-21 business days.

## 2025-07-01 NOTE — TELEPHONE ENCOUNTER
PA for Praluent 75MG/ML APPROVED     Date(s) approved 07/01/2025-07/01/2026    Patient advised by          []MyChart Message  []Phone call   []LMOM  []L/M to call office as no active Communication consent on file  [x]Unable to leave detailed message as VM not approved on Communication consent       Pharmacy advised by    [x]Fax  []Phone call     Approval letter scanned into Media Yes

## 2025-07-03 ENCOUNTER — RA CDI HCC (OUTPATIENT)
Dept: OTHER | Facility: HOSPITAL | Age: 75
End: 2025-07-03

## 2025-07-03 DIAGNOSIS — G89.29 CHRONIC MIDLINE LOW BACK PAIN WITH RIGHT-SIDED SCIATICA: ICD-10-CM

## 2025-07-03 DIAGNOSIS — M54.41 CHRONIC MIDLINE LOW BACK PAIN WITH RIGHT-SIDED SCIATICA: ICD-10-CM

## 2025-07-03 RX ORDER — TRAMADOL HYDROCHLORIDE 50 MG/1
50 TABLET ORAL EVERY 12 HOURS PRN
Qty: 60 TABLET | Refills: 0 | Status: SHIPPED | OUTPATIENT
Start: 2025-07-03

## 2025-07-14 ENCOUNTER — TELEPHONE (OUTPATIENT)
Dept: FAMILY MEDICINE CLINIC | Facility: CLINIC | Age: 75
End: 2025-07-14

## 2025-07-21 DIAGNOSIS — Z79.4 TYPE 2 DIABETES MELLITUS WITH HYPERGLYCEMIA, WITH LONG-TERM CURRENT USE OF INSULIN (HCC): ICD-10-CM

## 2025-07-21 DIAGNOSIS — E11.65 TYPE 2 DIABETES MELLITUS WITH HYPERGLYCEMIA, WITH LONG-TERM CURRENT USE OF INSULIN (HCC): ICD-10-CM

## 2025-07-22 ENCOUNTER — TELEPHONE (OUTPATIENT)
Age: 75
End: 2025-07-22

## 2025-07-22 NOTE — TELEPHONE ENCOUNTER
Patient called today asking for Jardiance, glucose sensor and test strips. I believe patient is seeing and Endocrinologist in [facility] because I see there was some information submitted as well. Please review and advise, I only seen the test strips ordered on his medication list. [phone] thank you.

## 2025-07-23 NOTE — TELEPHONE ENCOUNTER
Pt stated he did not come because he is going to [place] tomorrow. Advised to ask Dr. Connors for renewal.

## 2025-08-01 ENCOUNTER — TELEPHONE (OUTPATIENT)
Age: 75
End: 2025-08-01

## 2025-08-01 DIAGNOSIS — E11.22 TYPE 2 DIABETES MELLITUS WITH STAGE 3 CHRONIC KIDNEY DISEASE, WITH LONG-TERM CURRENT USE OF INSULIN, UNSPECIFIED WHETHER STAGE 3A OR 3B CKD (HCC): Primary | ICD-10-CM

## 2025-08-01 DIAGNOSIS — N18.30 TYPE 2 DIABETES MELLITUS WITH STAGE 3 CHRONIC KIDNEY DISEASE, WITH LONG-TERM CURRENT USE OF INSULIN, UNSPECIFIED WHETHER STAGE 3A OR 3B CKD (HCC): Primary | ICD-10-CM

## 2025-08-01 DIAGNOSIS — Z79.4 TYPE 2 DIABETES MELLITUS WITH STAGE 3 CHRONIC KIDNEY DISEASE, WITH LONG-TERM CURRENT USE OF INSULIN, UNSPECIFIED WHETHER STAGE 3A OR 3B CKD (HCC): Primary | ICD-10-CM

## 2025-08-01 RX ORDER — ACYCLOVIR 800 MG/1
1 TABLET ORAL
Qty: 6 EACH | Refills: 3 | Status: SHIPPED | OUTPATIENT
Start: 2025-08-01

## 2025-08-01 RX ORDER — BLOOD-GLUCOSE METER
1 KIT MISCELLANEOUS 3 TIMES DAILY
Qty: 300 STRIP | Refills: 0 | Status: SHIPPED | OUTPATIENT
Start: 2025-08-01

## 2025-08-01 RX ORDER — FLASH GLUCOSE SENSOR
1 KIT MISCELLANEOUS
Qty: 4 EACH | Refills: 5 | Status: SHIPPED | OUTPATIENT
Start: 2025-08-01

## (undated) DEVICE — Device

## (undated) DEVICE — GLIDESHEATH BASIC HYDROPHILIC COATED INTRODUCER SHEATH: Brand: GLIDESHEATH

## (undated) DEVICE — RADIFOCUS GLIDEWIRE: Brand: GLIDEWIRE

## (undated) DEVICE — CATH DIAG 5FR IMPULSE 110CM PIG

## (undated) DEVICE — CATH GUIDE LAUNCHER 6FR EBU 3.5

## (undated) DEVICE — BALLOON EUPHORA RX 2 X 15MM

## (undated) DEVICE — GUIDEWIRE WHOLEY HI TORQUE INTERM MOD J .035 145CM

## (undated) DEVICE — HT BALANCE MIDDLEWEIGHT ELITE GUIDE WIRE .014" 3 CM STRAIGHT TIP 190 CM: Brand: HI-TORQUE BALANCE MIDDLEWEIGHT ELITE

## (undated) DEVICE — RADIFOCUS OPTITORQUE ANGIOGRAPHIC CATHETER: Brand: OPTITORQUE

## (undated) DEVICE — TR BAND RADIAL ARTERY COMPRESSION DEVICE: Brand: TR BAND

## (undated) DEVICE — RUNTHROUGH NS EXTRA FLOPPY PTCA GUIDEWIRE: Brand: RUNTHROUGH